# Patient Record
Sex: FEMALE | Race: WHITE | NOT HISPANIC OR LATINO | Employment: FULL TIME
[De-identification: names, ages, dates, MRNs, and addresses within clinical notes are randomized per-mention and may not be internally consistent; named-entity substitution may affect disease eponyms.]

---

## 2017-07-08 ENCOUNTER — HEALTH MAINTENANCE LETTER (OUTPATIENT)
Age: 52
End: 2017-07-08

## 2017-08-17 ENCOUNTER — TELEPHONE (OUTPATIENT)
Dept: INTERNAL MEDICINE | Facility: CLINIC | Age: 52
End: 2017-08-17

## 2017-08-17 NOTE — TELEPHONE ENCOUNTER
Pt calls with question. She has an appt for a physical with Dr. Morse in Sept, but thinks she may need to be seen by a specialist for some GI symptoms she is having. Pt reports loose, sticky stools, indigestion, belching and burning in throat. No blood in stools. Pt is taking TUMs and Pepto for GI symptoms with some relief. Advised appt in our office first for evaluation as Dr. Morse may be able to treat this, pt should be seen sooner than scheduled on 9/12. Pt scheduled appt for 8/28/17 with Dr. Morse and advised to call if symptoms worsen.     Recommended pt try OTC Pepcid or Zantac as some of her symptoms could be from acid reflux. Also advised modifying diet and following BRAT diet for loose stools and avoid spicy, fatty, acidic or fried foods. Pt verbalizes understanding.

## 2017-08-29 ENCOUNTER — OFFICE VISIT (OUTPATIENT)
Dept: INTERNAL MEDICINE | Facility: CLINIC | Age: 52
End: 2017-08-29
Payer: COMMERCIAL

## 2017-08-29 VITALS
HEART RATE: 84 BPM | WEIGHT: 138 LBS | HEIGHT: 67 IN | SYSTOLIC BLOOD PRESSURE: 110 MMHG | OXYGEN SATURATION: 99 % | DIASTOLIC BLOOD PRESSURE: 70 MMHG | TEMPERATURE: 98.6 F | BODY MASS INDEX: 21.66 KG/M2

## 2017-08-29 DIAGNOSIS — Z80.0 FAMILY HISTORY OF ESOPHAGEAL CANCER: ICD-10-CM

## 2017-08-29 DIAGNOSIS — K58.9 IRRITABLE BOWEL SYNDROME, UNSPECIFIED TYPE: ICD-10-CM

## 2017-08-29 DIAGNOSIS — Z00.00 ENCOUNTER FOR ROUTINE ADULT HEALTH EXAMINATION WITHOUT ABNORMAL FINDINGS: Primary | ICD-10-CM

## 2017-08-29 DIAGNOSIS — K21.9 GASTROESOPHAGEAL REFLUX DISEASE WITHOUT ESOPHAGITIS: ICD-10-CM

## 2017-08-29 LAB
BASOPHILS # BLD AUTO: 0.1 10E9/L (ref 0–0.2)
BASOPHILS NFR BLD AUTO: 1.2 %
DIFFERENTIAL METHOD BLD: ABNORMAL
EOSINOPHIL # BLD AUTO: 0.3 10E9/L (ref 0–0.7)
EOSINOPHIL NFR BLD AUTO: 4.8 %
ERYTHROCYTE [DISTWIDTH] IN BLOOD BY AUTOMATED COUNT: 20.1 % (ref 10–15)
HCT VFR BLD AUTO: 29.2 % (ref 35–47)
HGB BLD-MCNC: 9.9 G/DL (ref 11.7–15.7)
LYMPHOCYTES # BLD AUTO: 1.8 10E9/L (ref 0.8–5.3)
LYMPHOCYTES NFR BLD AUTO: 27.6 %
MCH RBC QN AUTO: 30.8 PG (ref 26.5–33)
MCHC RBC AUTO-ENTMCNC: 33.9 G/DL (ref 31.5–36.5)
MCV RBC AUTO: 91 FL (ref 78–100)
MONOCYTES # BLD AUTO: 0.4 10E9/L (ref 0–1.3)
MONOCYTES NFR BLD AUTO: 6.4 %
NEUTROPHILS # BLD AUTO: 4 10E9/L (ref 1.6–8.3)
NEUTROPHILS NFR BLD AUTO: 60 %
PLATELET # BLD AUTO: 224 10E9/L (ref 150–450)
RBC # BLD AUTO: 3.21 10E12/L (ref 3.8–5.2)
WBC # BLD AUTO: 6.6 10E9/L (ref 4–11)

## 2017-08-29 PROCEDURE — 36415 COLL VENOUS BLD VENIPUNCTURE: CPT | Performed by: INTERNAL MEDICINE

## 2017-08-29 PROCEDURE — 99396 PREV VISIT EST AGE 40-64: CPT | Performed by: INTERNAL MEDICINE

## 2017-08-29 PROCEDURE — 82306 VITAMIN D 25 HYDROXY: CPT | Performed by: INTERNAL MEDICINE

## 2017-08-29 PROCEDURE — 80061 LIPID PANEL: CPT | Performed by: INTERNAL MEDICINE

## 2017-08-29 PROCEDURE — 80050 GENERAL HEALTH PANEL: CPT | Performed by: INTERNAL MEDICINE

## 2017-08-29 PROCEDURE — 84439 ASSAY OF FREE THYROXINE: CPT | Performed by: INTERNAL MEDICINE

## 2017-08-29 ASSESSMENT — PATIENT HEALTH QUESTIONNAIRE - PHQ9
5. POOR APPETITE OR OVEREATING: MORE THAN HALF THE DAYS
SUM OF ALL RESPONSES TO PHQ QUESTIONS 1-9: 14

## 2017-08-29 ASSESSMENT — ANXIETY QUESTIONNAIRES
3. WORRYING TOO MUCH ABOUT DIFFERENT THINGS: NEARLY EVERY DAY
1. FEELING NERVOUS, ANXIOUS, OR ON EDGE: MORE THAN HALF THE DAYS
2. NOT BEING ABLE TO STOP OR CONTROL WORRYING: NEARLY EVERY DAY
GAD7 TOTAL SCORE: 13
7. FEELING AFRAID AS IF SOMETHING AWFUL MIGHT HAPPEN: NOT AT ALL
6. BECOMING EASILY ANNOYED OR IRRITABLE: SEVERAL DAYS
5. BEING SO RESTLESS THAT IT IS HARD TO SIT STILL: MORE THAN HALF THE DAYS
IF YOU CHECKED OFF ANY PROBLEMS ON THIS QUESTIONNAIRE, HOW DIFFICULT HAVE THESE PROBLEMS MADE IT FOR YOU TO DO YOUR WORK, TAKE CARE OF THINGS AT HOME, OR GET ALONG WITH OTHER PEOPLE: VERY DIFFICULT

## 2017-08-29 NOTE — NURSING NOTE
"Chief Complaint   Patient presents with     Physical     digestive issues getting worse       Initial /70 (BP Location: Left arm, Cuff Size: Adult Regular)  Pulse 84  Temp 98.6  F (37  C) (Oral)  Ht 5' 7\" (1.702 m)  Wt 138 lb (62.6 kg)  LMP 04/28/2009  SpO2 99%  Breastfeeding? No  BMI 21.61 kg/m2 Estimated body mass index is 21.61 kg/(m^2) as calculated from the following:    Height as of this encounter: 5' 7\" (1.702 m).    Weight as of this encounter: 138 lb (62.6 kg).  Medication Reconciliation: complete    Yolande Posadas CMA    "

## 2017-08-29 NOTE — PROGRESS NOTES
SUBJECTIVE:   CC: Kari Christian is an 51 year old woman who presents for preventive health visit.     Physical   Annual:     Getting at least 3 servings of Calcium per day::  NO    Bi-annual eye exam::  Yes    Dental care twice a year::  Yes    Sleep apnea or symptoms of sleep apnea::  None    Diet::  Regular (no restrictions) (lactose intolerant)    Frequency of exercise:: walks.    Duration of exercise::  15-30 minutes    Taking medications regularly::  Yes    Medication side effects::  None    Additional concerns today:: GI.    She has had numbness/tingling intermittently.  Her IBS is flared up recently. She has had diarrhea.         Today's PHQ-2 Score: PHQ-2 ( 1999 Pfizer) 1/19/2015   Q1: Little interest or pleasure in doing things 0   Q2: Feeling down, depressed or hopeless 0   PHQ-2 Score 0       Abuse: Current or Past(Physical, Sexual or Emotional)- No  Do you feel safe in your environment - Yes    Social History   Substance Use Topics     Smoking status: Current Every Day Smoker     Packs/day: 0.30     Years: 20.00     Types: Cigarettes     Smokeless tobacco: Never Used     Alcohol use 1.2 - 1.8 oz/week     2 - 3 Standard drinks or equivalent per week      Comment: socially     The patient does not drink >3 drinks per day nor >7 drinks per week.    Reviewed orders with patient.  Reviewed health maintenance and updated orders accordingly - Yes  Labs reviewed in [facility]    Patient over age 50, mutual decision to screen reflected in health maintenance.      Pertinent mammograms are reviewed under the imaging tab.  History of abnormal Pap smear: NO - age 30- 65 PAP every 3 years recommended    [facility]         Reviewed and updated as needed this visit by Provider              ROS:  C: NEGATIVE for fever, chills, change in weight  I: NEGATIVE for worrisome rashes, moles or lesions  E: NEGATIVE for vision changes or irritation  ENT: NEGATIVE for  "ear, mouth and throat problems  R: NEGATIVE for significant cough or SOB  B: NEGATIVE for masses, tenderness or discharge  CV: NEGATIVE for chest pain, palpitations or peripheral edema  GI: NEGATIVE for nausea, abdominal pain, heartburn, or change in bowel habits  : NEGATIVE for unusual urinary or vaginal symptoms. No vaginal bleeding.  M: NEGATIVE for significant arthralgias or myalgia  N: NEGATIVE for weakness, dizziness or paresthesias  P: NEGATIVE for changes in mood or affect      OBJECTIVE:   LMP 04/28/2009   /70 (BP Location: Left arm, Cuff Size: Adult Regular)  Pulse 84  Temp 98.6  F (37  C) (Oral)  Ht 5' 7\" (1.702 m)  Wt 138 lb (62.6 kg)  LMP 04/28/2009  SpO2 99%  Breastfeeding? No  BMI 21.61 kg/m2    EXAM:  GENERAL: healthy, alert and no distress  EYES: Eyes grossly normal to inspection, PERRL and conjunctivae and sclerae normal  HENT: ear canals and TM's normal, nose and mouth without ulcers or lesions  NECK: no adenopathy, no asymmetry, masses, or scars and thyroid normal to palpation  RESP: lungs clear to auscultation - no rales, rhonchi or wheezes  BREAST: normal without masses, tenderness or nipple discharge and no palpable axillary masses or adenopathy  CV: regular rate and rhythm, normal S1 S2, no S3 or S4, no murmur, click or rub, no peripheral edema and peripheral pulses strong  ABDOMEN: soft, nontender, no hepatosplenomegaly, no masses and bowel sounds normal  MS: no gross musculoskeletal defects noted, no edema  SKIN: no suspicious lesions or rashes  NEURO: Normal strength and tone, mentation intact and speech normal  PSYCH: mentation appears normal, affect normal/bright    ASSESSMENT/PLAN:     (Z00.00) Encounter for routine adult health examination without abnormal findings  (primary encounter diagnosis)  Comment: fasting  Plan: Vitamin D Deficiency, CBC with platelets         differential, Comprehensive metabolic panel,         TSH with free T4 reflex, Lipid panel reflex to     " "    direct LDL              COUNSELING:  Reviewed preventive health counseling, as reflected in patient instructions         reports that she has been smoking Cigarettes.  She has a 6.00 pack-year smoking history. She has never used smokeless tobacco.    Estimated body mass index is 21.61 kg/(m^2) as calculated from the following:    Height as of 12/23/16: 5' 7\" (1.702 m).    Weight as of 12/23/16: 138 lb (62.6 kg).         Counseling Resources:  ATP IV Guidelines  Pooled Cohorts Equation Calculator  Breast Cancer Risk Calculator  FRAX Risk Assessment  ICSI Preventive Guidelines  Dietary Guidelines for Americans, 2010  USDA's MyPlate  ASA Prophylaxis  Lung CA Screening    Brenna Morse MD  [facility]  "

## 2017-08-30 LAB
ALBUMIN SERPL-MCNC: 4.1 G/DL (ref 3.4–5)
ALP SERPL-CCNC: 60 U/L (ref 40–150)
ALT SERPL W P-5'-P-CCNC: 17 U/L (ref 0–50)
ANION GAP SERPL CALCULATED.3IONS-SCNC: 7 MMOL/L (ref 3–14)
AST SERPL W P-5'-P-CCNC: 17 U/L (ref 0–45)
BILIRUB SERPL-MCNC: 3.4 MG/DL (ref 0.2–1.3)
BUN SERPL-MCNC: 11 MG/DL (ref 7–30)
CALCIUM SERPL-MCNC: 8.3 MG/DL (ref 8.5–10.1)
CHLORIDE SERPL-SCNC: 106 MMOL/L (ref 94–109)
CHOLEST SERPL-MCNC: 130 MG/DL
CO2 SERPL-SCNC: 28 MMOL/L (ref 20–32)
CREAT SERPL-MCNC: 0.66 MG/DL (ref 0.52–1.04)
DEPRECATED CALCIDIOL+CALCIFEROL SERPL-MC: 31 UG/L (ref 20–75)
GFR SERPL CREATININE-BSD FRML MDRD: >90 ML/MIN/1.7M2
GLUCOSE SERPL-MCNC: 70 MG/DL (ref 70–99)
HDLC SERPL-MCNC: 68 MG/DL
LDLC SERPL CALC-MCNC: 48 MG/DL
NONHDLC SERPL-MCNC: 62 MG/DL
POTASSIUM SERPL-SCNC: 3.5 MMOL/L (ref 3.4–5.3)
PROT SERPL-MCNC: 7.3 G/DL (ref 6.8–8.8)
SODIUM SERPL-SCNC: 141 MMOL/L (ref 133–144)
T4 FREE SERPL-MCNC: 1.25 NG/DL (ref 0.76–1.46)
TRIGL SERPL-MCNC: 72 MG/DL
TSH SERPL DL<=0.005 MIU/L-ACNC: 0.33 MU/L (ref 0.4–4)

## 2017-08-30 ASSESSMENT — ASTHMA QUESTIONNAIRES: ACT_TOTALSCORE: 19

## 2017-08-30 ASSESSMENT — ANXIETY QUESTIONNAIRES: GAD7 TOTAL SCORE: 13

## 2017-09-19 ENCOUNTER — TELEPHONE (OUTPATIENT)
Dept: INTERNAL MEDICINE | Facility: CLINIC | Age: 52
End: 2017-09-19

## 2017-09-19 NOTE — TELEPHONE ENCOUNTER
Pt calls for lab results, and she was given results.  She says she wants to have a general cancer blood test, but she would like to have it billed as routine.  She has several symptoms that have prompted her to ask for this, as well as family hx on both sides.  She was advised that the Dr can't bill it as routine if it isn't.     Please advise.

## 2017-09-26 NOTE — TELEPHONE ENCOUNTER
We do not have a general cancer test.    A CBC with blood counts were drawn with the physical.    Is there a specific test she is requesting?

## 2017-12-02 ENCOUNTER — HEALTH MAINTENANCE LETTER (OUTPATIENT)
Age: 52
End: 2017-12-02

## 2018-01-03 RX ORDER — INFLUENZA A VIRUS A/GUANGDONG-MAONAN/SWL1536/2019 CNIC-1909 (H1N1) ANTIGEN (FORMALDEHYDE INACTIVATED), INFLUENZA A VIRUS A/HONG KONG/2671/2019 (H3N2) ANTIGEN (FORMALDEHYDE INACTIVATED), INFLUENZA B VIRUS B/PHUKET/3073/2013 ANTIGEN (FORMALDEHYDE INACTIVATED), AND INFLUENZA B VIRUS B/WASHINGTON/02/2019 ANTIGEN (FORMALDEHYDE INACTIVATED) 15; 15; 15; 15 UG/.5ML; UG/.5ML; UG/.5ML; UG/.5ML
INJECTION, SUSPENSION INTRAMUSCULAR
Refills: 0 | COMMUNITY
Start: 2017-10-27 | End: 2018-06-29

## 2018-01-15 ENCOUNTER — HOSPITAL ENCOUNTER (OUTPATIENT)
Facility: CLINIC | Age: 53
Discharge: HOME | End: 2018-01-15
Attending: INTERNAL MEDICINE | Admitting: INTERNAL MEDICINE
Payer: COMMERCIAL

## 2018-01-15 VITALS
RESPIRATION RATE: 14 BRPM | BODY MASS INDEX: 21.97 KG/M2 | OXYGEN SATURATION: 100 % | HEIGHT: 67 IN | SYSTOLIC BLOOD PRESSURE: 92 MMHG | WEIGHT: 140 LBS | DIASTOLIC BLOOD PRESSURE: 60 MMHG | HEART RATE: 77 BPM

## 2018-01-15 LAB
COLONOSCOPY: NORMAL
UPPER GI ENDOSCOPY: NORMAL

## 2018-01-15 PROCEDURE — 88305 TISSUE EXAM BY PATHOLOGIST: CPT | Mod: 26 | Performed by: INTERNAL MEDICINE

## 2018-01-15 PROCEDURE — 25000125 ZZHC RX 250: Performed by: INTERNAL MEDICINE

## 2018-01-15 PROCEDURE — 25000128 H RX IP 250 OP 636: Performed by: INTERNAL MEDICINE

## 2018-01-15 PROCEDURE — 43239 EGD BIOPSY SINGLE/MULTIPLE: CPT | Performed by: INTERNAL MEDICINE

## 2018-01-15 PROCEDURE — G0500 MOD SEDAT ENDO SERVICE >5YRS: HCPCS | Performed by: INTERNAL MEDICINE

## 2018-01-15 PROCEDURE — 99153 MOD SED SAME PHYS/QHP EA: CPT | Performed by: INTERNAL MEDICINE

## 2018-01-15 PROCEDURE — 88305 TISSUE EXAM BY PATHOLOGIST: CPT | Performed by: INTERNAL MEDICINE

## 2018-01-15 PROCEDURE — 45385 COLONOSCOPY W/LESION REMOVAL: CPT | Performed by: INTERNAL MEDICINE

## 2018-01-15 RX ORDER — ONDANSETRON 4 MG/1
4 TABLET, ORALLY DISINTEGRATING ORAL EVERY 6 HOURS PRN
Status: DISCONTINUED | OUTPATIENT
Start: 2018-01-15 | End: 2018-01-15 | Stop reason: HOSPADM

## 2018-01-15 RX ORDER — ONDANSETRON 2 MG/ML
INJECTION INTRAMUSCULAR; INTRAVENOUS PRN
Status: DISCONTINUED | OUTPATIENT
Start: 2018-01-15 | End: 2018-01-15 | Stop reason: HOSPADM

## 2018-01-15 RX ORDER — FLUMAZENIL 0.1 MG/ML
0.2 INJECTION, SOLUTION INTRAVENOUS
Status: DISCONTINUED | OUTPATIENT
Start: 2018-01-15 | End: 2018-01-15 | Stop reason: HOSPADM

## 2018-01-15 RX ORDER — ONDANSETRON 2 MG/ML
4 INJECTION INTRAMUSCULAR; INTRAVENOUS
Status: DISCONTINUED | OUTPATIENT
Start: 2018-01-15 | End: 2018-01-15 | Stop reason: HOSPADM

## 2018-01-15 RX ORDER — NALOXONE HYDROCHLORIDE 0.4 MG/ML
.1-.4 INJECTION, SOLUTION INTRAMUSCULAR; INTRAVENOUS; SUBCUTANEOUS
Status: DISCONTINUED | OUTPATIENT
Start: 2018-01-15 | End: 2018-01-15 | Stop reason: HOSPADM

## 2018-01-15 RX ORDER — ONDANSETRON 2 MG/ML
4 INJECTION INTRAMUSCULAR; INTRAVENOUS EVERY 6 HOURS PRN
Status: DISCONTINUED | OUTPATIENT
Start: 2018-01-15 | End: 2018-01-15 | Stop reason: HOSPADM

## 2018-01-15 RX ORDER — FENTANYL CITRATE 50 UG/ML
INJECTION, SOLUTION INTRAMUSCULAR; INTRAVENOUS PRN
Status: DISCONTINUED | OUTPATIENT
Start: 2018-01-15 | End: 2018-01-15 | Stop reason: HOSPADM

## 2018-01-15 RX ORDER — LIDOCAINE 40 MG/G
CREAM TOPICAL
Status: DISCONTINUED | OUTPATIENT
Start: 2018-01-15 | End: 2018-01-15 | Stop reason: HOSPADM

## 2018-01-15 NOTE — DISCHARGE INSTRUCTIONS
Understanding Colon and Rectal Polyps     The colon has a smooth lining composed of millions of cells.     The colon (also called the large intestine) is a muscular tube that forms the last part of the digestive tract. It absorbs water and stores food waste. The colon is about 4 to 6 feet long. The rectum is the last 6 inches of the colon. The colon and rectum have a smooth lining composed of millions of cells. Changes in these cells can lead to growths in the colon that can become cancerous and should be removed.     When the Colon Lining Changes  Changes that occur in the cells that line the colon or rectum can lead to growths called polyps. Over a period of years, polyps can turn cancerous. Removing polyps early may prevent cancer from ever forming.      Polyps  Polyps are fleshy clumps of tissue that form on the lining of the colon or rectum. Small polyps are usually benign (not cancerous). However, over time, cells in a polyp can change and become cancerous. The larger a polyp grows, the more likely this is to happen. Also, certain types of polyps known as adenomatous polyps are considered premalignant. This means that they will almost always become cancerous if they re not removed.          Cancer  Almost all colorectal cancers start when polyp cells begin growing abnormally. As a cancerous tumor grows, it may involve more and more of the colon or rectum. In time, cancer can also grow beyond the colon or rectum and spread to nearby organs or to glands called lymph nodes. The cells can also travel to other parts of the body. This is known as metastasis. The earlier a cancerous tumor is removed, the better the chance of preventing its spread.        [id number] [facility], [address] [place] [address], [place] [address]. All rights reserved. This information is not intended as a substitute for professional medical care. Always follow your healthcare professional's instructions.

## 2018-01-15 NOTE — LETTER
January 3, 2018  Kari Christian  [address] [place] [address]        Thank you for choosing [facility]. You are scheduled for the following services.     Date:  Monday, January 15       Procedure: UPPER ENDOSCOPY & COLONOSCOPY  Doctor:  Dr. Chad Wade          Arrival Time:   12:30pm *check in at Emergency/Endoscopy desk*  Procedure Time:  1pm  Location:   [facility]        Endoscopy Department, First Floor (Enter through ER Doors) *         [address] [place], [place] [address]      [phone] or [phone] ([facility]) to reschedule      MIRALAX -GATORADE  PREP    Upper Endoscopy or Esophagogastroduodenoscopy (EGD) is a test performed to evaluate symptoms of persistent abdominal pain, nausea, vomiting or difficulty swallowing. It may also be used to treat various conditions of the upper gastrointestinal (GI) tract, such as bleeding, narrowing or abnormal growths. During the procedure, a doctor examines the lining of your esophagus, stomach and the first part of your small intestine through a thin, flexible tube called an endoscope. If growths or other abnormalities are found during the procedure, the doctor may remove the abnormal tissue (biopsy) for further examination.     Colonoscopy is the most accurate test to detect colon polyps and colon cancer; and the only test where polyps can be removed. During this procedure, a doctor examines the lining of your large intestine and rectum through a flexible tube.     Transportation  Arrange for a ride for the day of your procedures with a responsible adult.  A taxi ride is not an option unless you are accompanied by a responsible adult. If you fail to arrange transportation with a responsible adult, your procedure will be cancelled and rescheduled.    Purchase the  following supplies at your local pharmacy:  - 2 (two) bisacodyl tablets: each tablet contains 5 mg.  (Dulcolax  laxative NOT  Dulcolax  stool softener)   - 1 (one) 8.3 oz bottle of Polyethylene Glycol (PEG) 3350 Powder   (MiraLAX , Smooth LAX , ClearLAX  or equivalent)  - 64 oz Gatorade    Regular Gatorade, Gatorade G2 , Powerade , Powerade Zero  or Pedialyte  is acceptable. Red colored flavors are not allowed; all other colors (yellow, green, orange, purple and blue) are okay. It is also okay to buy two 2.12 oz packets of powdered Gatorade that can be mixed with water to a total volume of 64 oz of liquid.  - 1 (one) 10 oz bottle of Magnesium Citrate (Red colored flavors are not allowed)  It is also okay for you to use a 0.5 oz package of powdered magnesium citrate (17 g) mixed with 10 oz of water.      PREPARATION FOR COLONOSCOPY  7 days before:    Discontinue fiber supplements and medications containing iron. This includes Metamucil  and Fibercon ; and multivitamins with iron.  3 days before:    Begin a low-fiber diet. A low-fiber diet helps making the cleanout more effective.     Examples of a low-fiber diet include (but are not limited to): white bread, white rice, pasta, crackers, fish, chicken, eggs, ground beef, creamy peanut butter, cooked/steamed/boiled vegetables, canned fruit, bananas, melons, milk, plain yogurt cheese, salad dressing and other condiments.     The following are not allowed on a low-fiber diet: seeds, nuts, popcorn, bran, whole wheat, corn, quinoa, raw fruits and vegetables, berries and dried fruit, beans and lentils.    For additional details on low-fiber diet, please refer to the table on the last page.  2 days before:    Continue the low-fiber diet.     Drink at least 8 glasses of water throughout the day.     Stop eating solid foods at 11:45 pm.  1 day before:    In the morning: begin a clear liquid diet (liquids you can see through).     Examples of a clear liquid diet include: water, clear broth or bouillon, Gatorade, Pedialyte or Powerade, carbonated and non-carbonated soft drinks (Sprite , 7-Up , steve  vinicio), strained fruit juices without pulp (apple, white grape, white cranberry), Jell-O  and popsicles.     The following are not allowed on a clear liquid diet: red liquids, alcoholic beverages, coffee, dairy products (milk, creamer, and yogurt), protein shakes, creamy broths, juice with pulp and chewing tobacco.    At noon: take 2 (two) bisacodyl tablets     At 4 (and no later than 6pm): start drinking the Miralax-Gatorade preparation (8.3 oz of Miralax mixed with 64 oz of Gatorade in a large pitcher). Drink 1(one) 8 oz glass every 15 minutes thereafter, until the mixture is gone.      COLON CLEANSING TIPS: drink adequate amounts of fluids before and after your colon cleansing to prevent dehydration. Stay near a toilet because you will have diarrhea. Even if you are sitting on the toilet, continue to drink the cleansing solution every 15 minutes. If you feel nauseous or vomit, rinse your mouth with water, take a 15 to 30-minute-break and then continue drinking the solution. You will be uncomfortable until the stool has flushed from your colon (in about 2 to 4 hours). You may feel chilled.    Day of your procedure  You may take all of your morning medications including blood pressure medications, blood thinners (if you have not been instructed to stop these by our office), methadone, anti-seizure medications with sips of water 3 hours prior to your procedure or earlier. Do not take insulin or vitamins prior to your procedure. Continue the clear liquid diet.   4 hours prior: drink 10 oz of magnesium citrate. It may be easier to drink it with a straw.    STOP consuming all liquids after that.     Do not take anything by mouth during this time.     Allow extra time to travel to your procedure as you may need to stop and use a restroom along the way.  You are ready for the procedure, if you followed all instructions and your stool is no longer formed, but clear or yellow liquid. If you are unsure whether your colon is  clean, please call our office at [phone] before you leave for your appointment.  Bring the following to your procedure:  - Insurance Card/Photo ID.   - List of current medications including over-the-counter medications and supplements.   - Your rescue inhaler if you currently use one to control asthma.    Canceling or rescheduling your appointment:   If you must cancel or reschedule your appointment, please call [phone] as soon as possible.    COLONOSCOPY PRE-PROCEDURE CHECKLIST  If you have diabetes, ask your regular doctor for diet and medication restrictions.  If you take an anticoagulant or anti-platelet medication (such as Coumadin , Lovenox , Pradaxa , Xarelto , Eliquis , etc.), please call your primary doctor for advice on holding this medication.  If you take aspirin you may continue to do so.  If you are or may be pregnant, please discuss the risks and benefits of this procedure with your doctor.    What happens during a colonoscopy?  Plan to spend up to two hours, starting at registration time, at the endoscopy center the day of your procedure. The colonoscopy takes an average of 15 to 30 minutes. Recovery time is about 30 minutes.     Before the exam:    You will change into a gown.    Your medical history and medication list will be reviewed with you, unless that has been done over the phone prior to the procedure.     A nurse will insert an intravenous (IV) line into your hand or arm.    The doctor will meet with you and will give you a consent form to sign.    During the exam:     Medicine will be given through the IV line to help you relax.     Your heart rate and oxygen levels will be monitored. If your blood pressure is low, you may be given fluids through the IV line.     The doctor will insert a flexible hollow tube, called a colonoscope, into your rectum. The scope will be advanced slowly through the large intestine (colon).    You may have a feeling of fullness or pressure.     If an  abnormal tissue or a polyp is found, the doctor may remove it through the endoscope for closer examination, or biopsy. Tissue removal is painless    After the exam:           Any tissue samples removed during the exam will be sent to a lab for evaluation. It may take 5-7 working days for you to be notified of the results.     A nurse will provide you with complete discharge instructions before you leave the endoscopy center. Be sure to ask the nurse for specific instructions if you take blood thinners such as Aspirin, Coumadin or Plavix.     The doctor will prepare a full report for you and for the physician who referred you for the procedure.     Your doctor will talk with you about the initial results of your exam.      Medication given during the exam will prohibit you from driving for the rest of the day.     Following the exam, you may resume your normal diet. Your first meal should be light, no greasy foods. Avoid alcohol until the next day.     You may resume your regular activities the day after the procedure.       LOW-FIBER DIET  Foods RECOMMENDED Foods to AVOID   Breads, Cereal, Rice and Pasta:   White bread, rolls, biscuits, croissant and leanne toast.   Waffles, Burkinan toast and pancakes.   White rice, noodles, pasta, macaroni and peeled cooked potatoes.   Plain crackers and saltines.   Cooked cereals: farina, cream of rice.   Cold cereals: Puffed Rice , Rice Krispies , Corn Flakes  and Special K    Breads, Cereal, Rice and Pasta:   Breads or rolls with nuts, seeds or fruit.   Whole wheat, pumpernickel, rye breads and cornbread.   Potatoes with skin, brown or wild rice, and kasha (buckwheat).     Vegetables:   Tender cooked and canned vegetables without seeds: carrots, asparagus tips, green or wax beans, pumpkin, spinach, lima beans. Vegetables:   Raw or steamed vegetables (w/ or without seeds)   Johny.   Winter squash, peas, broccoli, Brussel sprouts, cabbage, onions, cauliflower, baked beans, peas  and corn.   Fruits:   Strained fruit juice.   Canned fruit, except pineapple.   Ripe bananas and melon. Fruits:   Prunes and prune juice.   Raw fruits.   Dried fruits: figs, dates and raisins.   Milk/Dairy:   Milk: plain or flavored; yogurt; ice cream.   Custard; cheese and cottage cheese Milk/Dairy:     Meat and other proteins:   Ground, well-cooked tender beef, lamb, ham, veal, pork, fish, poultry, organ meats and eggs.   Peanut butter without nuts. Meat and other proteins:   Tough, fibrous meats with gristle.   Dry beans and peas; lentils and Tofu   Peanut butter with nuts.   Fats, Snack, Sweets, Condiments and Beverages:   Margarine, butter, oils, mayonnaise, sour cream and salad dressing, plain gravy.   Sugar, hard candy, clear jelly, honey and syrup.   Spices, cooked herbs, bouillon, broth and soups made with allowed vegetable, ketchup and mustard.   Coffee, tea and carbonated drinks.   Plain cakes, cookies and pretzels.   Gelatin, plain puddings, custard, ice cream, sherbet and popsicles. Fats, Snack, Sweets, Condiments and Beverages:   Nuts, seeds and coconut.   Jam, marmalade and preserves.   Pickles, olives, relish and horseradish.   All desserts containing nuts, seeds, dried fruit and coconut; or made from whole grains or bran.   Candy made with nuts or seeds.   Popcorn.             DIRECTIONS TO THE ENDOSCOPY DEPARTMENT  From the north ([facility])  Take [address] south, exit on [address] [address]. Get into the left hand luca, turn left (east), go one-half mile to [address] and turn left. Go north to the first stoplight, take a right on [address] and follow it to the Emergency entrance.    From the south ([facility])  Take 35N to the [address]E split and exit on [address] [address]. On [address] [address], turn left (west) to [address]. Turn right (north) on [address]. Go north to the first stoplight, take a right on [address] and follow it to the Emergency  entrance.    From the east via 35E ([facility])  Take [address] south to [address]  exit. Turn right on [address]. Go west to [address]. Turn right (north) on [address]. Go to the first stoplight, take a right and follow on [address] to the Emergency entrance.    From the east via [address] ([facility])  Take [address] to [address]. Turn left (south) on [address] to [address]. Turn left (east) on [address] and follow it to the Emergency entrance.    From the west via [address] ([place])  Take [address] east to [address]. Turn right (south) on [address] to [address]. Turn left (east) on [address] and follow it to the Emergency entrance.

## 2018-01-15 NOTE — IP AVS SNAPSHOT
MRN:[id number]                      After Visit Summary   1/15/2018    Kari Christian    MRN: [id number]           Thank you!     Thank you for choosing [facility] for your care. Our goal is always to provide you with excellent care. Hearing back from our patients is one way we can continue to improve our services. Please take a few minutes to complete the written survey that you may receive in the mail after you visit. If you would like to speak to someone directly about your visit please contact Patient Relations at [phone]. Thank you!          Patient Information     Date Of Birth          1965        About your hospital stay     You were admitted on:  January 15, 2018 You last received care in the:  [facility]    You were discharged on:  January 15, 2018       Who to Call     For medical emergencies, please call 911.  For non-urgent questions about your medical care, please call your primary care provider or clinic, [phone]  For questions related to your surgery, please call your surgery clinic        Attending Provider     Provider Specialty    Chad Wade MD Gastroenterology       Primary Care Provider Office Phone # Fax #    Brenna Gulshan Morse [phone]      Further instructions from your care team         Understanding Colon and Rectal Polyps     The colon has a smooth lining composed of millions of cells.     The colon (also called the large intestine) is a muscular tube that forms the last part of the digestive tract. It absorbs water and stores food waste. The colon is about 4 to 6 feet long. The rectum is the last 6 inches of the colon. The colon and rectum have a smooth lining composed of millions of cells. Changes in these cells can lead to growths in the colon that can become cancerous and should be removed.     When the Colon Lining Changes  Changes that occur in the cells that line the colon or rectum can lead to  "growths called polyps. Over a period of years, polyps can turn cancerous. Removing polyps early may prevent cancer from ever forming.      Polyps  Polyps are fleshy clumps of tissue that form on the lining of the colon or rectum. Small polyps are usually benign (not cancerous). However, over time, cells in a polyp can change and become cancerous. The larger a polyp grows, the more likely this is to happen. Also, certain types of polyps known as adenomatous polyps are considered premalignant. This means that they will almost always become cancerous if they re not removed.          Cancer  Almost all colorectal cancers start when polyp cells begin growing abnormally. As a cancerous tumor grows, it may involve more and more of the colon or rectum. In time, cancer can also grow beyond the colon or rectum and spread to nearby organs or to glands called lymph nodes. The cells can also travel to other parts of the body. This is known as metastasis. The earlier a cancerous tumor is removed, the better the chance of preventing its spread.        4957-3536 [address], [place] [address]. All rights reserved. This information is not intended as a substitute for professional medical care. Always follow your healthcare professional's instructions.    Pending Results     No orders found from 1/13/2018 to 1/16/2018.            Admission Information     Date & Time Provider Department Dept. Phone    1/15/2018 Chad Wade MD [facility] [phone]      Your Vitals Were     Blood Pressure Pulse Respirations Height Weight Last Period    88/60 77 12 1.702 m (5' 7\") 63.5 kg (140 lb) 04/28/2009    Pulse Oximetry BMI (Body Mass Index)                97% 21.93 kg/m2          [employer] Information     [employer] gives you secure access to your electronic health record. If you see a primary care provider, you can also send messages to your care team and make appointments. If you have questions, " please call your primary care clinic.  If you do not have a primary care provider, please call [phone] and they will assist you.        Care EveryWhere ID     This is your Care EveryWhere ID. This could be used by other organizations to access your [facility] medical records  [id number]        Equal Access to Services     JACOBYANTONINO OSEI : Hadii aad ku hadnuryso Sokarloali, waaxda luqadaha, qaybta kaalmada gennaro, jennifer maindaxjulius ramos. So [facility] [phone].    ATENCIÓN: Si habla español, tiene a barajas disposición servicios gratuitos de asistencia lingüística. Llame al [phone].    We comply with applicable federal civil rights laws and [place] laws. We do not discriminate on the basis of race, color, national origin, age, disability, sex, sexual orientation, or gender identity.               Review of your medicines      CONTINUE these medicines which have NOT CHANGED        Dose / Directions    ADVIL PO        Refills:  0       albuterol 108 (90 BASE) MCG/ACT Inhaler   Commonly known as:  PROAIR HFA/PROVENTIL HFA/VENTOLIN HFA   Used for:  SOB (shortness of breath)        Dose:  2 puff   Inhale 2 puffs into the lungs every 6 hours as needed for shortness of breath / dyspnea   Quantity:  3 Inhaler   Refills:  1       FLUZONE QUADRIVALENT 0.5 ML injection   Generic drug:  influenza quadrivalent (PF) vacc age 3 yrs and older        INFLUENZA VACCINE 1420-3005, ADMINISTERED IM ARM______LOT______ROUTE IM   Refills:  0       FOLIC ACID PO        Dose:  1 mg   Take 1 mg by mouth daily   Refills:  0       triamcinolone 0.1 % cream   Commonly known as:  KENALOG   Used for:  Rash        Apply sparingly to affected area 2-3 times daily for up to 14 days at a time.   Quantity:  30 g   Refills:  0                Protect others around you: Learn how to safely use, store and throw away your medicines at www.disposemymeds.org.             Medication List: This is a list of all your medications and when to  take them. Check marks below indicate your daily home schedule. Keep this list as a reference.      Medications           Morning Afternoon Evening Bedtime As Needed    ADVIL PO                                albuterol 108 (90 BASE) MCG/ACT Inhaler   Commonly known as:  PROAIR HFA/PROVENTIL HFA/VENTOLIN HFA   Inhale 2 puffs into the lungs every 6 hours as needed for shortness of breath / dyspnea                                FLUZONE QUADRIVALENT 0.5 ML injection   INFLUENZA VACCINE [id number], ADMINISTERED IM ARM______LOT______ROUTE IM   Generic drug:  influenza quadrivalent (PF) vacc age 3 yrs and older                                FOLIC ACID PO   Take 1 mg by mouth daily                                triamcinolone 0.1 % cream   Commonly known as:  KENALOG   Apply sparingly to affected area 2-3 times daily for up to 14 days at a time.

## 2018-01-15 NOTE — H&P
Pre-Endoscopy History and Physical     Kari Christian MRN# [id number]   YOB: 1965 Age: 52 year old     Date of Procedure: 1/15/2018  Primary care provider: Brenna Morse  Type of Endoscopy: Colonoscopy with possible biopsy, possible polypectomy and Gastroscopy with possible biopsy, possible dilation  Reason for Procedure: screen  Type of Anesthesia Anticipated: Conscious Sedation    HPI:    Kari is a 52 year old female who will be undergoing the above procedure.      A history and physical has been performed. The patient's medications and allergies have been reviewed. The risks and benefits of the procedure and the sedation options and risks were discussed with the patient.  All questions were answered and informed consent was obtained.      She denies a personal or family history of anesthesia complications or bleeding disorders.     Patient Active Problem List   Diagnosis     Hereditary spherocytosis (H)     Major depressive disorder, single episode, mild (H)     CARDIOVASCULAR SCREENING; LDL GOAL LESS THAN 160     Anxiety     Vitamin D deficiency     Hyperthyroidism     Tobacco abuse     Intermittent asthma        Past Medical History:   Diagnosis Date     Anemia, unspecified     normal is 7-10     Hereditary spherocytosis (H)     no past transfusion;      Major depressive disorder, single episode, mild (H)      Thyroid disease         Past Surgical History:   Procedure Laterality Date     CHOLECYSTECTOMY  2007       Social History   Substance Use Topics     Smoking status: Current Every Day Smoker     Packs/day: 0.30     Years: 20.00     Types: Cigarettes     Smokeless tobacco: Never Used     Alcohol use 1.2 - 1.8 oz/week     2 - 3 Standard drinks or equivalent per week      Comment: socially       Family History   Problem Relation Age of Onset     Hypertension Mother      Arthritis Mother      Respiratory Mother      Emphysema     GASTROINTESTINAL DISEASE Mother      Circulatory Mother       "C.A.D. Father 59     Hypertension Father      CANCER Father      Esophageal     GASTROINTESTINAL DISEASE Father      Gallstones, Gallbladder removal, Colostomy, diverticulitis     CANCER Maternal Grandfather      Kidney     CANCER Paternal Grandfather      Lung, smoker     Breast Cancer Sister      Thyroid Disease Sister      Cancer - colorectal No family hx of        Prior to Admission medications    Medication Sig Start Date End Date Taking? Authorizing Provider   FLUZONE QUADRIVALENT 0.5 ML injection INFLUENZA VACCINE [id number], ADMINISTERED IM ARM______LOT______ROUTE IM 10/27/17   Reported, Patient   triamcinolone (KENALOG) 0.1 % cream Apply sparingly to affected area 2-3 times daily for up to 14 days at a time.  Patient not taking: Reported on 8/29/2017 12/14/16   Brenna Morse MD   albuterol (PROAIR HFA, PROVENTIL HFA, VENTOLIN HFA) 108 (90 BASE) MCG/ACT inhaler Inhale 2 puffs into the lungs every 6 hours as needed for shortness of breath / dyspnea 7/5/16   Brenna Morse MD   Ibuprofen (ADVIL PO)     Reported, Patient   FOLIC ACID PO Take 1 mg by mouth daily    Reported, Patient       Allergies   Allergen Reactions     Cephalexin Itching     Sulfa Drugs         REVIEW OF SYSTEMS:   5 point ROS negative except as noted above in HPI, including Gen., Resp., CV, GI &  system review.    PHYSICAL EXAM:   LMP 04/28/2009 Estimated body mass index is 21.61 kg/(m^2) as calculated from the following:    Height as of 8/29/17: 1.702 m (5' 7\").    Weight as of 8/29/17: 62.6 kg (138 lb).   GENERAL APPEARANCE: alert, and oriented  MENTAL STATUS: alert  AIRWAY EXAM: Mallampatti Class I (visualization of the soft palate, fauces, uvula, anterior and posterior pillars)  RESP: lungs clear to auscultation - no rales, rhonchi or wheezes  CV: regular rates and rhythm  DIAGNOSTICS:    Not indicated    IMPRESSION   ASA Class 1 - Healthy patient, no medical problems    PLAN:   Plan for Colonoscopy with possible biopsy, " possible polypectomy and Gastroscopy with possible biopsy, possible dilation. We discussed the risks, benefits and alternatives and the patient wished to proceed.    The above has been forwarded to the consulting provider.      Signed Electronically by: Chad Wade  January 15, 2018

## 2018-01-16 LAB — COPATH REPORT: NORMAL

## 2018-05-18 ENCOUNTER — TELEPHONE (OUTPATIENT)
Dept: INTERNAL MEDICINE | Facility: CLINIC | Age: 53
End: 2018-05-18

## 2018-05-18 NOTE — TELEPHONE ENCOUNTER
Pt called. Stated she is filling out a health care assessment form for her employer, and just requested BP from 8/2017 px. Gave pt in

## 2018-06-28 NOTE — PROGRESS NOTES
"  SUBJECTIVE:   Kari Christian is a 52 year old female who presents to clinic today for the following health issues:    Chest Pressure      Duration: 5-6 days    Description (location/character/radiation): Feeling fatigued constantly    Intensity:  moderate    Accompanying signs and symptoms: Chest pressure and SOB on day 1 and 2 (feeling better now)    History (similar episodes/previous evaluation): Spherocytosis (gets anemic)    Precipitating or alleviating factors: None    Therapies tried and outcome: None     Wants to check iron levels with lab work. Patient had an EKG in 2016 and a stress test in 2013.    Chest pressure and SOB.  Not related to exertion, not relieved by rest.  Was constant and went away on it's own with time.  Symptoms start Monday night and went into Tuesday morning - all of Tuesday felt \"crappy.\"  Associated \"fogginess\" (which is chronic, but worse with symptoms).  No palpitations, no dizziness or syncope.    Other issues:  -- Does report pre-syncope symptoms and low bp    BP Readings from Last 3 Encounters:   06/29/18 94/60   01/15/18 92/60   08/29/17 110/70         Problem list and histories reviewed & adjusted, as indicated.  Additional history: as documented    Patient Active Problem List   Diagnosis     Hereditary spherocytosis (H)     Major depressive disorder, single episode, mild (H)     CARDIOVASCULAR SCREENING; LDL GOAL LESS THAN 160     Anxiety     Vitamin D deficiency     Hyperthyroidism     Tobacco abuse     Intermittent asthma     Past Surgical History:   Procedure Laterality Date     CHOLECYSTECTOMY  2007     COLONOSCOPY  09/2014     COLONOSCOPY  01/15/2018    Dr. Wade [facility]     ESOPHAGOSCOPY, GASTROSCOPY, DUODENOSCOPY (EGD), COMBINED N/A 1/15/2018    Procedure: COMBINED ESOPHAGOSCOPY, GASTROSCOPY, DUODENOSCOPY (EGD), BIOPSY SINGLE OR MULTIPLE;  ESOPHAGOSCOPY, GASTROSCOPY, DUODENOSCOPY (EGD) with cold biopsies of duodenum and  COLONOSCOPY with polypectomy;  Surgeon: Sheri, " "Chad PARTIDA MD;  Location:  GI     HEAD & NECK SURGERY      wisdom teeth removed       Social History   Substance Use Topics     Smoking status: Current Every Day Smoker     Packs/day: 0.30     Years: 20.00     Types: Cigarettes     Smokeless tobacco: Never Used     Alcohol use 1.2 - 1.8 oz/week     2 - 3 Standard drinks or equivalent per week      Comment: socially     Family History   Problem Relation Age of Onset     Hypertension Mother      Arthritis Mother      Respiratory Mother      Emphysema     GASTROINTESTINAL DISEASE Mother      Circulatory Mother      C.A.D. Father 59     Hypertension Father      Cancer Father      Esophageal     GASTROINTESTINAL DISEASE Father      Gallstones, Gallbladder removal, Colostomy, diverticulitis     Cancer Maternal Grandfather      Kidney     Cancer Paternal Grandfather      Lung, smoker     Breast Cancer Sister      Thyroid Disease Sister      Cancer - colorectal No family hx of      Colon Cancer No family hx of          Current Outpatient Prescriptions   Medication Sig Dispense Refill     albuterol (PROAIR HFA, PROVENTIL HFA, VENTOLIN HFA) 108 (90 BASE) MCG/ACT inhaler Inhale 2 puffs into the lungs every 6 hours as needed for shortness of breath / dyspnea 3 Inhaler 1     FOLIC ACID PO Take 1 mg by mouth daily       Ibuprofen (ADVIL PO)        triamcinolone (KENALOG) 0.1 % cream Apply sparingly to affected area 2-3 times daily for up to 14 days at a time. (Patient not taking: Reported on 8/29/2017) 30 g 0     Allergies   Allergen Reactions     Cephalexin Itching     Sulfa Drugs        Reviewed and updated as needed this visit by clinical staff       Reviewed and updated as needed this visit by Provider         ROS:  All other systems on a 10-point review are negative, unless otherwise noted in HPI      OBJECTIVE:     BP 94/60 (BP Location: Right arm, Patient Position: Chair, Cuff Size: Adult Regular)  Pulse 87  Temp 98.8  F (37.1  C) (Oral)  Ht 5' 7\" (1.702 m)  Wt 143 lb " 14.4 oz (65.3 kg)  LMP 04/28/2009  SpO2 99%  BMI 22.54 kg/m2  Body mass index is 22.54 kg/(m^2).  GENERAL: healthy, alert and no distress  EYES: Eyes grossly normal to inspection, PERRL and conjunctivae and sclerae normal  HENT: ear canals and TM's normal, nose and mouth without ulcers or lesions  NECK: no adenopathy, no asymmetry, masses, or scars and thyroid normal to palpation  RESP: lungs clear to auscultation - no rales, rhonchi or wheezes  CV: regular rate and rhythm, normal S1 S2, no S3 or S4, no murmur, click or rub, no peripheral edema and peripheral pulses strong  ABDOMEN: soft, nontender, no hepatosplenomegaly, no masses and bowel sounds normal  MS: no gross musculoskeletal defects noted, no edema  SKIN: no suspicious lesions or rashes  NEURO: Normal strength and tone, mentation intact and speech normal  PSYCH: mentation appears normal, affect normal/bright    Diagnostic Test Results:  Normal EKG in 2016, neg stress ECHO 2013    ASSESSMENT/PLAN:       1. Atypical chest pain  Reassuring features - likely non-cardiac.  Non-exertional, not relieved by rest, resolved on own.  Risk factors: tobacco use only - otherwise, no HTN, HLD, DM or other CV risk factors.  Had neg stress test in 2013.  Provided reassurance that this is likely non anginal.  Other etiologies include decompensated anemia, GI reflux, anxiety.  - CBC with platelets    2. Other specified hypotension  Chart review reveals pts BP runs low, however she does report feeling light headed at times, however no syncope.  Recommend she drink fluids and f/u with PCP if gets worse.    3. Tobacco abuse  Recommended smoking cessation to optimize physical health and decrease long term risk of heart disease, as pt expressed deep concern about CV disease.    4. Anxiety  Pt with high scores for anxiety and depression.  Recommended to f/u with PCP to discuss this further.  Discussed with pt that her symptoms could be related to mood disorders too.    PHQ-9  SCORE 1/19/2015 12/23/2016 8/29/2017   Total Score 7 - -   Total Score - 8 14     AUDREY-7 SCORE 2/20/2012 12/23/2016 8/29/2017   Total Score 5 - -   Total Score - 12 13       5. Hereditary spherocytosis (H)  In setting of underlying hematological disease, will r/o anemia as cause of presenting symptoms.  - CBC with platelets  - will call pt with results    Patient Instructions   Chest pain:  -- Your signs, symptoms, and physical exam are very reassuring.  I do not think this is related to heart disease.  -- Will recheck hemoglobin level today to make sure your anemia is not worse  -- I recommend that you follow-up with your PCP within the next month.    Other potential causes:  -- Reflux  -- Anemia  -- Anxiety  -- Lung (but less likely in your case)    I recommend discussing anxiety and depression symptoms with your PCP.    F/u in 1 month with PCP    Deepthi Giordano MD  [facility]

## 2018-06-29 ENCOUNTER — OFFICE VISIT (OUTPATIENT)
Dept: PEDIATRICS | Facility: CLINIC | Age: 53
End: 2018-06-29
Payer: COMMERCIAL

## 2018-06-29 VITALS
WEIGHT: 143.9 LBS | HEIGHT: 67 IN | DIASTOLIC BLOOD PRESSURE: 60 MMHG | TEMPERATURE: 98.8 F | SYSTOLIC BLOOD PRESSURE: 94 MMHG | BODY MASS INDEX: 22.58 KG/M2 | HEART RATE: 87 BPM | OXYGEN SATURATION: 99 %

## 2018-06-29 DIAGNOSIS — F41.9 ANXIETY: ICD-10-CM

## 2018-06-29 DIAGNOSIS — R07.89 ATYPICAL CHEST PAIN: Primary | ICD-10-CM

## 2018-06-29 DIAGNOSIS — I95.89 OTHER SPECIFIED HYPOTENSION: ICD-10-CM

## 2018-06-29 DIAGNOSIS — Z72.0 TOBACCO ABUSE: ICD-10-CM

## 2018-06-29 DIAGNOSIS — D58.0 HEREDITARY SPHEROCYTOSIS (H): ICD-10-CM

## 2018-06-29 LAB
ERYTHROCYTE [DISTWIDTH] IN BLOOD BY AUTOMATED COUNT: 20.4 % (ref 10–15)
HCT VFR BLD AUTO: 28.5 % (ref 35–47)
HGB BLD-MCNC: 9.7 G/DL (ref 11.7–15.7)
MCH RBC QN AUTO: 31.2 PG (ref 26.5–33)
MCHC RBC AUTO-ENTMCNC: 34 G/DL (ref 31.5–36.5)
MCV RBC AUTO: 92 FL (ref 78–100)
PLATELET # BLD AUTO: 238 10E9/L (ref 150–450)
RBC # BLD AUTO: 3.11 10E12/L (ref 3.8–5.2)
WBC # BLD AUTO: 6.7 10E9/L (ref 4–11)

## 2018-06-29 PROCEDURE — 99214 OFFICE O/P EST MOD 30 MIN: CPT | Performed by: INTERNAL MEDICINE

## 2018-06-29 PROCEDURE — 85027 COMPLETE CBC AUTOMATED: CPT | Performed by: INTERNAL MEDICINE

## 2018-06-29 PROCEDURE — 36415 COLL VENOUS BLD VENIPUNCTURE: CPT | Performed by: INTERNAL MEDICINE

## 2018-06-29 ASSESSMENT — ANXIETY QUESTIONNAIRES
2. NOT BEING ABLE TO STOP OR CONTROL WORRYING: SEVERAL DAYS
GAD7 TOTAL SCORE: 6
6. BECOMING EASILY ANNOYED OR IRRITABLE: SEVERAL DAYS
3. WORRYING TOO MUCH ABOUT DIFFERENT THINGS: SEVERAL DAYS
IF YOU CHECKED OFF ANY PROBLEMS ON THIS QUESTIONNAIRE, HOW DIFFICULT HAVE THESE PROBLEMS MADE IT FOR YOU TO DO YOUR WORK, TAKE CARE OF THINGS AT HOME, OR GET ALONG WITH OTHER PEOPLE: SOMEWHAT DIFFICULT
1. FEELING NERVOUS, ANXIOUS, OR ON EDGE: MORE THAN HALF THE DAYS
7. FEELING AFRAID AS IF SOMETHING AWFUL MIGHT HAPPEN: NOT AT ALL
5. BEING SO RESTLESS THAT IT IS HARD TO SIT STILL: NOT AT ALL

## 2018-06-29 ASSESSMENT — PATIENT HEALTH QUESTIONNAIRE - PHQ9: 5. POOR APPETITE OR OVEREATING: SEVERAL DAYS

## 2018-06-29 NOTE — PATIENT INSTRUCTIONS
Chest pain:  -- Your signs, symptoms, and physical exam are very reassuring.  I do not think this is related to heart disease.  -- Will recheck hemoglobin level today to make sure your anemia is not worse  -- I recommend that you follow-up with your PCP within the next month.    Other potential causes:  -- Reflux  -- Anemia  -- Anxiety  -- Lung (but less likely in your case)    I recommend discussing anxiety and depression symptoms with your PCP.

## 2018-06-29 NOTE — LETTER
My Depression Action Plan  Name: Kari Christian   Date of Birth 1965  Date: 6/29/2018    My doctor: Brenna Morse   My clinic: [address]  [address]  [facility] [address] [phone]          GREEN    ZONE   Good Control    What it looks like:     Things are going generally well. You have normal up s and down s. You may even feel depressed from time to time, but bad moods usually last less than a day.   What you need to do:  1. Continue to care for yourself (see self care plan)  2. Check your depression survival kit and update it as needed  3. Follow your physician s recommendations including any medication.  4. Do not stop taking medication unless you consult with your physician first.           YELLOW         ZONE Getting Worse    What it looks like:     Depression is starting to interfere with your life.     It may be hard to get out of bed; you may be starting to isolate yourself from others.    Symptoms of depression are starting to last most all day and this has happened for several days.     You may have suicidal thoughts but they are not constant.   What you need to do:     1. Call your care team, your response to treatment will improve if you keep your care team informed of your progress. Yellow periods are signs an adjustment may need to be made.     2. Continue your self-care, even if you have to fake it!    3. Talk to someone in your support network    4. Open up your depression survival kit           RED    ZONE Medical Alert - Get Help    What it looks like:     Depression is seriously interfering with your life.     You may experience these or other symptoms: You can t get out of bed most days, can t work or engage in other necessary activities, you have trouble taking care of basic hygiene, or basic responsibilities, thoughts of suicide or death that will not go away, self-injurious behavior.     What you need to do:  1. Call your  care team and request a same-day appointment. If they are not available (weekends or after hours) call your local crisis line, emergency room or 911.            Depression Self Care Plan / Survival Kit    Self-Care for Depression  Here s the deal. Your body and mind are really not as separate as most people think.  What you do and think affects how you feel and how you feel influences what you do and think. This means if you do things that people who feel good do, it will help you feel better.  Sometimes this is all it takes.  There is also a place for medication and therapy depending on how severe your depression is, so be sure to consult with your medical provider and/ or Behavioral Health Consultant if your symptoms are worsening or not improving.     In order to better manage my stress, I will:    Exercise  Get some form of exercise, every day. This will help reduce pain and release endorphins, the  feel good  chemicals in your brain. This is almost as good as taking antidepressants!  This is not the same as joining a gym and then never going! (they count on that by the way ) It can be as simple as just going for a walk or doing some gardening, anything that will get you moving.      Hygiene   Maintain good hygiene (Get out of bed in the morning, Make your bed, Brush your teeth, Take a shower, and Get dressed like you were going to work, even if you are unemployed).  If your clothes don't fit try to get ones that do.    Diet  I will strive to eat foods that are good for me, drink plenty of water, and avoid excessive sugar, caffeine, alcohol, and other mood-altering substances.  Some foods that are helpful in depression are: complex carbohydrates, B vitamins, flaxseed, fish or fish oil, fresh fruits and vegetables.    Psychotherapy  I agree to participate in Individual Therapy (if recommended).    Medication  If prescribed medications, I agree to take them.  Missing doses can result in serious side effects.  I  understand that drinking alcohol, or other illicit drug use, may cause potential side effects.  I will not stop my medication abruptly without first discussing it with my provider.    Staying Connected With Others  I will stay in touch with my friends, family members, and my primary care provider/team.    Use your imagination  Be creative.  We all have a creative side; it doesn t matter if it s oil painting, sand castles, or mud pies! This will also kick up the endorphins.    Witness Beauty  (AKA stop and smell the roses) Take a look outside, even in mid-winter. Notice colors, textures. Watch the squirrels and birds.     Service to others  Be of service to others.  There is always someone else in need.  By helping others we can  get out of ourselves  and remember the really important things.  This also provides opportunities for practicing all the other parts of the program.    Humor  Laugh and be silly!  Adjust your TV habits for less news and crime-drama and more comedy.    Control your stress  Try breathing deep, massage therapy, biofeedback, and meditation. Find time to relax each day.     My support system    Clinic Contact:  Phone number:    Contact 1:  Phone number:    Contact 2:  Phone number:    Faith/:  Phone number:    Therapist:  Phone number:    Local crisis center:    Phone number:    Other community support:  Phone number:

## 2018-06-29 NOTE — LETTER
My Asthma Action Plan  Name: Kari Christian   YOB: 1965  Date: 6/29/2018   My doctor: Deepthi Giordano MD   My clinic: [facility]        My Control Medicine: None  My Rescue Medicine: Albuterol (Proair/Ventolin/Proventil) inhaler as needed   My Asthma Severity: intermittent  Avoid your asthma triggers: Patient aware of triggers               GREEN ZONE   Good Control    I feel good    No cough or wheeze    Can work, sleep and play without asthma symptoms       Take your asthma control medicine every day.     1. If exercise triggers your asthma, take your rescue medication    15 minutes before exercise or sports, and    During exercise if you have asthma symptoms  2. Spacer to use with inhaler: If you have a spacer, make sure to use it with your inhaler             YELLOW ZONE Getting Worse  I have ANY of these:    I do not feel good    Cough or wheeze    Chest feels tight    Wake up at night   1. Keep taking your Green Zone medications  2. Start taking your rescue medicine:    every 20 minutes for up to 1 hour. Then every 4 hours for 24-48 hours.  3. If you stay in the Yellow Zone for more than 12-24 hours, contact your doctor.  4. If you do not return to the Green Zone in 12-24 hours or you get worse, start taking your oral steroid medicine if prescribed by your provider.           RED ZONE Medical Alert - Get Help  I have ANY of these:    I feel awful    Medicine is not helping    Breathing getting harder    Trouble walking or talking    Nose opens wide to breathe       1. Take your rescue medicine NOW  2. If your provider has prescribed an oral steroid medicine, start taking it NOW  3. Call your doctor NOW  4. If you are still in the Red Zone after 20 minutes and you have not reached your doctor:    Take your rescue medicine again and    Call 911 or go to the emergency room right away    See your regular doctor within 2 weeks of an Emergency Room or Urgent Care visit for follow-up  treatment.          Annual Reminders:  Meet with Asthma Educator,  Flu Shot in the Fall, consider Pneumonia Vaccination for patients with asthma (aged 19 and older).    Pharmacy: [id number] - [place], [place] - [address] AT CORNER OF [facility]                      Asthma Triggers  How To Control Things That Make Your Asthma Worse    Triggers are things that make your asthma worse.  Look at the list below to help you find your triggers and what you can do about them.  You can help prevent asthma flare-ups by staying away from your triggers.      Trigger                                                          What you can do   Cigarette Smoke  Tobacco smoke can make asthma worse. Do not allow smoking in your home, car or around you.  Be sure no one smokes at a child s day care or school.  If you smoke, ask your health care provider for ways to help you quit.  Ask family members to quit too.  Ask your health care provider for a referral to Quit Plan to help you quit smoking, or call 9-090-758-PLAN.     Colds, Flu, Bronchitis  These are common triggers of asthma. Wash your hands often.  Don t touch your eyes, nose or mouth.  Get a flu shot every year.     Dust Mites  These are tiny bugs that live in cloth or carpet. They are too small to see. Wash sheets and blankets in hot water every week.   Encase pillows and mattress in dust mite proof covers.  Avoid having carpet if you can. If you have carpet, vacuum weekly.   Use a dust mask and HEPA vacuum.   Pollen and Outdoor Mold  Some people are allergic to trees, grass, or weed pollen, or molds. Try to keep your windows closed.  Limit time out doors when pollen count is high.   Ask you health care provider about taking medicine during allergy season.     Animal Dander  Some people are allergic to skin flakes, urine or saliva from pets with fur or feathers. Keep pets with fur or feathers out of your home.    If you can t keep the pet outdoors, then keep  the pet out of your bedroom.  Keep the bedroom door closed.  Keep pets off cloth furniture and away from stuffed toys.     Mice, Rats, and Cockroaches  Some people are allergic to the waste from these pests.   Cover food and garbage.  Clean up spills and food crumbs.  Store grease in the refrigerator.   Keep food out of the bedroom.   Indoor Mold  This can be a trigger if your home has high moisture. Fix leaking faucets, pipes, or other sources of water.   Clean moldy surfaces.  Dehumidify basement if it is damp and smelly.   Smoke, Strong Odors, and Sprays  These can reduce air quality. Stay away from strong odors and sprays, such as perfume, powder, hair spray, paints, smoke incense, paint, cleaning products, candles and new carpet.   Exercise or Sports  Some people with asthma have this trigger. Be active!  Ask your doctor about taking medicine before sports or exercise to prevent symptoms.    Warm up for 5-10 minutes before and after sports or exercise.     Other Triggers of Asthma  Cold air:  Cover your nose and mouth with a scarf.  Sometimes laughing or crying can be a trigger.  Some medicines and food can trigger asthma.

## 2018-06-30 ASSESSMENT — PATIENT HEALTH QUESTIONNAIRE - PHQ9: SUM OF ALL RESPONSES TO PHQ QUESTIONS 1-9: 10

## 2018-06-30 ASSESSMENT — ANXIETY QUESTIONNAIRES: GAD7 TOTAL SCORE: 6

## 2018-06-30 ASSESSMENT — ASTHMA QUESTIONNAIRES: ACT_TOTALSCORE: 21

## 2018-07-11 ENCOUNTER — TELEPHONE (OUTPATIENT)
Dept: INTERNAL MEDICINE | Facility: CLINIC | Age: 53
End: 2018-07-11

## 2018-07-11 NOTE — TELEPHONE ENCOUNTER
Recommend appt with this, as it has almost been one year.  And would need to review medical reasons, as current problem list would not necessarily correlate.

## 2018-07-15 ENCOUNTER — HEALTH MAINTENANCE LETTER (OUTPATIENT)
Age: 53
End: 2018-07-15

## 2018-07-16 NOTE — TELEPHONE ENCOUNTER
Patient calls back regarding message to schedule an appointment. She states her appointment in [place] last month was to address these concerns. Informed patient that 6/29/18 office visit notes focused on chest pain, anxiety and depression. Patient states she thinks they are all related and that being able to stand at work will help. Patient advised she needs to schedule appointment with Dr. Morse to have forms filled out. Offered next available appointment, patient would like to check with [place] [facility] to see if Dr. Giordano could fill this out based on exam. If not, patient will call to schedule an appointment in our office.

## 2018-07-23 ENCOUNTER — OFFICE VISIT (OUTPATIENT)
Dept: PEDIATRICS | Facility: CLINIC | Age: 53
End: 2018-07-23
Payer: COMMERCIAL

## 2018-07-23 VITALS
HEART RATE: 86 BPM | TEMPERATURE: 98 F | SYSTOLIC BLOOD PRESSURE: 100 MMHG | WEIGHT: 143 LBS | DIASTOLIC BLOOD PRESSURE: 60 MMHG | OXYGEN SATURATION: 97 % | BODY MASS INDEX: 22.4 KG/M2

## 2018-07-23 DIAGNOSIS — M54.50 BILATERAL LOW BACK PAIN WITHOUT SCIATICA, UNSPECIFIED CHRONICITY: Primary | ICD-10-CM

## 2018-07-23 PROCEDURE — 99213 OFFICE O/P EST LOW 20 MIN: CPT | Performed by: NURSE PRACTITIONER

## 2018-07-23 NOTE — MR AVS SNAPSHOT
After Visit Summary   7/23/2018    Kari Christian    MRN: [id number]           Patient Information     Date Of Birth          1965        Visit Information        Provider Department      7/23/2018 1:40 PM Elvira Silvestre APRN CNP [facility]        Today's Diagnoses     Bilateral low back pain without sciatica, unspecified chronicity    -  1       Follow-ups after your visit        Follow-up notes from your care team     Return in about 1 week (around 7/30/2018), or if symptoms worsen or fail to improve.      Who to contact     If you have questions or need follow up information about today's clinic visit or your schedule please contact [facility] directly at [phone].  Normal or non-critical lab and imaging results will be communicated to you by MyChart, letter or phone within 4 business days after the clinic has received the results. If you do not hear from us within 7 days, please contact the clinic through weendyhart or phone. If you have a critical or abnormal lab result, we will notify you by phone as soon as possible.  Submit refill requests through Netatmo or call your pharmacy and they will forward the refill request to us. Please allow 3 business days for your refill to be completed.          Additional Information About Your Visit        MyChart Information     Net[employer]mo gives you secure access to your electronic health record. If you see a primary care provider, you can also send messages to your care team and make appointments. If you have questions, please call your primary care clinic.  If you do not have a primary care provider, please call [phone] and they will assist you.        Care EveryWhere ID     This is your Care EveryWhere ID. This could be used by other organizations to access your [place] medical records  [id number]        Your Vitals Were     Pulse Temperature Last Period Pulse Oximetry BMI (Body Mass Index)       86 98  F (36.7  C)  (Oral) 04/28/2009 97% 22.4 kg/m2        Blood Pressure from Last 3 Encounters:   07/23/18 100/60   06/29/18 94/60   01/15/18 92/60    Weight from Last 3 Encounters:   07/23/18 143 lb (64.9 kg)   06/29/18 143 lb 14.4 oz (65.3 kg)   01/15/18 140 lb (63.5 kg)              Today, you had the following     No orders found for display       Primary Care Provider Office Phone # Fax #    Brenna Morse [phone]       [address] [facility] [address]        Equal Access to Services     [facility] : Hadii nino holley hadnuryso Soronan, waaxda luqadaha, qaybta kaalmada adereyyada, jennifer florez . So [facility] [phone].    ATENCIÓN: Si habla español, tiene a barajas disposición servicios gratuitos de asistencia lingüística. Llame al [phone].    We comply with applicable federal civil rights laws and [place] laws. We do not discriminate on the basis of race, color, national origin, age, disability, sex, sexual orientation, or gender identity.            Thank you!     Thank you for choosing [facility] [place]  for your care. Our goal is always to provide you with excellent care. Hearing back from our patients is one way we can continue to improve our services. Please take a few minutes to complete the written survey that you may receive in the mail after your visit with us. Thank you!             Your Updated Medication List - Protect others around you: Learn how to safely use, store and throw away your medicines at www.disposemymeds.org.          This list is accurate as of 7/23/18  2:04 PM.  Always use your most recent med list.                   Brand Name Dispense Instructions for use Diagnosis    ADVIL PO           albuterol 108 (90 Base) MCG/ACT Inhaler    PROAIR HFA/PROVENTIL HFA/VENTOLIN HFA    3 Inhaler    Inhale 2 puffs into the lungs every 6 hours as needed for shortness of breath / dyspnea    SOB (shortness of breath)       FOLIC ACID PO      Take 1 mg by  mouth daily        triamcinolone 0.1 % cream    KENALOG    30 g    Apply sparingly to affected area 2-3 times daily for up to 14 days at a time.    Rash

## 2018-07-23 NOTE — PROGRESS NOTES
Patient with a history of low back pain without radiculopathy, worsened with sitting. No leg weakness or pain.    ROS: const/msk/neuro otherwise negative     OBJECTIVE:  /60 (Cuff Size: Adult Regular)  Pulse 86  Temp 98  F (36.7  C) (Oral)  Wt 143 lb (64.9 kg)  LMP 04/28/2009  SpO2 97%  BMI 22.4 kg/m2  CONSTITUTIONAL: Alert, well-nourished, well-groomed, NAD  RESP: Lungs CTA. No wheeze, rhonchi, rales.  CV: HRRR S1 S2 No MRG. No peripheral edema  MSK: No deformity of spine. No TTP midline lumbar spine. No TTP paraspinal muscles. No TTP SI joint. 5/5 strength LEs.  NEURO: +2 DTRs. Negative SLR.       ASSESSMENT/PLAN:  (M54.5) Bilateral low back pain without sciatica, unspecified chronicity  (primary encounter diagnosis)  Comment: LBP worsened by sitting. This issues is mild, intermittent, and chronic. Here today only to get a standing desk.  Plan:   -Paperwork signed to get a standing/sitting desk.   -Discussed reasons to seek care urgently.         Elvira Silvestre, ZAK-DNP.

## 2018-09-01 ENCOUNTER — TRANSFERRED RECORDS (OUTPATIENT)
Dept: HEALTH INFORMATION MANAGEMENT | Facility: CLINIC | Age: 53
End: 2018-09-01

## 2018-09-01 LAB — PAP SMEAR - HIM PATIENT REPORTED: NEGATIVE

## 2018-10-18 ENCOUNTER — TELEPHONE (OUTPATIENT)
Dept: INTERNAL MEDICINE | Facility: CLINIC | Age: 53
End: 2018-10-18

## 2018-10-18 NOTE — TELEPHONE ENCOUNTER
Panel Management Review      Patient has the following on her problem list: None      Composite cancer screening  Chart review shows that this patient is due/due soon for the following Mammogram  Summary:    Patient is due/failing the following:   MAMMOGRAM    Action needed:   Mammogram    Type of outreach:    None, patient has mammogram scheduled 10/19/2018.    Questions for provider review:    None                                                                                                                                    Karime MONK       Chart routed to closed .

## 2018-10-19 ENCOUNTER — HOSPITAL ENCOUNTER (OUTPATIENT)
Dept: ULTRASOUND IMAGING | Facility: CLINIC | Age: 53
End: 2018-10-19
Attending: OBSTETRICS & GYNECOLOGY
Payer: COMMERCIAL

## 2018-10-19 ENCOUNTER — HOSPITAL ENCOUNTER (OUTPATIENT)
Dept: MAMMOGRAPHY | Facility: CLINIC | Age: 53
Discharge: HOME | End: 2018-10-19
Attending: OBSTETRICS & GYNECOLOGY | Admitting: OBSTETRICS & GYNECOLOGY
Payer: COMMERCIAL

## 2018-10-19 DIAGNOSIS — N64.4 MASTODYNIA: ICD-10-CM

## 2018-10-19 DIAGNOSIS — N63.0 SWELLING OF BREAST: ICD-10-CM

## 2018-10-19 PROCEDURE — 76642 ULTRASOUND BREAST LIMITED: CPT | Mod: LT

## 2018-10-19 PROCEDURE — G0279 TOMOSYNTHESIS, MAMMO: HCPCS

## 2019-02-07 ENCOUNTER — OFFICE VISIT (OUTPATIENT)
Dept: PEDIATRICS | Facility: CLINIC | Age: 54
End: 2019-02-07
Payer: COMMERCIAL

## 2019-02-07 VITALS
TEMPERATURE: 98.4 F | HEART RATE: 108 BPM | DIASTOLIC BLOOD PRESSURE: 54 MMHG | RESPIRATION RATE: 16 BRPM | HEIGHT: 67 IN | WEIGHT: 138.5 LBS | SYSTOLIC BLOOD PRESSURE: 96 MMHG | OXYGEN SATURATION: 97 % | BODY MASS INDEX: 21.74 KG/M2

## 2019-02-07 DIAGNOSIS — J01.90 ACUTE SINUSITIS WITH SYMPTOMS > 10 DAYS: ICD-10-CM

## 2019-02-07 DIAGNOSIS — R07.0 THROAT PAIN: Primary | ICD-10-CM

## 2019-02-07 LAB
DEPRECATED S PYO AG THROAT QL EIA: NORMAL
SPECIMEN SOURCE: NORMAL

## 2019-02-07 PROCEDURE — 99213 OFFICE O/P EST LOW 20 MIN: CPT | Performed by: PHYSICIAN ASSISTANT

## 2019-02-07 PROCEDURE — 87880 STREP A ASSAY W/OPTIC: CPT | Performed by: PHYSICIAN ASSISTANT

## 2019-02-07 PROCEDURE — 87081 CULTURE SCREEN ONLY: CPT | Performed by: PHYSICIAN ASSISTANT

## 2019-02-07 RX ORDER — DOXYCYCLINE HYCLATE 100 MG
100 TABLET ORAL 2 TIMES DAILY
Qty: 14 TABLET | Refills: 0 | Status: SHIPPED | OUTPATIENT
Start: 2019-02-07 | End: 2019-03-22

## 2019-02-07 ASSESSMENT — ENCOUNTER SYMPTOMS
HEADACHES: 1
SORE THROAT: 1
DIARRHEA: 1
CHILLS: 0
NAUSEA: 0
FOCAL WEAKNESS: 0
SHORTNESS OF BREATH: 0
ABDOMINAL PAIN: 0
FEVER: 0
VOMITING: 0
SINUS PAIN: 1
COUGH: 1

## 2019-02-07 ASSESSMENT — MIFFLIN-ST. JEOR: SCORE: 1248.47

## 2019-02-07 NOTE — PROGRESS NOTES
SUBJECTIVE:                                                    Kari Christian is a 53 year old female who presents to clinic today for the following health issues:  {Provider please address medication reconciliation discrepancies--rooming staff please delete if no med/rec issues}    {Superlists:986465}      Kari Christian is a 52 yo female with history of MDD and hereditary spheocytosis presenting with ongoing sore throat. ***    Problem list and histories reviewed & adjusted, as indicated.  Additional history: as documented    Patient Active Problem List   Diagnosis     Hereditary spherocytosis (H)     Major depressive disorder, single episode, mild (H)     CARDIOVASCULAR SCREENING; LDL GOAL LESS THAN 160     Anxiety     Vitamin D deficiency     Hyperthyroidism     Tobacco abuse     Intermittent asthma     Past Surgical History:   Procedure Laterality Date     CHOLECYSTECTOMY  2007     COLONOSCOPY  09/2014     COLONOSCOPY  01/15/2018    Dr. Wade [facility]     ESOPHAGOSCOPY, GASTROSCOPY, DUODENOSCOPY (EGD), COMBINED N/A 1/15/2018    Procedure: COMBINED ESOPHAGOSCOPY, GASTROSCOPY, DUODENOSCOPY (EGD), BIOPSY SINGLE OR MULTIPLE;  ESOPHAGOSCOPY, GASTROSCOPY, DUODENOSCOPY (EGD) with cold biopsies of duodenum and  COLONOSCOPY with polypectomy;  Surgeon: Chad Wade MD;  Location:  GI     HEAD & NECK SURGERY      wisdom teeth removed       Social History     Tobacco Use     Smoking status: Current Every Day Smoker     Packs/day: 0.30     Years: 20.00     Pack years: 6.00     Types: Cigarettes     Smokeless tobacco: Never Used   Substance Use Topics     Alcohol use: Yes     Alcohol/week: 1.2 - 1.8 oz     Types: 2 - 3 Standard drinks or equivalent per week     Comment: socially     Family History   Problem Relation Age of Onset     Hypertension Mother      Arthritis Mother      Respiratory Mother         Emphysema     Gastrointestinal Disease Mother      Circulatory Mother      C.A.D. Father 59     Hypertension Father       Cancer Father         Esophageal     Gastrointestinal Disease Father         Gallstones, Gallbladder removal, Colostomy, diverticulitis     Cancer Maternal Grandfather         Kidney     Cancer Paternal Grandfather         Lung, smoker     Breast Cancer Sister      Thyroid Disease Sister      Cancer - colorectal No family hx of      Colon Cancer No family hx of          Current Outpatient Medications   Medication Sig Dispense Refill     albuterol (PROAIR HFA, PROVENTIL HFA, VENTOLIN HFA) 108 (90 BASE) MCG/ACT inhaler Inhale 2 puffs into the lungs every 6 hours as needed for shortness of breath / dyspnea 3 Inhaler 1     FOLIC ACID PO Take 1 mg by mouth daily       Ibuprofen (ADVIL PO)        triamcinolone (KENALOG) 0.1 % cream Apply sparingly to affected area 2-3 times daily for up to 14 days at a time. 30 g 0     Allergies   Allergen Reactions     Cephalexin Itching     Sulfa Drugs        ROS:  ***    OBJECTIVE:     LMP 04/28/2009   There is no height or weight on file to calculate BMI.    Exam:  General: the patient is pleasant, resting comfortably, no acute distress. ***  HEENT: Normocephalic, atraumatic.  Lungs: Clear to auscultation, no wheezes or crackles.   CV: RRR, nl s1 and s2, no m/r/g.   Abdomen: Soft, nondistended, nontender. No rebound or guarding. Bowel sounds active.  Extremities: No lower extremity edema. Peripheral pulses strong and symmetric.   Skin: no appreciable skin lesions/rashes on exposed skin.   Neuro: Alert and oriented x4, grossly normal neurologic exam.     Diagnostic Test Results:  ***    ASSESSMENT/PLAN:     {Diag Picklist:855972}    Follow up: ***      Josefa Gerber MD  Internal Medicine Pediatrics  [facility]

## 2019-02-07 NOTE — PROGRESS NOTES
HPI  SUBJECTIVE:   Kari Christian is a 53 year old female who presents to clinic today for the following health issues:    RESPIRATORY SYMPTOMS      Duration: over 3 weeks     Description  nasal congestion, rhinorrhea, sore throat, facial pain/pressure, cough, headache, fatigue/malaise, myalgias,run down    Severity: moderate    Accompanying signs and symptoms:  lost voice for 3 days, diarrhea. Bumps on back of tongue that are sore    History (predisposing factors):  Pt is a smoker; was exposed to pneumonia    Precipitating or alleviating factors: laying down is worse     Therapies tried and outcome:  Cough medicine, antacids, Vicks, humidifier, Advil Cold and Flu    Symptoms seem better one day, then worse the next.        Chart Review:  PHQ-9 SCORE 12/23/2016 8/29/2017 6/29/2018   PHQ-9 Total Score - - -   PHQ-9 Total Score 8 14 10     AUDREY-7 SCORE 12/23/2016 8/29/2017 6/29/2018   Total Score - - -   Total Score 12 13 6       Patient Active Problem List   Diagnosis     Hereditary spherocytosis (H)     Major depressive disorder, single episode, mild (H)     CARDIOVASCULAR SCREENING; LDL GOAL LESS THAN 160     Anxiety     Vitamin D deficiency     Hyperthyroidism     Tobacco abuse     Intermittent asthma     Past Surgical History:   Procedure Laterality Date     CHOLECYSTECTOMY  2007     COLONOSCOPY  09/2014     COLONOSCOPY  01/15/2018    Dr. Wade [facility]     ESOPHAGOSCOPY, GASTROSCOPY, DUODENOSCOPY (EGD), COMBINED N/A 1/15/2018    Procedure: COMBINED ESOPHAGOSCOPY, GASTROSCOPY, DUODENOSCOPY (EGD), BIOPSY SINGLE OR MULTIPLE;  ESOPHAGOSCOPY, GASTROSCOPY, DUODENOSCOPY (EGD) with cold biopsies of duodenum and  COLONOSCOPY with polypectomy;  Surgeon: Chad Wade MD;  Location:  GI     HEAD & NECK SURGERY      wisdom teeth removed     Family History   Problem Relation Age of Onset     Hypertension Mother      Arthritis Mother      Respiratory Mother         Emphysema     Gastrointestinal Disease Mother       Circulatory Mother      C.A.D. Father 59     Hypertension Father      Cancer Father         Esophageal     Gastrointestinal Disease Father         Gallstones, Gallbladder removal, Colostomy, diverticulitis     Cancer Maternal Grandfather         Kidney     Cancer Paternal Grandfather         Lung, smoker     Breast Cancer Sister      Thyroid Disease Sister      Cancer - colorectal No family hx of      Colon Cancer No family hx of       Social History     Tobacco Use     Smoking status: Current Every Day Smoker     Packs/day: 0.30     Years: 20.00     Pack years: 6.00     Types: Cigarettes     Smokeless tobacco: Never Used   Substance Use Topics     Alcohol use: Yes     Alcohol/week: 1.2 - 1.8 oz     Types: 2 - 3 Standard drinks or equivalent per week     Comment: socially        Problem list, Medication list, Allergies, Medical/Social/Surg hx reviewed in [facility], updated as appropriate.      Review of Systems   Constitutional: Positive for malaise/fatigue. Negative for chills and fever.   HENT: Positive for congestion, sinus pain and sore throat.         Hoarse voice   Respiratory: Positive for cough. Negative for shortness of breath.    Cardiovascular: Negative for chest pain.   Gastrointestinal: Positive for diarrhea. Negative for abdominal pain, nausea and vomiting.   Skin: Negative for rash.   Neurological: Positive for headaches. Negative for focal weakness.   All other systems reviewed and are negative.        Physical Exam   Constitutional: She is oriented to person, place, and time.   HENT:   Head: Normocephalic and atraumatic.   Right Ear: Tympanic membrane and external ear normal.   Left Ear: Tympanic membrane and external ear normal.   Nose: Mucosal edema present. Right sinus exhibits maxillary sinus tenderness. Left sinus exhibits maxillary sinus tenderness.   Mouth/Throat: Uvula is midline and mucous membranes are normal. Posterior oropharyngeal erythema present. No oropharyngeal exudate, posterior  "oropharyngeal edema or tonsillar abscesses.   Few bumps on back of tongue, mildly tender   Cardiovascular: Normal rate, regular rhythm and normal heart sounds.   Pulmonary/Chest: Effort normal and breath sounds normal.   Musculoskeletal: Normal range of motion.   Lymphadenopathy:     She has cervical adenopathy.        Right cervical: Superficial cervical adenopathy present.        Left cervical: Superficial cervical adenopathy present.   Neurological: She is alert and oriented to person, place, and time.   Skin: Skin is warm and dry.   Nursing note and vitals reviewed.    Vital Signs  BP 96/54 (BP Location: Right arm, Patient Position: Chair, Cuff Size: Adult Regular)   Pulse 108   Temp 98.4  F (36.9  C) (Oral)   Resp 16   Ht 1.69 m (5' 6.54\")   Wt 62.8 kg (138 lb 8 oz)   LMP 04/28/2009   SpO2 97%   BMI 22.00 kg/m     Body mass index is 22 kg/m .    Diagnostic Test Results:  Results for orders placed or performed in visit on 02/07/19 (from the past 24 hour(s))   Strep, Rapid Screen   Result Value Ref Range    Specimen Description Throat     Rapid Strep A Screen       NEGATIVE: No Group A streptococcal antigen detected by immunoassay, await culture report.       ASSESSMENT/PLAN:                                                        ICD-10-CM    1. Throat pain R07.0 Strep, Rapid Screen     Beta strep group A culture   2. Acute sinusitis with symptoms > 10 days J01.90 doxycycline hyclate (VIBRA-TABS) 100 MG tablet   Strep negative. Rx doxycycline for acute sinusitis- suspect bumps on back of tongue due to illness/postnasal drip. If bumps persist after other symptoms resolve, f/u with PCP. Mucinex, Flonase, fluids also recommended.    I have discussed any lab or imaging results, the patient's diagnosis, and my plan of treatment with the patient and/or family. Patient is aware to come back in if with worsening symptoms or if no relief despite treatment plan.  Patient voiced understanding and had no further " questions.       Follow Up: Return in about 2 weeks (around 2/21/2019) for Follow up w/ PCP if not better.    NITA Paredes, PA-C  [facility]

## 2019-02-07 NOTE — PROGRESS NOTES
"  SUBJECTIVE:   Kari Christian is a 53 year old female who presents to clinic today for the following health issues:      RESPIRATORY SYMPTOMS      Duration: over 3 weeks     Description  nasal congestion, rhinorrhea, sore throat, facial pain/pressure, cough, headache, fatigue/malaise, hoarse voice, myalgias, diarrhea and bloody noses, run down    Severity: moderate    Accompanying signs and symptoms: stomach, bumps on tongue, hot flashes, lost voice for 3 daysd     History (predisposing factors):  exposure to smoke and exposed to pneumonia, hx of upper respiratory problems     Precipitating or alleviating factors: laying down is worse     Therapies tried and outcome:  Cough medicine, antacids, Vicks, humidifier, Advil Cold and Flu      {additional problems for provider to add:180839}    Problem list and histories reviewed & adjusted, as indicated.  Additional history: {NONE - AS DOCUMENTED:054179::\"as documented\"}    {HIST REVIEW/ LINKS 2:999372}    Reviewed and updated as needed this visit by clinical staff       Reviewed and updated as needed this visit by Provider         {PROVIDER CHARTING PREFERENCE:830995}    "

## 2019-02-08 LAB
BACTERIA SPEC CULT: NORMAL
SPECIMEN SOURCE: NORMAL

## 2019-03-20 ENCOUNTER — TELEPHONE (OUTPATIENT)
Dept: INTERNAL MEDICINE | Facility: CLINIC | Age: 54
End: 2019-03-20

## 2019-03-20 DIAGNOSIS — Z00.00 ROUTINE GENERAL MEDICAL EXAMINATION AT A HEALTH CARE FACILITY: Primary | ICD-10-CM

## 2019-03-20 NOTE — TELEPHONE ENCOUNTER
Reason for Call:  Other call back    Detailed comments: The pt was calling and she would like her care team to place lab orders so that she can have them done before her upcoming physical. Ok to leave a detailed message when the orders have been placed.     Phone Number Patient can be reached at: Cell number on file:    Telephone Information:   Mobile [phone]       Best Time: Anytime    Can we leave a detailed message on this number? YES    Call taken on 3/20/2019 at 11:00 AM by Tabitha Rai

## 2019-03-22 ENCOUNTER — OFFICE VISIT (OUTPATIENT)
Dept: PEDIATRICS | Facility: CLINIC | Age: 54
End: 2019-03-22
Payer: COMMERCIAL

## 2019-03-22 ENCOUNTER — TELEPHONE (OUTPATIENT)
Dept: INTERNAL MEDICINE | Facility: CLINIC | Age: 54
End: 2019-03-22

## 2019-03-22 VITALS
HEART RATE: 80 BPM | WEIGHT: 135 LBS | BODY MASS INDEX: 21.69 KG/M2 | DIASTOLIC BLOOD PRESSURE: 64 MMHG | HEIGHT: 66 IN | SYSTOLIC BLOOD PRESSURE: 110 MMHG | TEMPERATURE: 98.3 F | OXYGEN SATURATION: 96 %

## 2019-03-22 DIAGNOSIS — K58.0 IRRITABLE BOWEL SYNDROME WITH DIARRHEA: ICD-10-CM

## 2019-03-22 DIAGNOSIS — K21.9 GASTROESOPHAGEAL REFLUX DISEASE WITHOUT ESOPHAGITIS: ICD-10-CM

## 2019-03-22 DIAGNOSIS — R07.89 CHEST TIGHTNESS: ICD-10-CM

## 2019-03-22 DIAGNOSIS — F41.1 GAD (GENERALIZED ANXIETY DISORDER): Primary | ICD-10-CM

## 2019-03-22 DIAGNOSIS — R10.13 EPIGASTRIC PAIN: ICD-10-CM

## 2019-03-22 DIAGNOSIS — R53.83 FATIGUE, UNSPECIFIED TYPE: ICD-10-CM

## 2019-03-22 DIAGNOSIS — E05.90 HYPERTHYROIDISM: Primary | ICD-10-CM

## 2019-03-22 DIAGNOSIS — Z00.00 ROUTINE GENERAL MEDICAL EXAMINATION AT A HEALTH CARE FACILITY: ICD-10-CM

## 2019-03-22 LAB
ALBUMIN SERPL-MCNC: 3.9 G/DL (ref 3.4–5)
ALP SERPL-CCNC: 76 U/L (ref 40–150)
ALT SERPL W P-5'-P-CCNC: 27 U/L (ref 0–50)
ANION GAP SERPL CALCULATED.3IONS-SCNC: 7 MMOL/L (ref 3–14)
AST SERPL W P-5'-P-CCNC: 24 U/L (ref 0–45)
BASOPHILS # BLD AUTO: 0.1 10E9/L (ref 0–0.2)
BASOPHILS NFR BLD AUTO: 0.9 %
BILIRUB SERPL-MCNC: 3.1 MG/DL (ref 0.2–1.3)
BUN SERPL-MCNC: 13 MG/DL (ref 7–30)
CALCIUM SERPL-MCNC: 8.9 MG/DL (ref 8.5–10.1)
CHLORIDE SERPL-SCNC: 109 MMOL/L (ref 94–109)
CHOLEST SERPL-MCNC: 115 MG/DL
CO2 SERPL-SCNC: 25 MMOL/L (ref 20–32)
CREAT SERPL-MCNC: 0.58 MG/DL (ref 0.52–1.04)
DIFFERENTIAL METHOD BLD: ABNORMAL
EOSINOPHIL # BLD AUTO: 0.3 10E9/L (ref 0–0.7)
EOSINOPHIL NFR BLD AUTO: 5.9 %
ERYTHROCYTE [DISTWIDTH] IN BLOOD BY AUTOMATED COUNT: 22.2 % (ref 10–15)
GFR SERPL CREATININE-BSD FRML MDRD: >90 ML/MIN/{1.73_M2}
GLUCOSE SERPL-MCNC: 91 MG/DL (ref 70–99)
HCT VFR BLD AUTO: 28.9 % (ref 35–47)
HDLC SERPL-MCNC: 54 MG/DL
HGB BLD-MCNC: 9.8 G/DL (ref 11.7–15.7)
LDLC SERPL CALC-MCNC: 36 MG/DL
LYMPHOCYTES # BLD AUTO: 1.7 10E9/L (ref 0.8–5.3)
LYMPHOCYTES NFR BLD AUTO: 30.2 %
MCH RBC QN AUTO: 28.5 PG (ref 26.5–33)
MCHC RBC AUTO-ENTMCNC: 33.9 G/DL (ref 31.5–36.5)
MCV RBC AUTO: 84 FL (ref 78–100)
MONOCYTES # BLD AUTO: 0.5 10E9/L (ref 0–1.3)
MONOCYTES NFR BLD AUTO: 8.6 %
NEUTROPHILS # BLD AUTO: 3 10E9/L (ref 1.6–8.3)
NEUTROPHILS NFR BLD AUTO: 54.4 %
NONHDLC SERPL-MCNC: 61 MG/DL
PLATELET # BLD AUTO: 247 10E9/L (ref 150–450)
POTASSIUM SERPL-SCNC: 4 MMOL/L (ref 3.4–5.3)
PROT SERPL-MCNC: 6.9 G/DL (ref 6.8–8.8)
RBC # BLD AUTO: 3.44 10E12/L (ref 3.8–5.2)
SODIUM SERPL-SCNC: 141 MMOL/L (ref 133–144)
T4 FREE SERPL-MCNC: 2.27 NG/DL (ref 0.76–1.46)
TRIGL SERPL-MCNC: 123 MG/DL
TSH SERPL DL<=0.005 MIU/L-ACNC: <0.01 MU/L (ref 0.4–4)
WBC # BLD AUTO: 5.5 10E9/L (ref 4–11)

## 2019-03-22 PROCEDURE — 84443 ASSAY THYROID STIM HORMONE: CPT | Performed by: INTERNAL MEDICINE

## 2019-03-22 PROCEDURE — 36415 COLL VENOUS BLD VENIPUNCTURE: CPT | Performed by: PHYSICIAN ASSISTANT

## 2019-03-22 PROCEDURE — 80061 LIPID PANEL: CPT | Performed by: INTERNAL MEDICINE

## 2019-03-22 PROCEDURE — 80053 COMPREHEN METABOLIC PANEL: CPT | Performed by: INTERNAL MEDICINE

## 2019-03-22 PROCEDURE — 87338 HPYLORI STOOL AG IA: CPT | Performed by: PHYSICIAN ASSISTANT

## 2019-03-22 PROCEDURE — 82306 VITAMIN D 25 HYDROXY: CPT | Performed by: PHYSICIAN ASSISTANT

## 2019-03-22 PROCEDURE — 85025 COMPLETE CBC W/AUTO DIFF WBC: CPT | Performed by: INTERNAL MEDICINE

## 2019-03-22 PROCEDURE — 93000 ELECTROCARDIOGRAM COMPLETE: CPT | Performed by: PHYSICIAN ASSISTANT

## 2019-03-22 PROCEDURE — 84439 ASSAY OF FREE THYROXINE: CPT | Performed by: INTERNAL MEDICINE

## 2019-03-22 PROCEDURE — 99215 OFFICE O/P EST HI 40 MIN: CPT | Performed by: PHYSICIAN ASSISTANT

## 2019-03-22 RX ORDER — ESCITALOPRAM OXALATE 10 MG/1
TABLET ORAL
Qty: 30 TABLET | Refills: 1 | Status: SHIPPED | OUTPATIENT
Start: 2019-03-22 | End: 2019-05-15

## 2019-03-22 ASSESSMENT — ANXIETY QUESTIONNAIRES
IF YOU CHECKED OFF ANY PROBLEMS ON THIS QUESTIONNAIRE, HOW DIFFICULT HAVE THESE PROBLEMS MADE IT FOR YOU TO DO YOUR WORK, TAKE CARE OF THINGS AT HOME, OR GET ALONG WITH OTHER PEOPLE: VERY DIFFICULT
1. FEELING NERVOUS, ANXIOUS, OR ON EDGE: MORE THAN HALF THE DAYS
5. BEING SO RESTLESS THAT IT IS HARD TO SIT STILL: MORE THAN HALF THE DAYS
3. WORRYING TOO MUCH ABOUT DIFFERENT THINGS: MORE THAN HALF THE DAYS
2. NOT BEING ABLE TO STOP OR CONTROL WORRYING: MORE THAN HALF THE DAYS
7. FEELING AFRAID AS IF SOMETHING AWFUL MIGHT HAPPEN: SEVERAL DAYS
GAD7 TOTAL SCORE: 12
6. BECOMING EASILY ANNOYED OR IRRITABLE: SEVERAL DAYS

## 2019-03-22 ASSESSMENT — PATIENT HEALTH QUESTIONNAIRE - PHQ9
SUM OF ALL RESPONSES TO PHQ QUESTIONS 1-9: 14
5. POOR APPETITE OR OVEREATING: MORE THAN HALF THE DAYS

## 2019-03-22 ASSESSMENT — MIFFLIN-ST. JEOR: SCORE: 1224.11

## 2019-03-22 NOTE — PATIENT INSTRUCTIONS
1. Anxiety--begin lexapro as directed. Continue to reduce stressors.  2. GERD--begin omeprazole daily. Practice anti reflux measures.  3. Return stool sample  4. Follow up in three weeks

## 2019-03-22 NOTE — PROGRESS NOTES
"  SUBJECTIVE:   Kari Christian is a 53 year old female who presents to clinic today for the following health issues:      { Abdominal and/or Flank :339770} PAIN     Onset: ***    Description:   Character: {.:099479}  Location: {.:778425}  Radiation: {.:054218::\"None\"}    Intensity: {.:043793}    Progression of Symptoms:  {.:145735}    Accompanying Signs & Symptoms:  Fever/Chills?: { :187628}  Gas/Bloating: { :219283}  Nausea: { :489969}  Vomitting: { :852932}  Diarrhea?: { :941932}  Constipation:{ :292884}  Dysuria or Hematuria: { :383438}   History:   Trauma: { :733662}  Previous similar pain: { :048305}   Previous tests done: { .:807079}    Precipitating factors:   Does the pain change with:     Food: { :875656}     BM: { :034495}    Urination: { :640651}    Alleviating factors:  ***    Therapies Tried and outcome: ***    LMP:  {NOT applicable:748561::\"not applicable\"}       {additional problems for provider to add:525896}    Problem list and histories reviewed & adjusted, as indicated.  Additional history: {NONE - AS DOCUMENTED:071976::\"as documented\"}    {HIST REVIEW/ LINKS 2:025386}    Reviewed and updated as needed this visit by clinical staff       Reviewed and updated as needed this visit by Provider         {PROVIDER CHARTING PREFERENCE:785836}  "

## 2019-03-22 NOTE — PROGRESS NOTES
"  SUBJECTIVE:   Kari Christian is a 53 year old female who presents to clinic today for the following health issues:    GERD/Heartburn  Onset: 1 week    Description:     Burning in chest: YES    Intensity: mild, moderate    Progression of Symptoms: improving    Accompanying Signs & Symptoms:  Does it feel like food gets stuck: no   Nausea: no   Vomiting (bloody?): no   Abdominal Pain: YES- upper/epigastric abdominal pain  Constant pain   Triggers: feels raw and \"acidy\"  Black-Tarry stools: no:  Bloody stools: no  Diarrhea daily with IBS  Clearing throat    Fatigue present    Endoscopy one year ago    History:   Previous ulcers: no    Precipitating factors:   Caffeine use: YES- 16 oz cup per day  Alcohol use: no  NSAID/Aspirin use: no  Tobacco use: YES  Worse with unsure.    Alleviating factors:  jessy  Therapies Tried and outcome:liquid antacid and tumms    No recent cold. History of asthma and albuterol use without relief in past. No use recently.     Increased stress as of late. Feels panicky and like she will hyperventilate. Chest tightness. No medication. Depression in family.     Fatigued. History of spherocytosis.      No history of HTN, HLD, diabetes, CVA, CAD, MI.     SH: history of tobacco use.    FH: father with esophageal cancer and CAD     ROS:  CONSTITUTIONAL: POSITIVE for fatigue  ENT/MOUTH: POSITIVE for lump in throat and PND  RESP: NEGATIVE for significant cough or SOB  CV: POSITIVE for chest tightness  GI: POSITIVE for expigastric pain, nausea and diarrhea  MUSCULOSKELETAL: NEGATIVE for significant arthralgias or myalgia  NEURO: POSITIVE for paresthesias of the UE bilaterally  Otherwise a 10 point ROS is NEGATIVE except as stated above.    OBJECTIVE:                                                    /64 (BP Location: Right arm, Patient Position: Sitting, Cuff Size: Adult Regular)   Pulse 80   Temp 98.3  F (36.8  C) (Tympanic)   Ht 1.676 m (5' 6\")   Wt 61.2 kg (135 lb)   LMP 04/28/2009   " SpO2 96%   BMI 21.79 kg/m    Body mass index is 21.79 kg/m .   GENERAL: alert, anxious, no distress  Eyes:  Eyes grossly normal to inspection, PERRL and conjunctivae and sclerae normal  HENT: ear canals- normal; TMs- normal; Nose- normal; Mouth- no ulcers, no lesions  NECK: tonsillar LAD  RESP: lungs clear to auscultation - no rales, no rhonchi, no wheezes  CV: regular rates and rhythm, normal S1 S2, no S3 or S4 and no murmur, no click or rub  ABDOMEN: soft, epigastric tenderness, no  hepatosplenomegaly, no masses, normal bowel sounds  Psych:  anxious and fatigued    Diagnostic test results:   EKG completed and unchanged from previous EKG  Results for orders placed or performed in visit on 03/22/19 (from the past 24 hour(s))   CBC with platelets differential   Result Value Ref Range    WBC 5.5 4.0 - 11.0 10e9/L    RBC Count 3.44 (L) 3.8 - 5.2 10e12/L    Hemoglobin 9.8 (L) 11.7 - 15.7 g/dL    Hematocrit 28.9 (L) 35.0 - 47.0 %    MCV 84 78 - 100 fl    MCH 28.5 26.5 - 33.0 pg    MCHC 33.9 31.5 - 36.5 g/dL    RDW 22.2 (H) 10.0 - 15.0 %    Platelet Count 247 150 - 450 10e9/L    % Neutrophils 54.4 %    % Lymphocytes 30.2 %    % Monocytes 8.6 %    % Eosinophils 5.9 %    % Basophils 0.9 %    Absolute Neutrophil 3.0 1.6 - 8.3 10e9/L    Absolute Lymphocytes 1.7 0.8 - 5.3 10e9/L    Absolute Monocytes 0.5 0.0 - 1.3 10e9/L    Absolute Eosinophils 0.3 0.0 - 0.7 10e9/L    Absolute Basophils 0.1 0.0 - 0.2 10e9/L    Diff Method Automated Method         ASSESSMENT/PLAN:                                                    (F41.1) AUDREY (generalized anxiety disorder)  (primary encounter diagnosis)  Comment: accompanied by panic, chest tightness, fullness of throat, diarrhea. Begin lexapro daily as directed. Close follow up in one month.  Plan: escitalopram (LEXAPRO) 10 MG tablet          (K21.9) Gastroesophageal reflux disease without esophagitis  Comment: begin PPI. Discussed anti-reflux measures.   Plan: omeprazole (PRILOSEC) 20 MG   capsule         (R10.13) Epigastric pain  Comment: obtain labs and stool culture. Continue to monitor.  Plan: H Pylori antigen, stool, omeprazole (PRILOSEC)         20 MG DR capsule          (K58.0) Irritable bowel syndrome with diarrhea  Comment: ideally, anti anxiety meds will help with this.  Plan:     (R53.83) Fatigue, unspecified type  Comment: labs pending. CBC, CMP, TSH also obtained.  Plan: Vitamin D Deficiency          (R07.89) Chest tightness  Comment: likely due to anxiety.  Plan: EKG 12-lead complete w/read - Clinics          Greater than 50 minutes was spent with patient today with the majority spent on counseling and coordination of care for the condition of the above conditions.     Alea Stinson PA-C  [facility]

## 2019-03-23 LAB — DEPRECATED CALCIDIOL+CALCIFEROL SERPL-MC: 22 UG/L (ref 20–75)

## 2019-03-23 ASSESSMENT — ANXIETY QUESTIONNAIRES: GAD7 TOTAL SCORE: 12

## 2019-03-25 LAB
H PYLORI AG STL QL IA: NORMAL
SPECIMEN SOURCE: NORMAL

## 2019-03-25 NOTE — TELEPHONE ENCOUNTER
Endo staff- can you please take a look into this?  Pt needs to be seen sooner  Has opening 3/26/2019 at [facility] at 900 AM  Please see if pt can come.    Thank you.

## 2019-03-25 NOTE — TELEPHONE ENCOUNTER
Patient calls back unable to make it to appointments tomorrow, did make one for next available, will leave her name on desk for Janeth MCINTYRE to check for any openings as soon as they are able. She needs a Tuesday afternoon appointment.

## 2019-03-26 ENCOUNTER — TELEPHONE (OUTPATIENT)
Dept: ENDOCRINOLOGY | Facility: CLINIC | Age: 54
End: 2019-03-26

## 2019-03-26 NOTE — TELEPHONE ENCOUNTER
Patient needs to be seen sooner  Please call patient and see few time slots that are suitable for her schedule.  I will try to accommodate her.

## 2019-03-26 NOTE — TELEPHONE ENCOUNTER
Returned pt called- left another message- short noticed  Same day cancellation was available; the time slot has now passed-  Pt will remain on wait List ( by Janeth Christina MA )     //Litzy Herbert MA// March 26, 2019 2:40 PM

## 2019-03-26 NOTE — RESULT ENCOUNTER NOTE
The pt is aware and scheduled for her upcoming appointment.   Tabitha Rai on 3/26/2019 at 3:34 PM

## 2019-03-26 NOTE — TELEPHONE ENCOUNTER
Cancellation today at 2:30 pm left message asking if this time would work today.    //Litzy Herbert MA// March 26, 2019 1:27 PM

## 2019-03-26 NOTE — TELEPHONE ENCOUNTER
Reason for call:  Other   Patient called regarding (reason for call): call back  Additional comments: Patient wants to talk to Dr. Gutiérrez's nurse regarding some questions she has. Please call her back.     Phone number to reach patient:  Cell number on file:    Telephone Information:   Mobile [phone]       Best Time:  ASAP    Can we leave a detailed message on this number?  YES

## 2019-03-27 ENCOUNTER — TELEPHONE (OUTPATIENT)
Dept: ENDOCRINOLOGY | Facility: CLINIC | Age: 54
End: 2019-03-27

## 2019-03-27 NOTE — TELEPHONE ENCOUNTER
Reason for call:  Other   Patient called regarding (reason for call): call back  Additional comments: Patient has appt in May with Dr. Gutiérrez. She was seen 3/22/19 by Ana Rosa BARAJAS. She was given medication for her acid reflux and anxiety. She feels better now and is wondering if she still needs to be seen for hyperthyroidism by Dr. Gutiérrez. Please call her back and you have her permission to leave a detailed  message on her cell phone.    Phone number to reach patient:  Cell number on file:    Telephone Information:   Mobile [phone]       Best Time:  ASAP    Can we leave a detailed message on this number?  YES

## 2019-03-28 NOTE — TELEPHONE ENCOUNTER
ENDO THYROID LABS-[facility] Latest Ref Rng & Units 3/22/2019   TSH 0.40 - 4.00 mU/L <0.01 (L)   T4 FREE 0.76 - 1.46 ng/dL 2.27 (H)   I am glad that she is feeling better but labs are consistent with hyperthyroidism.  I would recommend endocrinology consult.

## 2019-04-30 ENCOUNTER — HOSPITAL ENCOUNTER (EMERGENCY)
Facility: CLINIC | Age: 54
Discharge: HOME | End: 2019-04-30
Attending: EMERGENCY MEDICINE | Admitting: EMERGENCY MEDICINE
Payer: COMMERCIAL

## 2019-04-30 ENCOUNTER — OFFICE VISIT (OUTPATIENT)
Dept: PEDIATRICS | Facility: CLINIC | Age: 54
End: 2019-04-30
Payer: COMMERCIAL

## 2019-04-30 ENCOUNTER — APPOINTMENT (OUTPATIENT)
Dept: CT IMAGING | Facility: CLINIC | Age: 54
End: 2019-04-30
Attending: EMERGENCY MEDICINE
Payer: COMMERCIAL

## 2019-04-30 ENCOUNTER — OFFICE VISIT (OUTPATIENT)
Dept: ENDOCRINOLOGY | Facility: CLINIC | Age: 54
End: 2019-04-30
Payer: COMMERCIAL

## 2019-04-30 VITALS
BODY MASS INDEX: 22.45 KG/M2 | SYSTOLIC BLOOD PRESSURE: 108 MMHG | TEMPERATURE: 97.9 F | HEART RATE: 98 BPM | WEIGHT: 139.7 LBS | OXYGEN SATURATION: 99 % | HEIGHT: 66 IN | DIASTOLIC BLOOD PRESSURE: 60 MMHG

## 2019-04-30 VITALS
WEIGHT: 140 LBS | OXYGEN SATURATION: 99 % | SYSTOLIC BLOOD PRESSURE: 108 MMHG | BODY MASS INDEX: 22.5 KG/M2 | DIASTOLIC BLOOD PRESSURE: 60 MMHG | TEMPERATURE: 97.9 F | HEART RATE: 82 BPM | HEIGHT: 66 IN

## 2019-04-30 VITALS
RESPIRATION RATE: 20 BRPM | DIASTOLIC BLOOD PRESSURE: 70 MMHG | OXYGEN SATURATION: 98 % | HEART RATE: 97 BPM | SYSTOLIC BLOOD PRESSURE: 114 MMHG | TEMPERATURE: 97.8 F

## 2019-04-30 DIAGNOSIS — H65.93 MIDDLE EAR EFFUSION, BILATERAL: ICD-10-CM

## 2019-04-30 DIAGNOSIS — R42 VERTIGO: Primary | ICD-10-CM

## 2019-04-30 DIAGNOSIS — F41.1 GAD (GENERALIZED ANXIETY DISORDER): ICD-10-CM

## 2019-04-30 DIAGNOSIS — K21.00 GASTROESOPHAGEAL REFLUX DISEASE WITH ESOPHAGITIS: ICD-10-CM

## 2019-04-30 DIAGNOSIS — R42 VERTIGO: ICD-10-CM

## 2019-04-30 DIAGNOSIS — E05.90 HYPERTHYROIDISM: Primary | ICD-10-CM

## 2019-04-30 DIAGNOSIS — H83.02 ACUTE LABYRINTHITIS, LEFT: ICD-10-CM

## 2019-04-30 DIAGNOSIS — E05.90 HYPERTHYROIDISM: ICD-10-CM

## 2019-04-30 DIAGNOSIS — R11.0 NAUSEA: ICD-10-CM

## 2019-04-30 LAB
ALBUMIN SERPL-MCNC: 3.7 G/DL (ref 3.4–5)
ALP SERPL-CCNC: 83 U/L (ref 40–150)
ALT SERPL W P-5'-P-CCNC: 21 U/L (ref 0–50)
ANION GAP SERPL CALCULATED.3IONS-SCNC: 9 MMOL/L (ref 3–14)
AST SERPL W P-5'-P-CCNC: 16 U/L (ref 0–45)
BASOPHILS # BLD AUTO: 0 10E9/L (ref 0–0.2)
BASOPHILS NFR BLD AUTO: 0.7 %
BILIRUB SERPL-MCNC: 4.1 MG/DL (ref 0.2–1.3)
BUN SERPL-MCNC: 14 MG/DL (ref 7–30)
CALCIUM SERPL-MCNC: 8.5 MG/DL (ref 8.5–10.1)
CHLORIDE SERPL-SCNC: 106 MMOL/L (ref 94–109)
CO2 SERPL-SCNC: 24 MMOL/L (ref 20–32)
CREAT SERPL-MCNC: 0.51 MG/DL (ref 0.52–1.04)
DIFFERENTIAL METHOD BLD: ABNORMAL
EOSINOPHIL # BLD AUTO: 0 10E9/L (ref 0–0.7)
EOSINOPHIL NFR BLD AUTO: 0.5 %
ERYTHROCYTE [DISTWIDTH] IN BLOOD BY AUTOMATED COUNT: 21.2 % (ref 10–15)
GFR SERPL CREATININE-BSD FRML MDRD: >90 ML/MIN/{1.73_M2}
GLUCOSE SERPL-MCNC: 142 MG/DL (ref 70–99)
HCT VFR BLD AUTO: 26.2 % (ref 35–47)
HGB BLD-MCNC: 9 G/DL (ref 11.7–15.7)
IMM GRANULOCYTES # BLD: 0.1 10E9/L (ref 0–0.4)
IMM GRANULOCYTES NFR BLD: 0.8 %
LYMPHOCYTES # BLD AUTO: 0.7 10E9/L (ref 0.8–5.3)
LYMPHOCYTES NFR BLD AUTO: 11.8 %
MAGNESIUM SERPL-MCNC: 1.8 MG/DL (ref 1.6–2.3)
MCH RBC QN AUTO: 28.8 PG (ref 26.5–33)
MCHC RBC AUTO-ENTMCNC: 34.4 G/DL (ref 31.5–36.5)
MCV RBC AUTO: 84 FL (ref 78–100)
MONOCYTES # BLD AUTO: 0.3 10E9/L (ref 0–1.3)
MONOCYTES NFR BLD AUTO: 4.4 %
NEUTROPHILS # BLD AUTO: 4.8 10E9/L (ref 1.6–8.3)
NEUTROPHILS NFR BLD AUTO: 81.8 %
NRBC # BLD AUTO: 0 10*3/UL
NRBC BLD AUTO-RTO: 0 /100
PLATELET # BLD AUTO: 217 10E9/L (ref 150–450)
POTASSIUM SERPL-SCNC: 3.8 MMOL/L (ref 3.4–5.3)
PROT SERPL-MCNC: 7.1 G/DL (ref 6.8–8.8)
RBC # BLD AUTO: 3.13 10E12/L (ref 3.8–5.2)
SODIUM SERPL-SCNC: 139 MMOL/L (ref 133–144)
T4 FREE SERPL-MCNC: 2.78 NG/DL (ref 0.76–1.46)
TSH SERPL DL<=0.005 MIU/L-ACNC: <0.01 MU/L (ref 0.4–4)
WBC # BLD AUTO: 5.9 10E9/L (ref 4–11)

## 2019-04-30 PROCEDURE — 25000128 H RX IP 250 OP 636: Performed by: EMERGENCY MEDICINE

## 2019-04-30 PROCEDURE — 99244 OFF/OP CNSLTJ NEW/EST MOD 40: CPT | Mod: 25 | Performed by: INTERNAL MEDICINE

## 2019-04-30 PROCEDURE — 99215 OFFICE O/P EST HI 40 MIN: CPT | Performed by: INTERNAL MEDICINE

## 2019-04-30 PROCEDURE — 96374 THER/PROPH/DIAG INJ IV PUSH: CPT | Mod: 59

## 2019-04-30 PROCEDURE — 25000131 ZZH RX MED GY IP 250 OP 636 PS 637: Performed by: EMERGENCY MEDICINE

## 2019-04-30 PROCEDURE — 25000132 ZZH RX MED GY IP 250 OP 250 PS 637: Performed by: EMERGENCY MEDICINE

## 2019-04-30 PROCEDURE — 70498 CT ANGIOGRAPHY NECK: CPT

## 2019-04-30 PROCEDURE — 99285 EMERGENCY DEPT VISIT HI MDM: CPT | Mod: 25

## 2019-04-30 PROCEDURE — 70450 CT HEAD/BRAIN W/O DYE: CPT

## 2019-04-30 PROCEDURE — 83735 ASSAY OF MAGNESIUM: CPT | Performed by: EMERGENCY MEDICINE

## 2019-04-30 PROCEDURE — 85025 COMPLETE CBC W/AUTO DIFF WBC: CPT | Performed by: EMERGENCY MEDICINE

## 2019-04-30 PROCEDURE — 84439 ASSAY OF FREE THYROXINE: CPT | Performed by: EMERGENCY MEDICINE

## 2019-04-30 PROCEDURE — 84443 ASSAY THYROID STIM HORMONE: CPT | Performed by: EMERGENCY MEDICINE

## 2019-04-30 PROCEDURE — 93005 ELECTROCARDIOGRAM TRACING: CPT

## 2019-04-30 PROCEDURE — 80053 COMPREHEN METABOLIC PANEL: CPT | Performed by: EMERGENCY MEDICINE

## 2019-04-30 PROCEDURE — 96361 HYDRATE IV INFUSION ADD-ON: CPT

## 2019-04-30 RX ORDER — ONDANSETRON 4 MG/1
4 TABLET, ORALLY DISINTEGRATING ORAL EVERY 8 HOURS PRN
Qty: 20 TABLET | Refills: 0 | Status: SHIPPED | OUTPATIENT
Start: 2019-04-30 | End: 2019-07-23

## 2019-04-30 RX ORDER — IOPAMIDOL 755 MG/ML
500 INJECTION, SOLUTION INTRAVASCULAR ONCE
Status: COMPLETED | OUTPATIENT
Start: 2019-04-30 | End: 2019-04-30

## 2019-04-30 RX ORDER — PREDNISONE 10 MG/1
TABLET ORAL
Qty: 35 TABLET | Refills: 0 | Status: SHIPPED | OUTPATIENT
Start: 2019-04-30 | End: 2019-07-23

## 2019-04-30 RX ORDER — PREDNISONE 20 MG/1
60 TABLET ORAL ONCE
Status: COMPLETED | OUTPATIENT
Start: 2019-04-30 | End: 2019-04-30

## 2019-04-30 RX ORDER — ONDANSETRON 4 MG/1
4 TABLET, ORALLY DISINTEGRATING ORAL ONCE
Status: COMPLETED | OUTPATIENT
Start: 2019-04-30 | End: 2019-04-30

## 2019-04-30 RX ORDER — MECLIZINE HYDROCHLORIDE 25 MG/1
25 TABLET ORAL 3 TIMES DAILY PRN
Qty: 21 TABLET | Refills: 0 | Status: SHIPPED | OUTPATIENT
Start: 2019-04-30 | End: 2019-04-30

## 2019-04-30 RX ORDER — MECLIZINE HYDROCHLORIDE 25 MG/1
25-50 TABLET ORAL EVERY 6 HOURS PRN
Qty: 30 TABLET | Refills: 0 | Status: SHIPPED | OUTPATIENT
Start: 2019-04-30 | End: 2019-07-23

## 2019-04-30 RX ORDER — MECLIZINE HYDROCHLORIDE 25 MG/1
25 TABLET ORAL ONCE
Status: COMPLETED | OUTPATIENT
Start: 2019-04-30 | End: 2019-04-30

## 2019-04-30 RX ORDER — LORAZEPAM 2 MG/ML
0.5 INJECTION INTRAMUSCULAR ONCE
Status: COMPLETED | OUTPATIENT
Start: 2019-04-30 | End: 2019-04-30

## 2019-04-30 RX ORDER — SODIUM CHLORIDE 9 MG/ML
1000 INJECTION, SOLUTION INTRAVENOUS CONTINUOUS
Status: DISCONTINUED | OUTPATIENT
Start: 2019-04-30 | End: 2019-04-30 | Stop reason: HOSPADM

## 2019-04-30 RX ADMIN — LORAZEPAM 0.5 MG: 2 INJECTION INTRAMUSCULAR; INTRAVENOUS at 15:52

## 2019-04-30 RX ADMIN — PREDNISONE 60 MG: 20 TABLET ORAL at 18:16

## 2019-04-30 RX ADMIN — SODIUM CHLORIDE 1000 ML: 9 INJECTION, SOLUTION INTRAVENOUS at 15:53

## 2019-04-30 RX ADMIN — MECLIZINE HYDROCHLORIDE 25 MG: 25 TABLET ORAL at 18:47

## 2019-04-30 RX ADMIN — IOPAMIDOL 70 ML: 755 INJECTION, SOLUTION INTRAVENOUS at 16:52

## 2019-04-30 RX ADMIN — ONDANSETRON 4 MG: 4 TABLET, ORALLY DISINTEGRATING ORAL at 12:19

## 2019-04-30 RX ADMIN — MECLIZINE HYDROCHLORIDE 25 MG: 25 TABLET ORAL at 16:35

## 2019-04-30 RX ADMIN — SODIUM CHLORIDE 80 ML: 9 INJECTION, SOLUTION INTRAVENOUS at 16:52

## 2019-04-30 ASSESSMENT — ENCOUNTER SYMPTOMS
DIZZINESS: 1
HEADACHES: 1
NAUSEA: 1

## 2019-04-30 ASSESSMENT — MIFFLIN-ST. JEOR
SCORE: 1245.43
SCORE: 1246.79

## 2019-04-30 NOTE — Clinical Note
I am not sure what to bill. Consultation for hyperthyroidism but was extremely sick in clinic and recommended to go to ER. Lot of time spent in co-ordinating care. No PE done.I putting c4 but change it based on guidelines.Let me know if you have any questions.Iraida Gutiérrez

## 2019-04-30 NOTE — PATIENT INSTRUCTIONS
[facility]   Dr Gutiérrez, Endocrinology Department      [facility]   [address]  [place] [address]  Appointment [phone]  Fax: [phone]  [place]: Monday and Tuesday         [place] [address]  [place] [address]  Appointment [phone]  Fax: [phone]  [place]: Wednesday and Thursday

## 2019-04-30 NOTE — LETTER
My Depression Action Plan  Name: Kari Christian   Date of Birth 1965  Date: 4/30/2019    My doctor: Brenna Morse   My clinic: [address]  [address]  [facility] [address] [phone]          GREEN    ZONE   Good Control    What it looks like:     Things are going generally well. You have normal up s and down s. You may even feel depressed from time to time, but bad moods usually last less than a day.   What you need to do:  1. Continue to care for yourself (see self care plan)  2. Check your depression survival kit and update it as needed  3. Follow your physician s recommendations including any medication.  4. Do not stop taking medication unless you consult with your physician first.           YELLOW         ZONE Getting Worse    What it looks like:     Depression is starting to interfere with your life.     It may be hard to get out of bed; you may be starting to isolate yourself from others.    Symptoms of depression are starting to last most all day and this has happened for several days.     You may have suicidal thoughts but they are not constant.   What you need to do:     1. Call your care team, your response to treatment will improve if you keep your care team informed of your progress. Yellow periods are signs an adjustment may need to be made.     2. Continue your self-care, even if you have to fake it!    3. Talk to someone in your support network    4. Open up your depression survival kit           RED    ZONE Medical Alert - Get Help    What it looks like:     Depression is seriously interfering with your life.     You may experience these or other symptoms: You can t get out of bed most days, can t work or engage in other necessary activities, you have trouble taking care of basic hygiene, or basic responsibilities, thoughts of suicide or death that will not go away, self-injurious behavior.     What you need to do:  1. Call your  care team and request a same-day appointment. If they are not available (weekends or after hours) call your local crisis line, emergency room or 911.            Depression Self Care Plan / Survival Kit    Self-Care for Depression  Here s the deal. Your body and mind are really not as separate as most people think.  What you do and think affects how you feel and how you feel influences what you do and think. This means if you do things that people who feel good do, it will help you feel better.  Sometimes this is all it takes.  There is also a place for medication and therapy depending on how severe your depression is, so be sure to consult with your medical provider and/ or Behavioral Health Consultant if your symptoms are worsening or not improving.     In order to better manage my stress, I will:    Exercise  Get some form of exercise, every day. This will help reduce pain and release endorphins, the  feel good  chemicals in your brain. This is almost as good as taking antidepressants!  This is not the same as joining a gym and then never going! (they count on that by the way ) It can be as simple as just going for a walk or doing some gardening, anything that will get you moving.      Hygiene   Maintain good hygiene (Get out of bed in the morning, Make your bed, Brush your teeth, Take a shower, and Get dressed like you were going to work, even if you are unemployed).  If your clothes don't fit try to get ones that do.    Diet  I will strive to eat foods that are good for me, drink plenty of water, and avoid excessive sugar, caffeine, alcohol, and other mood-altering substances.  Some foods that are helpful in depression are: complex carbohydrates, B vitamins, flaxseed, fish or fish oil, fresh fruits and vegetables.    Psychotherapy  I agree to participate in Individual Therapy (if recommended).    Medication  If prescribed medications, I agree to take them.  Missing doses can result in serious side effects.  I  understand that drinking alcohol, or other illicit drug use, may cause potential side effects.  I will not stop my medication abruptly without first discussing it with my provider.    Staying Connected With Others  I will stay in touch with my friends, family members, and my primary care provider/team.    Use your imagination  Be creative.  We all have a creative side; it doesn t matter if it s oil painting, sand castles, or mud pies! This will also kick up the endorphins.    Witness Beauty  (AKA stop and smell the roses) Take a look outside, even in mid-winter. Notice colors, textures. Watch the squirrels and birds.     Service to others  Be of service to others.  There is always someone else in need.  By helping others we can  get out of ourselves  and remember the really important things.  This also provides opportunities for practicing all the other parts of the program.    Humor  Laugh and be silly!  Adjust your TV habits for less news and crime-drama and more comedy.    Control your stress  Try breathing deep, massage therapy, biofeedback, and meditation. Find time to relax each day.     My support system    Clinic Contact:  Phone number:    Contact 1:  Phone number:    Contact 2:  Phone number:    [place]:  Phone number:    Therapist:  Phone number:    Local crisis center:    Phone number:    Other community support:  Phone number:

## 2019-04-30 NOTE — PROGRESS NOTES
Name: Kari Christian  Seen in consultation with Brenna Morse for Hyperthyroidism.    HPI:  Kari Christian is a 53 year old female who presents for the evaluation of Hyperthyroidism.    Had dizziness on and off  Ears are plugged X 2 days  Feeling of vertigo and nauseated.  Was seen by  and was started on meclizine.    Feeling fatigued.  Has on and off HA.    History of radiation exposure: NO  History of thyroid dysfunction: h/o subclinical hyperthyroidism-- was not on medication  Palpitations:  On and off  Changes to hair or skin: No  Diarrhea/Constipation:h/o IBS  Not able to get further history from patient as she was feeling sick and nauseated.    PMH/PSH:  Past Medical History:   Diagnosis Date     Anemia, unspecified     normal is 7-10     Hereditary spherocytosis (H)     no past transfusion;      Major depressive disorder, single episode, mild (H)      Thyroid disease      Past Surgical History:   Procedure Laterality Date     CHOLECYSTECTOMY  2007     COLONOSCOPY  09/2014     COLONOSCOPY  01/15/2018    Dr. Wade [facility]     ESOPHAGOSCOPY, GASTROSCOPY, DUODENOSCOPY (EGD), COMBINED N/A 1/15/2018    Procedure: COMBINED ESOPHAGOSCOPY, GASTROSCOPY, DUODENOSCOPY (EGD), BIOPSY SINGLE OR MULTIPLE;  ESOPHAGOSCOPY, GASTROSCOPY, DUODENOSCOPY (EGD) with cold biopsies of duodenum and  COLONOSCOPY with polypectomy;  Surgeon: Chad Wade MD;  Location:  GI     HEAD & NECK SURGERY      wisdom teeth removed     Family Hx:  Family History   Problem Relation Age of Onset     Hypertension Mother      Arthritis Mother      Respiratory Mother         Emphysema     Gastrointestinal Disease Mother      Circulatory Mother      C.A.D. Father 59     Hypertension Father      Cancer Father         Esophageal     Gastrointestinal Disease Father         Gallstones, Gallbladder removal, Colostomy, diverticulitis     Cancer Maternal Grandfather         Kidney     Cancer Paternal Grandfather         Lung, smoker      Breast Cancer Sister      Thyroid Disease Sister      Cancer - colorectal No family hx of      Colon Cancer No family hx of              Social Hx:  Social History     Socioeconomic History     Marital status:      Spouse name: Not on file     Number of children: Not on file     Years of education: Not on file     Highest education level: Not on file   Occupational History     Not on file   Social Needs     Financial resource strain: Not on file     Food insecurity:     Worry: Not on file     Inability: Not on file     Transportation needs:     Medical: Not on file     Non-medical: Not on file   Tobacco Use     Smoking status: Current Every Day Smoker     Packs/day: 0.30     Years: 20.00     Pack years: 6.00     Types: Cigarettes     Smokeless tobacco: Never Used   Substance and Sexual Activity     Alcohol use: Yes     Alcohol/week: 1.2 - 1.8 oz     Types: 2 - 3 Standard drinks or equivalent per week     Comment: socially     Drug use: No     Sexual activity: Yes     Partners: Male     Comment: none   Lifestyle     Physical activity:     Days per week: Not on file     Minutes per session: Not on file     Stress: Not on file   Relationships     Social connections:     Talks on phone: Not on file     Gets together: Not on file     Attends [employer] service: Not on file     Active member of club or organization: Not on file     Attends meetings of clubs or organizations: Not on file     Relationship status: Not on file     Intimate partner violence:     Fear of current or ex partner: Not on file     Emotionally abused: Not on file     Physically abused: Not on file     Forced sexual activity: Not on file   Other Topics Concern     Parent/sibling w/ CABG, MI or angioplasty before 65F 55M? Not Asked   Social History Narrative     Not on file          MEDICATIONS:  has a current medication list which includes the following prescription(s): albuterol, escitalopram, folic acid, ibuprofen, meclizine, and  "omeprazole.    ROS   ROS: 10 point ROS neg other than the symptoms noted above in the HPI.    Physical Exam   VS: /60   Pulse 82   Temp 97.9  F (36.6  C) (Oral)   Ht 1.676 m (5' 6\")   Wt 63.5 kg (140 lb)   LMP 04/28/2009   SpO2 99%   BMI 22.60 kg/m      LABS:  TFTs:  ENDO THYROID LABS-[facility] Latest Ref Rng & Units 3/22/2019 8/29/2017   TSH 0.40 - 4.00 mU/L <0.01 (L) 0.33 (L)   T4 FREE 0.76 - 1.46 ng/dL 2.27 (H) 1.25       CBC:  Lab Results   Component Value Date    WBC 5.5 03/22/2019     Lab Results   Component Value Date    RBC 3.44 03/22/2019     Lab Results   Component Value Date    HGB 9.8 03/22/2019     Lab Results   Component Value Date    HCT 28.9 03/22/2019     No components found for: MCT  Lab Results   Component Value Date    MCV 84 03/22/2019     Lab Results   Component Value Date    MCH 28.5 03/22/2019     Lab Results   Component Value Date    MCHC 33.9 03/22/2019     Lab Results   Component Value Date    RDW 22.2 03/22/2019     Lab Results   Component Value Date     03/22/2019         LFTs:  Liver Function Studies -   Recent Labs   Lab Test 03/22/19  0942   PROTTOTAL 6.9   ALBUMIN 3.9   BILITOTAL 3.1*   ALKPHOS 76   AST 24   ALT 27         All pertinent notes, labs, and images personally reviewed by me.     A/P  Ms.Sarah ROWDY Christian is a 53 year old here for the evaluation of hyperthyroidism.    Hyperthyroidism:  Differential for hyperthyroidism includes:  Graves' disease, thyroiditis, or autonomous hyperfunctioning nodule.  An uptake and scan of her thyroid gland will help differentiate the diagnosis.  In Graves' disease her uptake will be homogeneous and increased, in thyroiditis her uptake will be low, and in an autonomous hyperfunctioing nodule the uptake will be increased in a focal area.    History of long-standing subclinical hyperthyroidism.  Was not on medication in past  Labs from March 2019 consistent with hyperthyroidism with suppressed TSH and high free T4  Plan was to get " repeat labs along with screening for Graves' disease but as noted below patient is not feeling well  No fever, tachycardia, diarrhea.   Presentation does not appear like thyroid storm.    But recommend to get thyroid labs rechecked at emergency department.      Intractable nausea:  Patient was earlier seen by .  She was prescribed meclizine but she has not picked up the prescription yet  Patient is feeling extremely nauseous and dizzy.  Intractable nausea and vertigo  She is unsteady  I recommend that she go to the emergency department for further assessment and management of intractable nausea and vertigo.  I recommend to get thyroid labs checked at emergency department    Addendum  Patient was feeling sick and was agreeable to go to emergency department  EMS was called and patient was transferred to emergency department in stable condition.      Follow-up:  In 2-4 weeks      Iraida Gutiérrez MD  Endocrinology  [facility]  CC: Brenna Morse     More than 50% of face to face time spent with Ms. Christian on counseling / coordinating her care.    I spoke with  and EMS staff personally and discussed care.    All questions were answered.  The patient indicates understanding of the above issues and agrees with the plan set forth.

## 2019-04-30 NOTE — PATIENT INSTRUCTIONS
Vertigo with nausea:    -- I do want you to be evaluated by endocrinology today, as this may be related to hyperthyroidism.  -- Other causes of dizziness include benign positional vertigo, vertebral migraine, or vertigo related to inner ear problems (middle ear effusion).  The good news is, these are usually self-limited.  I prescribed meclizine as needed for vertigo, however I'd like you to follow-up with your PCP in about a week if no improvement.  -- Another cause may be a side effect of starting and abruptly stopping your depression medication.  This too should pass on it's own.  -- Nausea may also be due to poorly controlled reflux - make sure to take your antacid daily.

## 2019-04-30 NOTE — LETTER
4/30/2019         RE: Kari Christian  [address] [place] [address]        Dear Colleague,    [facility] you [facility] your patient[facility] Please see a copy of my visit note below.    Name: Kari Christian  Seen in consultation with Brenna Mosre for Hyperthyroidism.    HPI:  Kari Christian is a 53 year old female who presents for the evaluation of Hyperthyroidism.    Had dizziness on and off  Ears are plugged X 2 days  Feeling of vertigo and nauseated.  Was seen by  and was started on meclizine.    Feeling fatigued.  Has on and off HA.    History of radiation exposure: NO  History of thyroid dysfunction: h/o subclinical hyperthyroidism-- was not on medication  Palpitations:  On and off  Changes to hair or skin: No  Diarrhea/Constipation:h/o IBS  Not able to get further history from patient as she was feeling sick and nauseated.    PMH/PSH:  Past Medical History:   Diagnosis Date     Anemia, unspecified     normal is 7-10     Hereditary spherocytosis (H)     no past transfusion;      Major depressive disorder, single episode, mild (H)      Thyroid disease      Past Surgical History:   Procedure Laterality Date     CHOLECYSTECTOMY  2007     COLONOSCOPY  09/2014     COLONOSCOPY  01/15/2018    Dr. Wade [facility]     ESOPHAGOSCOPY, GASTROSCOPY, DUODENOSCOPY (EGD), COMBINED N/A 1/15/2018    Procedure: COMBINED ESOPHAGOSCOPY, GASTROSCOPY, DUODENOSCOPY (EGD), BIOPSY SINGLE OR MULTIPLE;  ESOPHAGOSCOPY, GASTROSCOPY, DUODENOSCOPY (EGD) with cold biopsies of duodenum and  COLONOSCOPY with polypectomy;  Surgeon: Chad Wade MD;  Location:  GI     HEAD & NECK SURGERY      wisdom teeth removed     Family Hx:  Family History   Problem Relation Age of Onset     Hypertension Mother      Arthritis Mother      Respiratory Mother         Emphysema     Gastrointestinal Disease Mother      Circulatory Mother      C.A.D. Father 59     Hypertension Father      Cancer  Father         Esophageal     Gastrointestinal Disease Father         Gallstones, Gallbladder removal, Colostomy, diverticulitis     Cancer Maternal Grandfather         Kidney     Cancer Paternal Grandfather         Lung, smoker     Breast Cancer Sister      Thyroid Disease Sister      Cancer - colorectal No family hx of      Colon Cancer No family hx of              Social Hx:  Social History     Socioeconomic History     Marital status:      Spouse name: Not on file     Number of children: Not on file     Years of education: Not on file     Highest education level: Not on file   Occupational History     Not on file   Social Needs     Financial resource strain: Not on file     Food insecurity:     Worry: Not on file     Inability: Not on file     Transportation needs:     Medical: Not on file     Non-medical: Not on file   Tobacco Use     Smoking status: Current Every Day Smoker     Packs/day: 0.30     Years: 20.00     Pack years: 6.00     Types: Cigarettes     Smokeless tobacco: Never Used   Substance and Sexual Activity     Alcohol use: Yes     Alcohol/week: 1.2 - 1.8 oz     Types: 2 - 3 Standard drinks or equivalent per week     Comment: socially     Drug use: No     Sexual activity: Yes     Partners: Male     Comment: none   Lifestyle     Physical activity:     Days per week: Not on file     Minutes per session: Not on file     Stress: Not on file   Relationships     Social connections:     Talks on phone: Not on file     Gets together: Not on file     Attends Cheondo[employer] service: Not on file     Active member of club or organization: Not on file     Attends meetings of clubs or organizations: Not on file     Relationship status: Not on file     Intimate partner violence:     Fear of current or ex partner: Not on file     Emotionally abused: Not on file     Physically abused: Not on file     Forced sexual activity: Not on file   Other Topics Concern     Parent/sibling w/ CABG, MI or angioplasty before 65F  "55M? Not Asked   Social History Narrative     Not on file          MEDICATIONS:  has a current medication list which includes the following prescription(s): albuterol, escitalopram, folic acid, ibuprofen, meclizine, and omeprazole.    ROS   ROS: 10 point ROS neg other than the symptoms noted above in the HPI.    Physical Exam   VS: /60   Pulse 82   Temp 97.9  F (36.6  C) (Oral)   Ht 1.676 m (5' 6\")   Wt 63.5 kg (140 lb)   LMP 04/28/2009   SpO2 99%   BMI 22.60 kg/m       LABS:  TFTs:  ENDO THYROID LABS-[facility] Latest Ref Rng & Units 3/22/2019 8/29/2017   TSH 0.40 - 4.00 mU/L <0.01 (L) 0.33 (L)   T4 FREE 0.76 - 1.46 ng/dL 2.27 (H) 1.25       CBC:  Lab Results   Component Value Date    WBC 5.5 03/22/2019     Lab Results   Component Value Date    RBC 3.44 03/22/2019     Lab Results   Component Value Date    HGB 9.8 03/22/2019     Lab Results   Component Value Date    HCT 28.9 03/22/2019     No components found for: MCT  Lab Results   Component Value Date    MCV 84 03/22/2019     Lab Results   Component Value Date    MCH 28.5 03/22/2019     Lab Results   Component Value Date    MCHC 33.9 03/22/2019     Lab Results   Component Value Date    RDW 22.2 03/22/2019     Lab Results   Component Value Date     03/22/2019         LFTs:  Liver Function Studies -   Recent Labs   Lab Test 03/22/19  0942   PROTTOTAL 6.9   ALBUMIN 3.9   BILITOTAL 3.1*   ALKPHOS 76   AST 24   ALT 27         All pertinent notes, labs, and images personally reviewed by me.     A/P  Ms.Sarah ROWDY Christian is a 53 year old here for the evaluation of hyperthyroidism.    Hyperthyroidism:  Differential for hyperthyroidism includes:  Graves' disease, thyroiditis, or autonomous hyperfunctioning nodule.  An uptake and scan of her thyroid gland will help differentiate the diagnosis.  In Graves' disease her uptake will be homogeneous and increased, in thyroiditis her uptake will be low, and in an autonomous hyperfunctioing nodule the uptake will be " increased in a focal area.    History of long-standing subclinical hyperthyroidism.  Was not on medication in past  Labs from March 2019 consistent with hyperthyroidism with suppressed TSH and high free T4  Plan was to get repeat labs along with screening for Graves' disease but as noted below patient is not feeling well  No fever, tachycardia, diarrhea.   Presentation does not appear like thyroid storm.    But recommend to get thyroid labs rechecked at emergency department.      Intractable nausea:  Patient was earlier seen by .  She was prescribed meclizine but she has not picked up the prescription yet  Patient is feeling extremely nauseous and dizzy.  Intractable nausea and vertigo  She is unsteady  I recommend that she go to the emergency department for further assessment and management of intractable nausea and vertigo.  I recommend to get thyroid labs checked at emergency department    Addendum  Patient was feeling sick and was agreeable to go to emergency department  EMS was called and patient was transferred to emergency department in stable condition.      Follow-up:  In 2-4 weeks      Iraida Gutiérrez MD  Endocrinology  [facility]  CC: Brenna Morse     More than 50% of face to face time spent with Ms. Chrsitian on counseling / coordinating her care.    I spoke with  and EMS staff personally and discussed care.    All questions were answered.  The patient indicates understanding of the above issues and agrees with the plan set forth.         Again, thank you for allowing me to participate in the care of your patient.        Sincerely,        Iraida Gutiérrez MD

## 2019-04-30 NOTE — PROGRESS NOTES
SUBJECTIVE:   Kari Christian is a 53 year old female who presents to clinic today for the following   health issues:    VERTIGO/NAUSEA      Duration: 2 days    Description  nausea and left ear fullness/plugged.    Severity: severe    Accompanying signs and symptoms: Dizziness, body tingling    History (predisposing factors):  none    Precipitating or alleviating factors: None    Therapies tried and outcome:  none    Gave patient subligual Zofran 4mg while in clinic.   NDC: 10712-350-40  B/N - [id number]  Exp: 09/2021    Pt is here with 1 day of acute onset dizziness (light headedness, worse with changing positions of head) that initially started with ear fullness sensation, worse on the left.  Has progressed to more nausea and dizziness.      Hx of depression - recently started on lexapro 5mg for 2 ~ weeks and discontinued about 2-3 weeks ago.  Has a hx of GERD that causes cough.  On daily omeprazole.  Missed some doses (about a week when feeling better).  Recently diagnosed and treated for sinusitis in Feb.  Has recent diagnosis of low TSH, elevated T4 3/22/2019, pending endo eval.  No further diagnostics.  Reports occasional palpitations, but no worsening in the last 24 hours.  No syncope or presyncope.    Additional history: as documented    Reviewed  and updated as needed this visit by clinical staff         Reviewed and updated as needed this visit by Provider         Patient Active Problem List   Diagnosis     Hereditary spherocytosis (H)     Major depressive disorder, single episode, mild (H)     CARDIOVASCULAR SCREENING; LDL GOAL LESS THAN 160     Anxiety     Vitamin D deficiency     Irritable bowel syndrome with diarrhea     Hyperthyroidism     Tobacco abuse     Intermittent asthma     Past Surgical History:   Procedure Laterality Date     CHOLECYSTECTOMY  2007     COLONOSCOPY  09/2014     COLONOSCOPY  01/15/2018    Dr. Wade [facility]     ESOPHAGOSCOPY, GASTROSCOPY, DUODENOSCOPY (EGD), COMBINED N/A  1/15/2018    Procedure: COMBINED ESOPHAGOSCOPY, GASTROSCOPY, DUODENOSCOPY (EGD), BIOPSY SINGLE OR MULTIPLE;  ESOPHAGOSCOPY, GASTROSCOPY, DUODENOSCOPY (EGD) with cold biopsies of duodenum and  COLONOSCOPY with polypectomy;  Surgeon: Chad Wade MD;  Location:  GI     HEAD & NECK SURGERY      wisdom teeth removed       Social History     Tobacco Use     Smoking status: Current Every Day Smoker     Packs/day: 0.30     Years: 20.00     Pack years: 6.00     Types: Cigarettes     Smokeless tobacco: Never Used   Substance Use Topics     Alcohol use: Yes     Alcohol/week: 1.2 - 1.8 oz     Types: 2 - 3 Standard drinks or equivalent per week     Comment: socially     Family History   Problem Relation Age of Onset     Hypertension Mother      Arthritis Mother      Respiratory Mother         Emphysema     Gastrointestinal Disease Mother      Circulatory Mother      C.A.D. Father 59     Hypertension Father      Cancer Father         Esophageal     Gastrointestinal Disease Father         Gallstones, Gallbladder removal, Colostomy, diverticulitis     Cancer Maternal Grandfather         Kidney     Cancer Paternal Grandfather         Lung, smoker     Breast Cancer Sister      Thyroid Disease Sister      Cancer - colorectal No family hx of      Colon Cancer No family hx of          Current Outpatient Medications   Medication Sig Dispense Refill     FOLIC ACID PO Take 1 mg by mouth daily       Ibuprofen (ADVIL PO)        omeprazole (PRILOSEC) 20 MG DR capsule Take 1 capsule (20 mg) by mouth daily 30 capsule 1     albuterol (PROAIR HFA, PROVENTIL HFA, VENTOLIN HFA) 108 (90 BASE) MCG/ACT inhaler Inhale 2 puffs into the lungs every 6 hours as needed for shortness of breath / dyspnea (Patient not taking: Reported on 3/22/2019) 3 Inhaler 1     escitalopram (LEXAPRO) 10 MG tablet Take 1/2 tab x one week, then 1 tab daily (Patient not taking: Reported on 4/30/2019) 30 tablet 1     Allergies   Allergen Reactions     Cephalexin  "Itching     Sulfa Drugs        ROS:  All other systems on a 10-point review are negative, unless otherwise noted in HPI    OBJECTIVE:     /60   Pulse 98   Temp 97.9  F (36.6  C) (Oral)   Ht 1.676 m (5' 6\")   Wt 63.4 kg (139 lb 11.2 oz)   LMP 04/28/2009   SpO2 99%   BMI 22.55 kg/m    Body mass index is 22.55 kg/m .  GENERAL APPEARANCE: alert, no distress and appears ill due to nausea  EYES: Eyes grossly normal to inspection, PERRL and conjunctivae and sclerae normal  ENT: bilateral opaque tms with limited mobility on insufflation.  RESP: lungs clear to auscultation - no rales, rhonchi or wheezes  CV: regular rates and rhythm, normal S1 S2, no S3 or S4 and no murmur, click or rub  SKIN: no suspicious lesions or rashes  NEURO: Normal strength and tone, mentation intact, speech normal, DTR symmetrically normal in lower extremities, gait normal including heel/toe/tandem walking, cranial nerves 2-12 intact (decreased hearing on left side), Romberg negative and rapid alternating movements normal  PSYCH: mentation appears normal and affect normal/bright    Diagnostic Test Results:  none     ASSESSMENT/PLAN:     52 yo pt followed by Dr. Morse with hx of depression/anxiety, GERD, and recently diagnosed with hyperthyroidism in March here for acute same day visit for acute vertigo with nausea (severe).      ICD-10-CM    1. Vertigo R42 ondansetron (ZOFRAN-ODT) ODT tab 4 mg     meclizine (ANTIVERT) 25 MG tablet   2. Nausea R11.0 meclizine (ANTIVERT) 25 MG tablet   3. Middle ear effusion, bilateral H65.93    4. Hyperthyroidism E05.90    5. Gastroesophageal reflux disease with esophagitis K21.0    6. AUDREY (generalized anxiety disorder) F41.1      Vertigo with nausea.  Of note, was diagnosed with hyperthyroidism on 3/22 but has not been able to be seen by endo since - does not appear to be in thyroid storm as vitals are normal.  Heart rate is regular, subjective palpitations present but rare and stable.  Unclear if " symptoms are related to abnormal thyroid - able to get pt into same day appointment with endo for further evaluation.    No significant improvement with ODT     Was able to get pt into endo for acute same day visit after my visit.    Other etiologies including BPPV (bilateral ear effusions and decreased hearing on left, normal neuro exam), acute vertebral migraine, recent changes in antidepressant meds, poorly controlled reflux with decreased adherence to PPI (nausea only).  Central vertigo can be considered if progression or does not self-resolve after a week or so.    - prn meclizine and f/u with PCP    Patient Instructions   Vertigo with nausea:    -- I do want you to be evaluated by endocrinology today, as this may be related to hyperthyroidism.  -- Other causes of dizziness include benign positional vertigo, vertebral migraine, or vertigo related to inner ear problems (middle ear effusion).  The good news is, these are usually self-limited.  I prescribed meclizine as needed for vertigo, however I'd like you to follow-up with your PCP in about a week if no improvement.  -- Another cause may be a side effect of starting and abruptly stopping your depression medication.  This too should pass on it's own.  -- Nausea may also be due to poorly controlled reflux - make sure to take your antacid daily.    Greater than 50% of the 40 minute visit was spent in counseling and coordination of care regarding patient conditions above.        Deepthi Giordano MD  [facility]

## 2019-04-30 NOTE — ED NOTES
Bed: ED30  Expected date: 4/30/19  Expected time:   Means of arrival: Ambulance  Comments:  HE 54yo dizziness

## 2019-04-30 NOTE — NURSING NOTE
ENDOCRINOLOGY INTAKE FORM    Patient Name:  Kari Christian  :  1965    Is patient Diabetic?   No  Does patient have non-diabetic or other endocrine issues?  Yes: hyperthyroidism    Vitals: LMP 2009   BMI= There is no height or weight on file to calculate BMI.      Smoking and Alcohol use:  Social History     Tobacco Use     Smoking status: Current Every Day Smoker     Packs/day: 0.30     Years: 20.00     Pack years: 6.00     Types: Cigarettes     Smokeless tobacco: Never Used   Substance Use Topics     Alcohol use: Yes     Alcohol/week: 1.2 - 1.8 oz     Types: 2 - 3 Standard drinks or equivalent per week     Comment: socially     Drug use: No       Janeth Rai, ALESHIA  [facility]  [place]

## 2019-04-30 NOTE — ED AVS SNAPSHOT
[facility] Emergency Department  [address]  [facility] [address]  Phone:  [phone]  Fax:  [phone]                                    Kari Christian   MRN: [id number]    Department:  [facility] Emergency Department   Date of Visit:  4/30/2019           After Visit Summary Signature Page    I have received my discharge instructions, and my questions have been answered. I have discussed any challenges I see with this plan with the nurse or doctor.    ..........................................................................................................................................  Patient/Patient Representative Signature      ..........................................................................................................................................  Patient Representative Print Name and Relationship to Patient    ..................................................               ................................................  Date                                   Time    ..........................................................................................................................................  Reviewed by Signature/Title    ...................................................              ..............................................  Date                                               Time          22EPIC Rev 08/18

## 2019-04-30 NOTE — ED PROVIDER NOTES
"  History     Chief Complaint:  Dizziness    The history is provided by the patient.      Kari Christian is a 53 year old female with a history of hyperthyroidism who presents for evaluation of dizziness. The patient has experienced continuous room-spinning dizziness since last night at 6:00 PM as well as plugged ears and a \"ringing\" in her ears bilaterally, left more that right. Patient states that she has also been nauseated with a strong headache since this morning. States that she usually does not usually get headaches and that this headache is \"all over\" her head. On presentation, the patient states that her right ear seems open while the left is still experiencing decreased hearing, however with decreased pressure in both. Also endorses a \"minor amount\" of ear ringing along with transient hyperventilating which has since resolved. At this point the patient states that the dizziness is constant except for when she closes her eyes. Also endorses photophobia. She sought evaluation earlier today from primary care doctor and endocrinologist prior to coming in for evaluation in the emergency department. Patient denies neck pain, chest pain, shortness of breath, weakness, tingling, recent falls or injuries, recent illness, or other complaint. Denies past history of vertigo. No speech changes. No numbness, tingling, or weakness. The dizziness is exacerbated when she moves.    Allergies:  Cephalexin  Sulfa Drugs      Medications:    Albuterol inhaler  Lexapro  Antivert  Prilosec    Past Medical History:    Hereditary spherocytosis  Major depressive disorder  Anxiety  Vitamin D deficiency  Tobacco use disorder  Hyperthyroidism    Past Surgical History:    Cholecystectomy  Colonoscopy (2x)  EGD combined  [place] tooth removal     Family History:    Hypertension  Arthritis  Emphysema  GI disease  CAD  Esophageal cancer  Lung cancer  Kidney cancer  Breast cancer    Social History:  Presents alone   Tobacco use: Current every " day smoker (0.3 ppd for 20 years)    Alcohol use: Yes (2-3 drinks per week)  PCP: Brenna Morse    Marital Status:        Review of Systems   HENT:        (+) ear fullness and nausea   Gastrointestinal: Positive for nausea.   Neurological: Positive for dizziness and headaches.   All other systems reviewed and are negative.    Physical Exam     Patient Vitals for the past 24 hrs:   BP Temp Temp src Pulse Resp SpO2   04/30/19 1645 -- -- -- -- -- 98 %   04/30/19 1630 114/70 -- -- 97 -- 100 %   04/30/19 1548 125/71 97.8  F (36.6  C) Oral 84 20 100 %        Physical Exam  General: Appears uncomfortable, keeping eyes closed and holding hand over face.  Nontoxic.  Head:  Scalp, face, and head appear normal.  Normocephalic, atraumatic.  No tenderness to palpation.  Eyes:  Pupils are equal, round, and reactive to light.  There is subtle horizontal or torsional nystagmus which is non-fatigable.  Nystagmus is non-direction changing.    Conjunctivae non-injected and sclerae white  ENT:    The external nose is normal    Pinnae are normal. Bilateral TMs clear without erythema, bulging, or effusion. Auditory canals normal.     The oropharynx is normal, mucous membranes moist    Posterior pharynx clear without swelling, exudates or erythema     Uvula is in the midline  Neck:  Normal range of motion.  Soft tissues of the neck are nontender without swelling.    There is no rigidity noted    Trachea is in the midline  CV:  Regular rate and rhythm     Normal S1/S2, no S3/S4    No murmur or rub. Radial pulses 2+ bilaterally   Resp:  Lungs are clear and equal bilaterally    There is no tachypnea    No increased work of breathing    No rales, wheezing, or rhonchi  GI:  Abdomen is soft, no rigidity or guarding    No distension, or mass    No tenderness or rebound tenderness   MS:  Normal muscular tone    Symmetric motor strength    No lower extremity edema  Skin:  No rash or acute skin lesions noted  Neuro: Somnolent, A&Ox3,  GCS 15    CN II - XII intact    Speech clear, fluent, and normal    Strength 5/5 and symmetric in bilateral upper and lower extremities.    No pronator drift. No leg drift. SILT throughout.    No ankle clonus    FTN testing normal.  Heel-to-shin testing normal.  No tremor.     No meningismus   Psych:  Normal affect.  Appropriate interactions.    Emergency Department Course   ECG (16:22:50):  Rate 88 bpm. VT interval 124. QRS duration 84. QT/QTc 388/469. P-R-T axes 26 66 49. Normal sinus rhythm with sinus arrhythmia. Changes noted. Agree with computer interpretation. Interpreted at 1630 by Rick Dooley MD.     Imaging:  Radiographic findings were communicated with the patient who voiced understanding of the findings.    CT Head Neck Angio without & with contrast:  IMPRESSION: Tortuous left internal carotid artery. Otherwise normal CT angiogram of the head and neck.    CT Head without contrast:  IMPRESSION: Normal CT scan of the head.    Imaging independently reviewed and agree with radiologist interpretation.      Laboratory:  CBC: HGB 9.0 (L) o/w WNL (WBC 5.9, )   CMP:  (H), Creatinine 0.51 (L), Bilirubin 4.1 (H) o/w WNL   Magnesium: 1.8  TSH with free T4 reflex: <0.01  TSH with free: 2.78 (H)     Interventions:  1552: Meclizine 0.5 mg IV  1553: NS 1L IV Bolus    1635: Meclizine 25 mg PO    1816: Prednisolone tablet 60 mg PO       Emergency Department Course:  1540: Dr. Carpenter evaluated the patient and got her started, gave Antivert.    IV inserted and blood drawn. Above interventions provided. Blood was sent to the lab for further testing, results above.     Past medical records, nursing notes, and vitals reviewed.  1602: I performed an exam of the patient and obtained history, as documented above. Patient states that her nausea has alleviated.    The patient was sent for a CT while in the emergency department, findings above.     1805: I rechecked the patient. Explained findings to the patient.  Patient states that she is feeling improved. Will plan for road test to [place].    1830: I rechecked the patient. Patient has passed road test. Findings and plan explained to the Patient. Patient discharged home with instructions regarding supportive care, medications, and reasons to return. The importance of close follow-up was reviewed.      Impression & Plan      Medical Decision Making:  Kari Christian is a 53 year old female who presents with acute onset of vertigo and dizziness. On my evaluation, the patient appears acutely uncomfortable. She has no focal neurologic deficits but does have subtle horizontal or torsional nystagmus which is not direction-changing. In addition, this is associated with tinnitus and hearing loss in the left ear. This points toward a likely peripheral cause given the otologic symptoms. The tympanic membranes appear normal. She does note a headache. Central causes were also considered but felt to be less likely. However, given the concern for this CTA and CT scan of head and neck were obtained negative for any acute vascular or other intracranial pathology. I do not feel that the patient requires an emergent MRI at this point as I feel that posterior circulation stroke would be very unlikely given her lack of risk factors and normal vessel imaging. The remainder of her workup in the emergency department was otherwise reassuring. Patient was treated with steroids, meclizine, and antiemetic and felt somewhat improved. Ultimately, I feel the patient likely has labyrinthitis, however, Ménière's disease is also considered on the differential. There is no evidence of cerumen impaction or TM perforation. Her symptoms are not typical for BPPV. I have recommended that the patient follow up with her primary care doctor as well as ENT specialist for ongoing management. She will be provided with a prednisone taper and meclizine for home. Patient was agreeable to plan of care. She was able to  ambulate in the emergency department unassisted and was discharged in stable condition. Return precautions were discussed with patient. The patient's questions were answered and the patient was agreeable with discharge.     Diagnosis:    ICD-10-CM   1. Acute labyrinthitis, left H83.02   2. Vertigo R42       Disposition:  Discharged to home with plan as outlined.    Discharge Medications:=START taking      Dose / Directions   ondansetron 4 MG ODT tab  Commonly known as:  ZOFRAN ODT      Dose:  4 mg  Take 1 tablet (4 mg) by mouth every 8 hours as needed for nausea or vomiting  Quantity:  20 tablet  Refills:  0     predniSONE 10 MG tablet  Commonly known as:  DELTASONE      Start taking on:  4/30/2019  Take 60 mg by mouth daily for 4 days, THEN 40 mg daily for 1 day, THEN 30 mg daily for 1 day, THEN 20 mg daily for 1 day, THEN 10 mg daily for 1 day, THEN 5 mg daily for 1 day.  Quantity:  35 tablet  Refills:  0        CONTINUE these medicines which may have CHANGED, or have new prescriptions. If we are uncertain of the size of tablets/capsules you have at home, strength may be listed as something that might have changed.      Dose / Directions   meclizine 25 MG tablet  Commonly known as:  ANTIVERT  This may have changed:      how much to take    when to take this    reasons to take this      Dose:  25-50 mg  Take 1-2 tablets (25-50 mg) by mouth every 6 hours as needed for dizziness or nausea  Quantity:  30 tablet  Refills:  0     =  Scribe Disclosure:  ONOFRE, aGllo Hi, am serving as a scribe at 6:15 PM on 4/30/2019 to document services personally performed by Rick Dooley MD based on my observations and the provider's statements to me.  4/30/2019   EMERGENCY DEPARTMENT     Rick Dooley MD  05/01/19 1953

## 2019-04-30 NOTE — Clinical Note
I am not sure what should I bill for this patient.  Spent more than 30 minutes coordinating care with primary care provider and EMS.Was not able to discuss hyperthyroidism but it took quite a bit of time in coordinating care.

## 2019-05-01 LAB — INTERPRETATION ECG - MUSE: NORMAL

## 2019-05-01 ASSESSMENT — ASTHMA QUESTIONNAIRES: ACT_TOTALSCORE: 23

## 2019-05-07 ENCOUNTER — OFFICE VISIT (OUTPATIENT)
Dept: INTERNAL MEDICINE | Facility: CLINIC | Age: 54
End: 2019-05-07
Payer: COMMERCIAL

## 2019-05-07 VITALS
DIASTOLIC BLOOD PRESSURE: 56 MMHG | RESPIRATION RATE: 16 BRPM | BODY MASS INDEX: 22.13 KG/M2 | HEIGHT: 66 IN | SYSTOLIC BLOOD PRESSURE: 108 MMHG | TEMPERATURE: 98.5 F | WEIGHT: 137.7 LBS | OXYGEN SATURATION: 96 % | HEART RATE: 103 BPM

## 2019-05-07 DIAGNOSIS — H83.02 LABYRINTHITIS OF LEFT EAR: Primary | ICD-10-CM

## 2019-05-07 PROCEDURE — 99213 OFFICE O/P EST LOW 20 MIN: CPT | Performed by: NURSE PRACTITIONER

## 2019-05-07 ASSESSMENT — MIFFLIN-ST. JEOR: SCORE: 1236.35

## 2019-05-07 NOTE — PROGRESS NOTES
SUBJECTIVE:   Kari Christian is a 53 year old female who presents to clinic today for the following   health issues:        ED/UC Followup:    Facility:  [facility]  Date of visit: 04/30/19  Reason for visit: acute labyrinthitis  Current Status: a little better, ear is still the same, no nausea/vomiting or dizziness. Has ENT OV next week.             Additional history: as documented    Reviewed  and updated as needed this visit by clinical staff  Tobacco  Allergies  Meds  Med Hx  Surg Hx  Fam Hx  Soc Hx        Reviewed and updated as needed this visit by Provider         Patient Active Problem List   Diagnosis     Hereditary spherocytosis (H)     Major depressive disorder, single episode, mild (H)     CARDIOVASCULAR SCREENING; LDL GOAL LESS THAN 160     Anxiety     Vitamin D deficiency     Irritable bowel syndrome with diarrhea     Hyperthyroidism     Tobacco abuse     Intermittent asthma     Past Surgical History:   Procedure Laterality Date     CHOLECYSTECTOMY  2007     COLONOSCOPY  09/2014     COLONOSCOPY  01/15/2018    Dr. Wade [facility]     ESOPHAGOSCOPY, GASTROSCOPY, DUODENOSCOPY (EGD), COMBINED N/A 1/15/2018    Procedure: COMBINED ESOPHAGOSCOPY, GASTROSCOPY, DUODENOSCOPY (EGD), BIOPSY SINGLE OR MULTIPLE;  ESOPHAGOSCOPY, GASTROSCOPY, DUODENOSCOPY (EGD) with cold biopsies of duodenum and  COLONOSCOPY with polypectomy;  Surgeon: Chad Wade MD;  Location:  GI     HEAD & NECK SURGERY      wisdom teeth removed       Social History     Tobacco Use     Smoking status: Current Every Day Smoker     Packs/day: 0.30     Years: 20.00     Pack years: 6.00     Types: Cigarettes     Smokeless tobacco: Never Used   Substance Use Topics     Alcohol use: Yes     Alcohol/week: 1.2 - 1.8 oz     Types: 2 - 3 Standard drinks or equivalent per week     Comment: socially     Family History   Problem Relation Age of Onset     Hypertension Mother      Arthritis Mother      Respiratory Mother         Emphysema      Gastrointestinal Disease Mother      Circulatory Mother      C.A.D. Father 59     Hypertension Father      Cancer Father         Esophageal     Gastrointestinal Disease Father         Gallstones, Gallbladder removal, Colostomy, diverticulitis     Cancer Maternal Grandfather         Kidney     Cancer Paternal Grandfather         Lung, smoker     Breast Cancer Sister      Thyroid Disease Sister      Cancer - colorectal No family hx of      Colon Cancer No family hx of          Current Outpatient Medications   Medication Sig Dispense Refill     FOLIC ACID PO Take 1 mg by mouth daily       Ibuprofen (ADVIL PO)        omeprazole (PRILOSEC) 20 MG DR capsule Take 1 capsule (20 mg) by mouth daily 30 capsule 1     predniSONE (DELTASONE) 10 MG tablet Take 60 mg by mouth daily for 4 days, THEN 40 mg daily for 1 day, THEN 30 mg daily for 1 day, THEN 20 mg daily for 1 day, THEN 10 mg daily for 1 day, THEN 5 mg daily for 1 day. 35 tablet 0     albuterol (PROAIR HFA, PROVENTIL HFA, VENTOLIN HFA) 108 (90 BASE) MCG/ACT inhaler Inhale 2 puffs into the lungs every 6 hours as needed for shortness of breath / dyspnea (Patient not taking: Reported on 5/7/2019) 3 Inhaler 1     escitalopram (LEXAPRO) 10 MG tablet Take 1/2 tab x one week, then 1 tab daily (Patient not taking: Reported on 5/7/2019) 30 tablet 1     meclizine (ANTIVERT) 25 MG tablet Take 1-2 tablets (25-50 mg) by mouth every 6 hours as needed for dizziness or nausea (Patient not taking: Reported on 5/7/2019) 30 tablet 0     ondansetron (ZOFRAN ODT) 4 MG ODT tab Take 1 tablet (4 mg) by mouth every 8 hours as needed for nausea or vomiting (Patient not taking: Reported on 5/7/2019) 20 tablet 0     BP Readings from Last 3 Encounters:   05/07/19 108/56   04/30/19 114/70   04/30/19 108/60    Wt Readings from Last 3 Encounters:   05/07/19 62.5 kg (137 lb 11.2 oz)   04/30/19 63.5 kg (140 lb)   04/30/19 63.4 kg (139 lb 11.2 oz)                    ROS:  Constitutional, HEENT,  "cardiovascular, pulmonary, gi and gu systems are negative, except as otherwise noted.    OBJECTIVE:     /56 (BP Location: Right arm, Patient Position: Sitting, Cuff Size: Adult Regular)   Pulse 103   Temp 98.5  F (36.9  C) (Oral)   Resp 16   Ht 1.676 m (5' 6\")   Wt 62.5 kg (137 lb 11.2 oz)   LMP 04/28/2009   SpO2 96%   BMI 22.23 kg/m    Body mass index is 22.23 kg/m .  GENERAL: healthy, alert and no distress  EYES: Eyes grossly normal to inspection, PERRL and conjunctivae and sclerae normal  HENT: left ear: gray retracted TM, nasal mucosa edematous , oropharynx clear and oral mucous membranes moist        ASSESSMENT/PLAN:               ICD-10-CM    1. Labyrinthitis of left ear H83.02        Patient Instructions   flonase 2 sprays daily  Continue current meds.    Bettie Lyons, NP  [facility]        "

## 2019-05-20 ENCOUNTER — TRANSFERRED RECORDS (OUTPATIENT)
Dept: HEALTH INFORMATION MANAGEMENT | Facility: CLINIC | Age: 54
End: 2019-05-20

## 2019-05-27 ENCOUNTER — TELEPHONE (OUTPATIENT)
Dept: PEDIATRICS | Facility: CLINIC | Age: 54
End: 2019-05-27

## 2019-05-27 DIAGNOSIS — K21.9 GASTROESOPHAGEAL REFLUX DISEASE WITHOUT ESOPHAGITIS: ICD-10-CM

## 2019-05-27 DIAGNOSIS — R10.13 EPIGASTRIC PAIN: ICD-10-CM

## 2019-05-28 NOTE — TELEPHONE ENCOUNTER
"Requested Prescriptions   Pending Prescriptions Disp Refills     omeprazole (PRILOSEC) 20 MG DR capsule [Pharmacy Med Name: OMEPRAZOLE DR 20 MG CAPSULE]    Last Written Prescription Date:  3/22/2019  Last Fill Quantity: 30,  # refills: 1   Last office visit: 4/30/2019 with prescribing provider:  Brenna Morse     Future Office Visit:     30 capsule 1     Sig: TAKE 1 CAPSULE BY MOUTH EVERY DAY       PPI Protocol Passed - 5/27/2019  1:21 AM        Passed - Not on Clopidogrel (unless Pantoprazole ordered)        Passed - No diagnosis of osteoporosis on record        Passed - Recent (12 mo) or future (30 days) visit within the authorizing provider's specialty     Patient had office visit in the last 12 months or has a visit in the next 30 days with authorizing provider or within the authorizing provider's specialty.  See \"Patient Info\" tab in inbasket, or \"Choose Columns\" in Meds & Orders section of the refill encounter.              Passed - Medication is active on med list        Passed - Patient is age 18 or older        Passed - No active pregnacy on record        Passed - No positive pregnancy test in past 12 months          "

## 2019-07-23 ENCOUNTER — OFFICE VISIT (OUTPATIENT)
Dept: INTERNAL MEDICINE | Facility: CLINIC | Age: 54
End: 2019-07-23
Payer: COMMERCIAL

## 2019-07-23 VITALS
OXYGEN SATURATION: 97 % | RESPIRATION RATE: 12 BRPM | TEMPERATURE: 98.7 F | HEIGHT: 66 IN | SYSTOLIC BLOOD PRESSURE: 110 MMHG | BODY MASS INDEX: 21.86 KG/M2 | WEIGHT: 136 LBS | DIASTOLIC BLOOD PRESSURE: 70 MMHG | HEART RATE: 78 BPM

## 2019-07-23 DIAGNOSIS — D58.0 HEREDITARY SPHEROCYTOSIS (H): ICD-10-CM

## 2019-07-23 DIAGNOSIS — E05.90 HYPERTHYROIDISM: ICD-10-CM

## 2019-07-23 DIAGNOSIS — H83.02 LABYRINTHITIS OF LEFT EAR: ICD-10-CM

## 2019-07-23 DIAGNOSIS — D64.9 LOW HEMOGLOBIN: ICD-10-CM

## 2019-07-23 DIAGNOSIS — Z00.00 ROUTINE GENERAL MEDICAL EXAMINATION AT A HEALTH CARE FACILITY: Primary | ICD-10-CM

## 2019-07-23 LAB
BASOPHILS # BLD AUTO: 0.1 10E9/L (ref 0–0.2)
BASOPHILS NFR BLD AUTO: 0.8 %
DIFFERENTIAL METHOD BLD: ABNORMAL
EOSINOPHIL # BLD AUTO: 0.4 10E9/L (ref 0–0.7)
EOSINOPHIL NFR BLD AUTO: 6 %
ERYTHROCYTE [DISTWIDTH] IN BLOOD BY AUTOMATED COUNT: 20.6 % (ref 10–15)
HCT VFR BLD AUTO: 27.8 % (ref 35–47)
HGB BLD-MCNC: 9.5 G/DL (ref 11.7–15.7)
LYMPHOCYTES # BLD AUTO: 2 10E9/L (ref 0.8–5.3)
LYMPHOCYTES NFR BLD AUTO: 32.6 %
MCH RBC QN AUTO: 29.3 PG (ref 26.5–33)
MCHC RBC AUTO-ENTMCNC: 34.2 G/DL (ref 31.5–36.5)
MCV RBC AUTO: 86 FL (ref 78–100)
MONOCYTES # BLD AUTO: 0.4 10E9/L (ref 0–1.3)
MONOCYTES NFR BLD AUTO: 7.4 %
NEUTROPHILS # BLD AUTO: 3.2 10E9/L (ref 1.6–8.3)
NEUTROPHILS NFR BLD AUTO: 53.2 %
PLATELET # BLD AUTO: 222 10E9/L (ref 150–450)
RBC # BLD AUTO: 3.24 10E12/L (ref 3.8–5.2)
WBC # BLD AUTO: 6 10E9/L (ref 4–11)

## 2019-07-23 PROCEDURE — 84439 ASSAY OF FREE THYROXINE: CPT | Performed by: INTERNAL MEDICINE

## 2019-07-23 PROCEDURE — 85025 COMPLETE CBC W/AUTO DIFF WBC: CPT | Performed by: INTERNAL MEDICINE

## 2019-07-23 PROCEDURE — 83550 IRON BINDING TEST: CPT | Performed by: INTERNAL MEDICINE

## 2019-07-23 PROCEDURE — 99396 PREV VISIT EST AGE 40-64: CPT | Performed by: INTERNAL MEDICINE

## 2019-07-23 PROCEDURE — 84443 ASSAY THYROID STIM HORMONE: CPT | Performed by: INTERNAL MEDICINE

## 2019-07-23 PROCEDURE — 80053 COMPREHEN METABOLIC PANEL: CPT | Performed by: INTERNAL MEDICINE

## 2019-07-23 PROCEDURE — 82728 ASSAY OF FERRITIN: CPT | Performed by: INTERNAL MEDICINE

## 2019-07-23 PROCEDURE — 84480 ASSAY TRIIODOTHYRONINE (T3): CPT | Performed by: INTERNAL MEDICINE

## 2019-07-23 PROCEDURE — 83540 ASSAY OF IRON: CPT | Performed by: INTERNAL MEDICINE

## 2019-07-23 PROCEDURE — 36415 COLL VENOUS BLD VENIPUNCTURE: CPT | Performed by: INTERNAL MEDICINE

## 2019-07-23 ASSESSMENT — ANXIETY QUESTIONNAIRES
6. BECOMING EASILY ANNOYED OR IRRITABLE: SEVERAL DAYS
3. WORRYING TOO MUCH ABOUT DIFFERENT THINGS: MORE THAN HALF THE DAYS
GAD7 TOTAL SCORE: 10
1. FEELING NERVOUS, ANXIOUS, OR ON EDGE: MORE THAN HALF THE DAYS
7. FEELING AFRAID AS IF SOMETHING AWFUL MIGHT HAPPEN: NOT AT ALL
2. NOT BEING ABLE TO STOP OR CONTROL WORRYING: MORE THAN HALF THE DAYS
5. BEING SO RESTLESS THAT IT IS HARD TO SIT STILL: SEVERAL DAYS
IF YOU CHECKED OFF ANY PROBLEMS ON THIS QUESTIONNAIRE, HOW DIFFICULT HAVE THESE PROBLEMS MADE IT FOR YOU TO DO YOUR WORK, TAKE CARE OF THINGS AT HOME, OR GET ALONG WITH OTHER PEOPLE: VERY DIFFICULT

## 2019-07-23 ASSESSMENT — ENCOUNTER SYMPTOMS
HEARTBURN: 1
HEMATURIA: 0
NERVOUS/ANXIOUS: 1
NAUSEA: 0
DIZZINESS: 1
COUGH: 1
FEVER: 0
PALPITATIONS: 1
BREAST MASS: 0
FREQUENCY: 0
EYE PAIN: 0
DIARRHEA: 1
ARTHRALGIAS: 0
MYALGIAS: 0
CONSTIPATION: 0
PARESTHESIAS: 0
WEAKNESS: 1
ABDOMINAL PAIN: 0
SORE THROAT: 0
HEMATOCHEZIA: 0
SHORTNESS OF BREATH: 1
DYSURIA: 0
HEADACHES: 1
JOINT SWELLING: 0
CHILLS: 0

## 2019-07-23 ASSESSMENT — MIFFLIN-ST. JEOR: SCORE: 1228.64

## 2019-07-23 ASSESSMENT — PATIENT HEALTH QUESTIONNAIRE - PHQ9
10. IF YOU CHECKED OFF ANY PROBLEMS, HOW DIFFICULT HAVE THESE PROBLEMS MADE IT FOR YOU TO DO YOUR WORK, TAKE CARE OF THINGS AT HOME, OR GET ALONG WITH OTHER PEOPLE: VERY DIFFICULT
5. POOR APPETITE OR OVEREATING: MORE THAN HALF THE DAYS
SUM OF ALL RESPONSES TO PHQ QUESTIONS 1-9: 15
SUM OF ALL RESPONSES TO PHQ QUESTIONS 1-9: 15

## 2019-07-23 NOTE — PROGRESS NOTES
"   SUBJECTIVE:   CC: Kari Christian is an 53 year old woman who presents for preventive health visit.     Healthy Habits:     Getting at least 3 servings of Calcium per day:  NO    Bi-annual eye exam:  Yes    Dental care twice a year:  Yes    Sleep apnea or symptoms of sleep apnea:  Daytime drowsiness    Diet:  Other    Frequency of exercise:  1 day/week    Duration of exercise:  Less than 15 minutes    Taking medications regularly:  Yes    Medication side effects:  Not applicable    PHQ-2 Total Score: 4    Additional concerns today:  Yes    Pap smear done on this date: 9/1/18 (approximately), by this group: [facility], results were wnl.         Hyperthyroidism   States that she is having difficulties concentration during work.     Labyrinthitis of left ear  Patient went to the ER on 4/30/2019 due \"continuous room-spinning dizziness.\"  Reports that her symptoms included dizziness, nausea, and ringing of her ear, which has resulted in her losing hearing in her left ear.  She was seen by ENT and received prednisone. She would like a second opinion.    Tobacco abuse   Patient is still smoking, however, she is planning to quite.     Major depressive disorder, single episode, mild   Patient reports that she is going through some family issues. PHQ-9 score of 9.    Anxiety   AUDREY score of 10. Patient has tried lexapro in the past.     Other problems...  Patient reports that she has some exterior vaginal pumps that feel like a \"pimple.\"    Today's PHQ-2 Score:   PHQ-2 ( 1999 Pfizer) 7/23/2019   Q1: Little interest or pleasure in doing things 2   Q2: Feeling down, depressed or hopeless 2   PHQ-2 Score 4   Q1: Little interest or pleasure in doing things More than half the days   Q2: Feeling down, depressed or hopeless More than half the days   PHQ-2 Score 4     Social History     Tobacco Use     Smoking status: Current Every Day Smoker     Packs/day: 0.30     Years: 20.00     Pack years: 6.00     Types: Cigarettes     " Smokeless tobacco: Never Used   Substance Use Topics     Alcohol use: Yes     Alcohol/week: 1.2 - 1.8 oz     Types: 2 - 3 Standard drinks or equivalent per week     Comment: socially       Alcohol Use 7/23/2019   Prescreen: >3 drinks/day or >7 drinks/week? No   Prescreen: >3 drinks/day or >7 drinks/week? -     Reviewed orders with patient.  Reviewed health maintenance and updated orders accordingly - Yes    Mammogram Screening: Patient over age 50, mutual decision to screen reflected in health maintenance.    Pertinent mammograms are reviewed under the imaging tab.  History of abnormal Pap smear: last pap smear screening was on 9/01/2018 done by OB/GYN.      Reviewed and updated as needed this visit by clinical staff  Tobacco  Allergies  Meds  Problems  Med Hx  Surg Hx  Fam Hx  Soc Hx        Reviewed and updated as needed this visit by Provider  Meds  Problems        Review of Systems   Constitutional: Negative for chills and fever.   HENT: Positive for congestion, ear pain and hearing loss. Negative for sore throat.    Eyes: Positive for visual disturbance. Negative for pain.   Respiratory: Positive for cough and shortness of breath.    Cardiovascular: Positive for palpitations. Negative for chest pain and peripheral edema.   Gastrointestinal: Positive for diarrhea and heartburn. Negative for abdominal pain, constipation, hematochezia and nausea.   Breasts:  Negative for tenderness, breast mass and discharge.   Genitourinary: Positive for genital sores and urgency. Negative for dysuria, frequency, hematuria, pelvic pain and vaginal discharge.   Musculoskeletal: Negative for arthralgias, joint swelling and myalgias.   Skin: Negative for rash.   Neurological: Positive for dizziness, weakness and headaches. Negative for paresthesias.   Psychiatric/Behavioral: Positive for mood changes. The patient is nervous/anxious.    `  This document serves as a record of the services and decisions personally performed and  "made by Brenna Morse MD. It was created on his behalf by Portia Hdez, a trained medical scribe. The creation of this document is based on the provider's statements to the medical scribe.  Portia Hdez July 23, 2019 3:30 PM   OBJECTIVE:   /70   Pulse 78   Temp 98.7  F (37.1  C) (Oral)   Resp 12   Ht 1.676 m (5' 6\")   Wt 61.7 kg (136 lb)   LMP 04/28/2009   SpO2 97%   Breastfeeding? No   BMI 21.95 kg/m    Physical Exam  GENERAL APPEARANCE: healthy, alert and no distress  EYES: Eyes grossly normal to inspection, PERRL and conjunctivae and sclerae normal  HENT: ear canals and TM's normal, nose and mouth without ulcers or lesions, oropharynx clear and oral mucous membranes moist  NECK: no adenopathy, no asymmetry, masses, or scars and thyroid normal to palpation  RESP: lungs clear to auscultation - no rales, rhonchi or wheezes  CV: regular rate and rhythm, normal S1 S2, no S3 or S4, no murmur, click or rub, no peripheral edema and peripheral pulses strong  ABDOMEN: soft, nontender, no hepatosplenomegaly, no masses and bowel sounds normal  MS: no musculoskeletal defects are noted and gait is age appropriate without ataxia  SKIN: no suspicious lesions or rashes  NEURO: Normal strength and tone, sensory exam grossly normal, mentation intact and speech normal  PSYCH: mentation appears normal and affect normal/bright    Diagnostic Test Results:  Labs reviewed in Epic  No results found for this or any previous visit (from the past 24 hour(s)).    ASSESSMENT/PLAN:   (Z00.00) Routine general medical examination at a health care facility  (primary encounter diagnosis)  Comment: Assess.  Plan: Comprehensive metabolic panel, CBC with         platelets differential, TSH, T4 free, T3 total,        Iron and iron binding capacity, Ferritin            (E05.90) Hyperthyroidism  Comment: Assess.  Plan: TSH, T4 free, T3 total            (H83.02) Labyrinthitis of left ear  Comment: patient wishes for second opinion  Plan: " "OTOLARYNGOLOGY REFERRAL            (D58.0) Hereditary spherocytosis (H)  Comment: Assess.  Plan: Iron and iron binding capacity, Ferritin            (D64.9) Low hemoglobin  Comment: Assess.  Plan: Iron and iron binding capacity, Ferritin            COUNSELING:  Reviewed preventive health counseling, as reflected in patient instructions       Regular exercise       Healthy diet/nutrition       Vision screening       Hearing screening    Estimated body mass index is 21.95 kg/m  as calculated from the following:    Height as of this encounter: 1.676 m (5' 6\").    Weight as of this encounter: 61.7 kg (136 lb).         reports that she has been smoking cigarettes.  She has a 6.00 pack-year smoking history. She has never used smokeless tobacco.  Tobacco Cessation Action Plan: Information offered: Patient not interested at this time    Counseling Resources:  ATP IV Guidelines  Pooled Cohorts Equation Calculator  Breast Cancer Risk Calculator  FRAX Risk Assessment  ICSI Preventive Guidelines  Dietary Guidelines for Americans, 2010  [employer]'s My[employer]ate  ASA Prophylaxis  Lung CA Screening    The information in this document, created by the medical scribe for me, accurately reflects the services I personally performed and the decisions made by me. I have reviewed and approved this document for accuracy.   July 23, 2019 3:46 PM     Brenna Morse MD  [facility]  Answers for HPI/ROS submitted by the patient on 7/23/2019   Annual Exam:  If you checked off any problems, how difficult have these problems made it for you to do your work, take care of things at home, or get along with other people?: Very difficult  PHQ9 TOTAL SCORE: 15    "

## 2019-07-24 ENCOUNTER — TELEPHONE (OUTPATIENT)
Dept: INTERNAL MEDICINE | Facility: CLINIC | Age: 54
End: 2019-07-24

## 2019-07-24 LAB
ALBUMIN SERPL-MCNC: 3.8 G/DL (ref 3.4–5)
ALP SERPL-CCNC: 90 U/L (ref 40–150)
ALT SERPL W P-5'-P-CCNC: 22 U/L (ref 0–50)
ANION GAP SERPL CALCULATED.3IONS-SCNC: 9 MMOL/L (ref 3–14)
AST SERPL W P-5'-P-CCNC: 15 U/L (ref 0–45)
BILIRUB SERPL-MCNC: 2.9 MG/DL (ref 0.2–1.3)
BUN SERPL-MCNC: 10 MG/DL (ref 7–30)
CALCIUM SERPL-MCNC: 8.5 MG/DL (ref 8.5–10.1)
CHLORIDE SERPL-SCNC: 107 MMOL/L (ref 94–109)
CO2 SERPL-SCNC: 24 MMOL/L (ref 20–32)
CREAT SERPL-MCNC: 0.57 MG/DL (ref 0.52–1.04)
FERRITIN SERPL-MCNC: 150 NG/ML (ref 8–252)
GFR SERPL CREATININE-BSD FRML MDRD: >90 ML/MIN/{1.73_M2}
GLUCOSE SERPL-MCNC: 108 MG/DL (ref 70–99)
IRON SATN MFR SERPL: 41 % (ref 15–46)
IRON SERPL-MCNC: 117 UG/DL (ref 35–180)
POTASSIUM SERPL-SCNC: 3.6 MMOL/L (ref 3.4–5.3)
PROT SERPL-MCNC: 6.9 G/DL (ref 6.8–8.8)
SODIUM SERPL-SCNC: 140 MMOL/L (ref 133–144)
T3 SERPL-MCNC: 244 NG/DL (ref 60–181)
T4 FREE SERPL-MCNC: 2.21 NG/DL (ref 0.76–1.46)
TIBC SERPL-MCNC: 283 UG/DL (ref 240–430)
TSH SERPL DL<=0.005 MIU/L-ACNC: <0.01 MU/L (ref 0.4–4)

## 2019-07-24 ASSESSMENT — ANXIETY QUESTIONNAIRES: GAD7 TOTAL SCORE: 10

## 2019-07-25 NOTE — TELEPHONE ENCOUNTER
I can see her tomorrow at 11:30 am - can you call and see if she wants to come in?  If she can't make that appointment, will have to route back to Dr. Gutiérrez's staff for add on as I am out of the office for two weeks after Friday.  Zenaida Lemus NP  Endocrinology  .

## 2019-07-25 NOTE — TELEPHONE ENCOUNTER
Patient is symptomatic with hyperthyroidism.    Will see if Dr. Howardkar able to see her sooner than October.    Then let patient know when she can be seen.    Thanks!

## 2019-07-25 NOTE — TELEPHONE ENCOUNTER
Will place on cancel list and route to Zenaida Lemus.    Janeth Rai CMA  [facility]  [facility]

## 2019-07-25 NOTE — TELEPHONE ENCOUNTER
Endo staff- can you please take a look into this?  Please check schedule to see if we have sooner appointment/cancellations in next few weeks.  If not please discuss with me about possible add- on.- needs to be seen sooner.  Can see Zenaida Lemus at [place] ([phone]) if she has earlier openings.      Let me know if you have any questions.    Thank you.    Iraida Gutiérrez

## 2019-07-25 NOTE — TELEPHONE ENCOUNTER
Left messages at both home and cell  for patient to return phone call to the [facility].      Alicja Moreno CMA on 7/25/2019 at 6:56 PM

## 2019-07-26 NOTE — TELEPHONE ENCOUNTER
Called and LMOM at home # to return my call.  Called cell #, previous message left.  Alicja Moreno CMA on 7/26/2019 at 8:41 AM

## 2019-07-26 NOTE — TELEPHONE ENCOUNTER
"Spoke with patient.  Patient states she is not able to come in today.  States \"I work and need more notice\".  Scheduled future appointment with Dr. Gutiérrez for 11/06/19.  Patient will wait to hear back from Dr. Gutiérrez's team next week regarding work in appointment as requested by Dr. Morse below.  Routing back to Dr. Gutiérrez's team per Zenaida Lemus's recommendation below to contact patient for work in appointment.  Patient is hoping to do a phone visit if possible as she states she has a lot of medical bills that are piling up from being so sick.      Dr. Gutiérrez's team - please contact patient next week for possible work in appointment as per below documentation.  Alicja Moreno CMA on 7/26/2019 at 9:00 AM  "

## 2019-07-29 ENCOUNTER — OFFICE VISIT (OUTPATIENT)
Dept: ENDOCRINOLOGY | Facility: CLINIC | Age: 54
End: 2019-07-29
Payer: COMMERCIAL

## 2019-07-29 VITALS
BODY MASS INDEX: 22.23 KG/M2 | OXYGEN SATURATION: 97 % | SYSTOLIC BLOOD PRESSURE: 109 MMHG | TEMPERATURE: 97.6 F | RESPIRATION RATE: 16 BRPM | DIASTOLIC BLOOD PRESSURE: 66 MMHG | HEIGHT: 66 IN | WEIGHT: 138.3 LBS | HEART RATE: 95 BPM

## 2019-07-29 DIAGNOSIS — E05.90 HYPERTHYROIDISM: Primary | ICD-10-CM

## 2019-07-29 PROCEDURE — 84439 ASSAY OF FREE THYROXINE: CPT | Performed by: INTERNAL MEDICINE

## 2019-07-29 PROCEDURE — 99000 SPECIMEN HANDLING OFFICE-LAB: CPT | Performed by: INTERNAL MEDICINE

## 2019-07-29 PROCEDURE — 84481 FREE ASSAY (FT-3): CPT | Performed by: INTERNAL MEDICINE

## 2019-07-29 PROCEDURE — 99214 OFFICE O/P EST MOD 30 MIN: CPT | Performed by: INTERNAL MEDICINE

## 2019-07-29 PROCEDURE — 36415 COLL VENOUS BLD VENIPUNCTURE: CPT | Performed by: INTERNAL MEDICINE

## 2019-07-29 PROCEDURE — 84445 ASSAY OF TSI GLOBULIN: CPT | Mod: 90 | Performed by: INTERNAL MEDICINE

## 2019-07-29 PROCEDURE — 84443 ASSAY THYROID STIM HORMONE: CPT | Performed by: INTERNAL MEDICINE

## 2019-07-29 ASSESSMENT — MIFFLIN-ST. JEOR: SCORE: 1239.07

## 2019-07-29 NOTE — PROGRESS NOTES
Name: Kari Christian  Seen for f/u of  Hyperthyroidism.  Last visit 4/2019 but was sick and had to go to ER.  HPI:  Kari Christian is a 53 year old female who presents for the evaluation of Hyperthyroidism.    H/o subclinical hyperthyroidism since 2004.  In 3/2019 labs labs c/w hyperthyroidism with suppressed TSH and high FT4.  Feeling tired.  Problems with hearing.  Problems with concentration.  Sometimes feels tingling sensation.  History of radiation exposure: NO  History of thyroid dysfunction: h/o subclinical hyperthyroidism-- was not on medication.  Palpitations:  On and off X ongoing 2 years.  Changes to hair or skin: + dry skin. + hair loss.  Diarrhea/Constipation: h/o IBS.  Tremors: occaionally  Periods: s/p menopause in 40s  Vision problems: on and off blurry vision X 1 year. Wears glasses.   FH: sister: hyperthyroidism  Weight: stable.  Wt Readings from Last 2 Encounters:   07/29/19 62.7 kg (138 lb 4.8 oz)   07/23/19 61.7 kg (136 lb)         PMH/PSH:  Past Medical History:   Diagnosis Date     Anemia, unspecified     normal is 7-10     Hereditary spherocytosis (H)     no past transfusion;      Major depressive disorder, single episode, mild (H)      Thyroid disease      Past Surgical History:   Procedure Laterality Date     CHOLECYSTECTOMY  2007     COLONOSCOPY  09/2014     COLONOSCOPY  01/15/2018    Dr. Wade [facility]     ESOPHAGOSCOPY, GASTROSCOPY, DUODENOSCOPY (EGD), COMBINED N/A 1/15/2018    Procedure: COMBINED ESOPHAGOSCOPY, GASTROSCOPY, DUODENOSCOPY (EGD), BIOPSY SINGLE OR MULTIPLE;  ESOPHAGOSCOPY, GASTROSCOPY, DUODENOSCOPY (EGD) with cold biopsies of duodenum and  COLONOSCOPY with polypectomy;  Surgeon: Chad Wade MD;  Location:  GI     HEAD & NECK SURGERY      wisdom teeth removed     Family Hx:  Family History   Problem Relation Age of Onset     Hypertension Mother      Arthritis Mother      Respiratory Mother         Emphysema     Gastrointestinal Disease Mother      Circulatory Mother       LEONAAAMNA Father 59     Hypertension Father      Cancer Father         Esophageal     Gastrointestinal Disease Father         Gallstones, Gallbladder removal, Colostomy, diverticulitis     Cancer Maternal Grandfather         Kidney     Cancer Paternal Grandfather         Lung, smoker     Breast Cancer Sister      Thyroid Disease Sister      Cancer - colorectal No family hx of      Colon Cancer No family hx of              Social Hx:  Social History     Socioeconomic History     Marital status:      Spouse name: Not on file     Number of children: Not on file     Years of education: Not on file     Highest education level: Not on file   Occupational History     Not on file   Social Needs     Financial resource strain: Not on file     Food insecurity:     Worry: Not on file     Inability: Not on file     Transportation needs:     Medical: Not on file     Non-medical: Not on file   Tobacco Use     Smoking status: Current Every Day Smoker     Packs/day: 0.30     Years: 20.00     Pack years: 6.00     Types: Cigarettes     Smokeless tobacco: Never Used   Substance and Sexual Activity     Alcohol use: Yes     Alcohol/week: 1.2 - 1.8 oz     Types: 2 - 3 Standard drinks or equivalent per week     Comment: socially     Drug use: No     Sexual activity: Yes     Partners: Male     Comment: none   Lifestyle     Physical activity:     Days per week: Not on file     Minutes per session: Not on file     Stress: Not on file   Relationships     Social connections:     Talks on phone: Not on file     Gets together: Not on file     Attends Worship service: Not on file     Active member of club or organization: Not on file     Attends meetings of clubs or organizations: Not on file     Relationship status: Not on file     Intimate partner violence:     Fear of current or ex partner: Not on file     Emotionally abused: Not on file     Physically abused: Not on file     Forced sexual activity: Not on file   Other Topics Concern  "    Parent/sibling w/ CABG, MI or angioplasty before 65F 55M? Not Asked   Social History Narrative     Not on file          MEDICATIONS:  has a current medication list which includes the following prescription(s): albuterol, folic acid, ibuprofen, and omeprazole.    ROS   ROS: 10 point ROS neg other than the symptoms noted above in the HPI.    Physical Exam   VS: /66 (BP Location: Right arm, Patient Position: Chair, Cuff Size: Adult Regular)   Pulse 95   Temp 97.6  F (36.4  C) (Oral)   Resp 16   Ht 1.676 m (5' 6\")   Wt 62.7 kg (138 lb 4.8 oz)   LMP 04/28/2009   SpO2 97%   Breastfeeding? No   BMI 22.32 kg/m    GENERAL: AXOX3, NAD, well dressed, answering questions appropriately, appears stated age.  HEENT: No exopthalmous, no proptosis, EOMI, no lig lag, no retraction  NECK: Thyroid normal in size, non tender, no nodules were palpated.  CV: RRR  LUNGS: CTAB  ABDOMEN: +BS  NEUROLOGY: CN grossly intact, no tremors  PSYCH: normal affect and mood      LABS:  TFTs:  ENDO THYROID LABS-[facility] Latest Ref Rng & Units 7/23/2019 4/30/2019   TSH 0.40 - 4.00 mU/L <0.01 (L) <0.01 (L)   T4 FREE 0.76 - 1.46 ng/dL 2.21 (H) 2.78 (H)   FREE T3 2.3 - 4.2 pg/mL     TRIIODOTHYRONINE(T3) 60 - 181 ng/dL 244 (H)      ENDO THYROID LABS-[facility] Latest Ref Rng & Units 3/22/2019 8/29/2017   TSH 0.40 - 4.00 mU/L <0.01 (L) 0.33 (L)   T4 FREE 0.76 - 1.46 ng/dL 2.27 (H) 1.25       CBC:  WBC   Date Value Ref Range Status   07/23/2019 6.0 4.0 - 11.0 10e9/L Final       LFTs:  Liver Function Studies -   Recent Labs   Lab Test 03/22/19  0942   PROTTOTAL 6.9   ALBUMIN 3.9   BILITOTAL 3.1*   ALKPHOS 76   AST 24   ALT 27         All pertinent notes, labs, and images personally reviewed by me.     A/P  Ms.Kari J Gino is a 53 year old here for the evaluation of hyperthyroidism.    Hyperthyroidism:  Differential for hyperthyroidism includes:  Graves' disease, thyroiditis, or autonomous hyperfunctioning nodule.  An uptake and scan of her thyroid gland " will help differentiate the diagnosis.  In Graves' disease her uptake will be homogeneous and increased, in thyroiditis her uptake will be low, and in an autonomous hyperfunctioing nodule the uptake will be increased in a focal area.    History of long-standing subclinical hyperthyroidism.  Was not on medication in past  Labs from March 2019 consistent with hyperthyroidism with suppressed TSH and high free T4  Plan was to get repeat labs along with screening for Graves' disease and get NM scan  Discussed starting beta-blocker but heart rate is okay and clinically she is asymptomatic so will hold off  She is a smoker, no ophthalmopathy.  Discussed medication management for Graves' disease briefly including medication versus radioactive iodine ablation  Follow-up after labs and scan.    Plan: TSH, T3 Free, T4 free, Thyroid stimulating         immunoglobulin, NM Thyroid Uptake and Scan         Follow-up:  After above.      Iraida Gutiérrez MD  Endocrinology  [facility]/[place]  CC: Brenna Morse     More than 50% of face to face time spent with Ms. Christian on counseling / coordinating her care.      All questions were answered.  The patient indicates understanding of the above issues and agrees with the plan set forth.     Addendum to above note and clinic visit:    Labs reviewed.    See result note/telephone encounter.

## 2019-07-29 NOTE — NURSING NOTE
ENDOCRINOLOGY INTAKE FORM    Patient Name:  Kari Christian  :  1965    Is patient Diabetic?   No  Does patient have non-diabetic or other endocrine issues?  Yes: hyperthyroidism    Vitals: LMP 2009   BMI= There is no height or weight on file to calculate BMI.      Smoking and Alcohol use:  Social History     Tobacco Use     Smoking status: Current Every Day Smoker     Packs/day: 0.30     Years: 20.00     Pack years: 6.00     Types: Cigarettes     Smokeless tobacco: Never Used   Substance Use Topics     Alcohol use: Yes     Alcohol/week: 1.2 - 1.8 oz     Types: 2 - 3 Standard drinks or equivalent per week     Comment: socially     Drug use: No       Janeth Rai, ALESHIA  [facility]  [facility]

## 2019-07-29 NOTE — TELEPHONE ENCOUNTER
I left a message for the patient to return our call.     Janeth Rai, ALESHIA  [facility]  [facility]

## 2019-07-29 NOTE — PATIENT INSTRUCTIONS
[facility]   Dr Gutiérrez, Endocrinology Department      [facility]   [address]  [place] [address]  Appointment [phone]  Fax: [phone]  [place]: Monday and Tuesday         [facility]   [address]  [place] [address]  Appointment [phone]  Fax: [phone]  [place]: Wednesday and Thursday          Labs today.  Follow up with radiology for NM thyroid uptake and scan  Follow up after that.  Check cost with insurance.     [facility] radiology scheduleing   [place]  [phone]   [facility] Radiology scheduling  [place]  [phone]

## 2019-07-29 NOTE — TELEPHONE ENCOUNTER
Patient returned call. Offered 4pm work-in appt. Was unsure if she could make this appt on short notice and  also expressed concerned about cost of OV. Advised [employer] line phone number and patient asked me to reserve the appt. She will call back to cancel the appt if she is not able to come in this afternoon.    GAYLE 7/29/19 1115pm

## 2019-07-29 NOTE — LETTER
7/29/2019         RE: Kari Christian  [address] [place] [address]        Dear Colleague,    [facility]. Please see a copy of my visit note below.    Name: Kari Christian  Seen for f/u of  Hyperthyroidism.  Last visit 4/2019 but was sick and had to go to ER.  HPI:  Kari Christian is a 53 year old female who presents for the evaluation of Hyperthyroidism.    H/o subclinical hyperthyroidism since 2004.  In 3/2019 labs labs c/w hyperthyroidism with suppressed TSH and high FT4.  Feeling tired.  Problems with hearing.  Problems with concentration.  Sometimes feels tingling sensation.  History of radiation exposure: NO  History of thyroid dysfunction: h/o subclinical hyperthyroidism-- was not on medication.  Palpitations:  On and off X ongoing 2 years.  Changes to hair or skin: + dry skin. + hair loss.  Diarrhea/Constipation: h/o IBS.  Tremors: occaionally  Periods: s/p menopause in 40s  Vision problems: on and off blurry vision X 1 year. Wears glasses.   FH: sister: hyperthyroidism  Weight: stable.  Wt Readings from Last 2 Encounters:   07/29/19 62.7 kg (138 lb 4.8 oz)   07/23/19 61.7 kg (136 lb)         PMH/PSH:  Past Medical History:   Diagnosis Date     Anemia, unspecified     normal is 7-10     Hereditary spherocytosis (H)     no past transfusion;      Major depressive disorder, single episode, mild (H)      Thyroid disease      Past Surgical History:   Procedure Laterality Date     CHOLECYSTECTOMY  2007     COLONOSCOPY  09/2014     COLONOSCOPY  01/15/2018    Dr. Wade [facility]     ESOPHAGOSCOPY, GASTROSCOPY, DUODENOSCOPY (EGD), COMBINED N/A 1/15/2018    Procedure: COMBINED ESOPHAGOSCOPY, GASTROSCOPY, DUODENOSCOPY (EGD), BIOPSY SINGLE OR MULTIPLE;  ESOPHAGOSCOPY, GASTROSCOPY, DUODENOSCOPY (EGD) with cold biopsies of duodenum and  COLONOSCOPY with polypectomy;  Surgeon: Chad Wade MD;  Location:  GI     HEAD & NECK SURGERY       wisdom teeth removed     Family Hx:  Family History   Problem Relation Age of Onset     Hypertension Mother      Arthritis Mother      Respiratory Mother         Emphysema     Gastrointestinal Disease Mother      Circulatory Mother      C.A.D. Father 59     Hypertension Father      Cancer Father         Esophageal     Gastrointestinal Disease Father         Gallstones, Gallbladder removal, Colostomy, diverticulitis     Cancer Maternal Grandfather         Kidney     Cancer Paternal Grandfather         Lung, smoker     Breast Cancer Sister      Thyroid Disease Sister      Cancer - colorectal No family hx of      Colon Cancer No family hx of              Social Hx:  Social History     Socioeconomic History     Marital status:      Spouse name: Not on file     Number of children: Not on file     Years of education: Not on file     Highest education level: Not on file   Occupational History     Not on file   Social Needs     Financial resource strain: Not on file     Food insecurity:     Worry: Not on file     Inability: Not on file     Transportation needs:     Medical: Not on file     Non-medical: Not on file   Tobacco Use     Smoking status: Current Every Day Smoker     Packs/day: 0.30     Years: 20.00     Pack years: 6.00     Types: Cigarettes     Smokeless tobacco: Never Used   Substance and Sexual Activity     Alcohol use: Yes     Alcohol/week: 1.2 - 1.8 oz     Types: 2 - 3 Standard drinks or equivalent per week     Comment: socially     Drug use: No     Sexual activity: Yes     Partners: Male     Comment: none   Lifestyle     Physical activity:     Days per week: Not on file     Minutes per session: Not on file     Stress: Not on file   Relationships     Social connections:     Talks on phone: Not on file     Gets together: Not on file     Attends [employer] service: Not on file     Active member of club or organization: Not on file     Attends meetings of clubs or organizations: Not on file     Relationship  "status: Not on file     Intimate partner violence:     Fear of current or ex partner: Not on file     Emotionally abused: Not on file     Physically abused: Not on file     Forced sexual activity: Not on file   Other Topics Concern     Parent/sibling w/ CABG, MI or angioplasty before 65F 55M? Not Asked   Social History Narrative     Not on file          MEDICATIONS:  has a current medication list which includes the following prescription(s): albuterol, folic acid, ibuprofen, and omeprazole.    ROS   ROS: 10 point ROS neg other than the symptoms noted above in the HPI.    Physical Exam   VS: /66 (BP Location: Right arm, Patient Position: Chair, Cuff Size: Adult Regular)   Pulse 95   Temp 97.6  F (36.4  C) (Oral)   Resp 16   Ht 1.676 m (5' 6\")   Wt 62.7 kg (138 lb 4.8 oz)   LMP 04/28/2009   SpO2 97%   Breastfeeding? No   BMI 22.32 kg/m     GENERAL: AXOX3, NAD, well dressed, answering questions appropriately, appears stated age.  HEENT: No exopthalmous, no proptosis, EOMI, no lig lag, no retraction  NECK: Thyroid normal in size, non tender, no nodules were palpated.  CV: RRR  LUNGS: CTAB  ABDOMEN: +BS  NEUROLOGY: CN grossly intact, no tremors  PSYCH: normal affect and mood      LABS:  TFTs:  ENDO THYROID LABS-[facility] Latest Ref Rng & Units 7/23/2019 4/30/2019   TSH 0.40 - 4.00 mU/L <0.01 (L) <0.01 (L)   T4 FREE 0.76 - 1.46 ng/dL 2.21 (H) 2.78 (H)   FREE T3 2.3 - 4.2 pg/mL     TRIIODOTHYRONINE(T3) 60 - 181 ng/dL 244 (H)      ENDO THYROID LABS-[facility] Latest Ref Rng & Units 3/22/2019 8/29/2017   TSH 0.40 - 4.00 mU/L <0.01 (L) 0.33 (L)   T4 FREE 0.76 - 1.46 ng/dL 2.27 (H) 1.25       CBC:  WBC   Date Value Ref Range Status   07/23/2019 6.0 4.0 - 11.0 10e9/L Final       LFTs:  Liver Function Studies -   Recent Labs   Lab Test 03/22/19  0942   PROTTOTAL 6.9   ALBUMIN 3.9   BILITOTAL 3.1*   ALKPHOS 76   AST 24   ALT 27         All pertinent notes, labs, and images personally reviewed by me.     A/P  Ms.Kari Christian is " a 53 year old here for the evaluation of hyperthyroidism.    Hyperthyroidism:  Differential for hyperthyroidism includes:  Graves' disease, thyroiditis, or autonomous hyperfunctioning nodule.  An uptake and scan of her thyroid gland will help differentiate the diagnosis.  In Graves' disease her uptake will be homogeneous and increased, in thyroiditis her uptake will be low, and in an autonomous hyperfunctioing nodule the uptake will be increased in a focal area.    History of long-standing subclinical hyperthyroidism.  Was not on medication in past  Labs from March 2019 consistent with hyperthyroidism with suppressed TSH and high free T4  Plan was to get repeat labs along with screening for Graves' disease and get NM scan  Discussed starting beta-blocker but heart rate is okay and clinically she is asymptomatic so will hold off  She is a smoker, no ophthalmopathy.  Discussed medication management for Graves' disease briefly including medication versus radioactive iodine ablation  Follow-up after labs and scan.    Plan: TSH, T3 Free, T4 free, Thyroid stimulating         immunoglobulin, NM Thyroid Uptake and Scan         Follow-up:  After above.      Iraida Gutiérrez MD  Endocrinology  [facility]/[place]  CC: Brenna Morse     More than 50% of face to face time spent with Ms. Christian on counseling / coordinating her care.      All questions were answered.  The patient indicates understanding of the above issues and agrees with the plan set forth.     Addendum to above note and clinic visit:    Labs reviewed.    See result note/telephone encounter.            Again, thank you for allowing me to participate in the care of your patient.        Sincerely,        Iraida Gutiérrez MD

## 2019-07-30 ENCOUNTER — TELEPHONE (OUTPATIENT)
Dept: INTERNAL MEDICINE | Facility: CLINIC | Age: 54
End: 2019-07-30

## 2019-07-30 LAB
T3FREE SERPL-MCNC: 7.5 PG/ML (ref 2.3–4.2)
T4 FREE SERPL-MCNC: 2.48 NG/DL (ref 0.76–1.46)
TSH SERPL DL<=0.005 MIU/L-ACNC: <0.01 MU/L (ref 0.4–4)

## 2019-07-30 NOTE — TELEPHONE ENCOUNTER
Is it necessary or can she try something less expensive?    Janeth Rai, ALESHIA  [facility]  [facility]

## 2019-07-30 NOTE — TELEPHONE ENCOUNTER
Reason for Call:  Other call back    Detailed comments: Patient called asking if the thyroid uptake and scan are necessary or if there is another option due to the cost of the procedure. She is wondering if just having an ultra sound is an option or just going on medication. Please call her back to advise. OK to leave detailed message on voicemail.    Phone Number Patient can be reached at: Cell number on file:    Telephone Information:   Mobile [phone]       Best Time: any    Can we leave a detailed message on this number? YES    Call taken on 7/30/2019 at 10:28 AM by Fabby Rosario

## 2019-08-02 ENCOUNTER — TELEPHONE (OUTPATIENT)
Dept: ENDOCRINOLOGY | Facility: CLINIC | Age: 54
End: 2019-08-02

## 2019-08-03 NOTE — TELEPHONE ENCOUNTER
Component      Latest Ref Rng & Units 7/29/2019   TSH      0.40 - 4.00 mU/L <0.01 (L)   Free T3      2.3 - 4.2 pg/mL 7.5 (H)   T4 Free      0.76 - 1.46 ng/dL 2.48 (H)     Labs c/w hyperthyroidism.  TSI still pending.  Recommended NM scan.  Endo staff- please needs to be seen in clinic after above. Ok to add on.

## 2019-08-05 ENCOUNTER — TELEPHONE (OUTPATIENT)
Dept: INTERNAL MEDICINE | Facility: CLINIC | Age: 54
End: 2019-08-05

## 2019-08-05 NOTE — TELEPHONE ENCOUNTER
Reason for Call:  Request for results:    Name of test or procedure: labs    Date of test of procedure: 7/29/19    Location of the test or procedure: here at [facility]    OK to leave the result message on voice mail or with a family member? YES    Phone number Patient can be reached at:  Cell number on file:    Telephone Information:   Mobile [phone]       Additional comments: Pt requests a nurse to call back with results of recent lab work done for Dr. Gutiérrez.    Call taken on 8/5/2019 at 1:51 PM by Sobeida Gutiérrez

## 2019-08-05 NOTE — TELEPHONE ENCOUNTER
Patient notified.  She is concerned about cost and will check into it.  I don't see that it's ordered?    Janeth Rai, [facility]  [place]

## 2019-08-08 ENCOUNTER — HOSPITAL ENCOUNTER (OUTPATIENT)
Dept: NUCLEAR MEDICINE | Facility: CLINIC | Age: 54
Setting detail: NUCLEAR MEDICINE
Discharge: HOME | End: 2019-08-08
Attending: INTERNAL MEDICINE | Admitting: INTERNAL MEDICINE
Payer: COMMERCIAL

## 2019-08-08 DIAGNOSIS — E05.90 HYPERTHYROIDISM: ICD-10-CM

## 2019-08-08 LAB — TSI SER-ACNC: 1.1 TSI INDEX

## 2019-08-08 PROCEDURE — 78014 THYROID IMAGING W/BLOOD FLOW: CPT

## 2019-08-08 PROCEDURE — 34300033 ZZH RX 343: Performed by: INTERNAL MEDICINE

## 2019-08-08 PROCEDURE — A9516 IODINE I-123 SOD IODIDE MIC: HCPCS | Performed by: INTERNAL MEDICINE

## 2019-08-08 RX ADMIN — Medication 237.8 UCI.: at 13:34

## 2019-08-09 ENCOUNTER — HOSPITAL ENCOUNTER (OUTPATIENT)
Dept: NUCLEAR MEDICINE | Facility: CLINIC | Age: 54
Setting detail: NUCLEAR MEDICINE
End: 2019-08-09
Attending: INTERNAL MEDICINE
Payer: COMMERCIAL

## 2019-08-12 ENCOUNTER — MYC MEDICAL ADVICE (OUTPATIENT)
Dept: ENDOCRINOLOGY | Facility: CLINIC | Age: 54
End: 2019-08-12

## 2019-08-13 NOTE — TELEPHONE ENCOUNTER
Can start medication in clinic visit.  I need to go over the medications s/e and monitoring with pt.  Its very important that she gets treatment for hyperthyroidism.

## 2019-08-13 NOTE — TELEPHONE ENCOUNTER
Offer 1 of the chart reviews for 08/15 in [place].    I left a message for the patient to return our call.     Janeth Rai CMA  [facility]  [place]

## 2019-08-13 NOTE — TELEPHONE ENCOUNTER
She has $10,000 of medical bills and just wants to know if she should go on meds?  She would prefer meds to the radiation treatment.  She would like to prevent another ov.    PATIENT DOES NOT CHECK MY CHART.    Results for orders placed or performed during the hospital encounter of 08/08/19   NM Thyroid Uptake and Scan    Narrative    NUCLEAR MEDICINE THYROID UPTAKE/SCAN  8/9/2019 2:04 PM     HISTORY:  Hyperthyroidism.    COMPARISON:  None.    TECHNIQUE:  Patient was given I-123 orally followed by 24-hour uptake  and scan.    DOSE:  237.8 uCi I-123    FINDINGS:  Thyroid gland  appears relatively normal in size and  homogeneous in uptake. 24-hour uptake was calculated at 46.6% which is  above the normal range.    Normal range is 10-30% 24-hour uptake.      Impression    IMPRESSION:  Elevated thyroid uptake with an anatomically  normal-appearing scan.    MD Janeth HENRY, [facility]  [place]

## 2019-08-15 NOTE — TELEPHONE ENCOUNTER
Offer 08/20 at 11am in [place].    I left a message for the patient to return our call.     Janeth Rai CMA  [facility]  [facility]/[place]

## 2019-08-15 NOTE — TELEPHONE ENCOUNTER
Pt called and is not able to make it in today and would like next week. Please call pt back detailed message ok.

## 2019-08-20 ENCOUNTER — OFFICE VISIT (OUTPATIENT)
Dept: ENDOCRINOLOGY | Facility: CLINIC | Age: 54
End: 2019-08-20
Payer: COMMERCIAL

## 2019-08-20 VITALS
RESPIRATION RATE: 16 BRPM | OXYGEN SATURATION: 96 % | HEIGHT: 66 IN | WEIGHT: 137.6 LBS | DIASTOLIC BLOOD PRESSURE: 69 MMHG | HEART RATE: 96 BPM | BODY MASS INDEX: 22.11 KG/M2 | SYSTOLIC BLOOD PRESSURE: 97 MMHG | TEMPERATURE: 97.6 F

## 2019-08-20 DIAGNOSIS — E05.90 HYPERTHYROIDISM: Primary | ICD-10-CM

## 2019-08-20 PROCEDURE — 99214 OFFICE O/P EST MOD 30 MIN: CPT | Performed by: INTERNAL MEDICINE

## 2019-08-20 RX ORDER — METHIMAZOLE 10 MG/1
10 TABLET ORAL 2 TIMES DAILY
Qty: 60 TABLET | Refills: 3 | Status: SHIPPED | OUTPATIENT
Start: 2019-08-20 | End: 2019-09-20

## 2019-08-20 ASSESSMENT — MIFFLIN-ST. JEOR: SCORE: 1235.9

## 2019-08-20 NOTE — PROGRESS NOTES
Name: Kari Christian  Seen for f/u of  Hyperthyroidism.    HPI:  Kari Christian is a 53 year old female who presents for the evaluation of Hyperthyroidism.  TSI neg. NM scan high uptake at 46.6% c/w Graves disease.  H/o Subclinical hyperthyroidism since 2004.  In 3/2019 labs labs c/w hyperthyroidism with suppressed TSH and high FT4.  Feeling tired.  Problems with concentration.  Sometimes feels tingling sensation.  History of radiation exposure: NO  History of thyroid dysfunction: h/o subclinical hyperthyroidism-- was not on medication.  Palpitations:  On and off X Ongoing 2 years.  Changes to hair or skin: + dry skin. + hair loss.  Diarrhea/Constipation: h/o IBS.  Tremors: occaionally  Periods: s/p menopause in 40s  Vision problems: on and off blurry vision X 1 year. Wears glasses.   FH: sister: hyperthyroidism  Weight: stable.  Wt Readings from Last 2 Encounters:   08/20/19 62.4 kg (137 lb 9.6 oz)   07/29/19 62.7 kg (138 lb 4.8 oz)     PMH/PSH:  Past Medical History:   Diagnosis Date     Anemia, unspecified     normal is 7-10     Hereditary spherocytosis (H)     no past transfusion;      Major depressive disorder, single episode, mild (H)      Thyroid disease      Past Surgical History:   Procedure Laterality Date     CHOLECYSTECTOMY  2007     COLONOSCOPY  09/2014     COLONOSCOPY  01/15/2018    Dr. Wade Count includes the [facility]     ESOPHAGOSCOPY, GASTROSCOPY, DUODENOSCOPY (EGD), COMBINED N/A 1/15/2018    Procedure: COMBINED ESOPHAGOSCOPY, GASTROSCOPY, DUODENOSCOPY (EGD), BIOPSY SINGLE OR MULTIPLE;  ESOPHAGOSCOPY, GASTROSCOPY, DUODENOSCOPY (EGD) with cold biopsies of duodenum and  COLONOSCOPY with polypectomy;  Surgeon: Chad Wade MD;  Location:  GI     HEAD & NECK SURGERY      wisdom teeth removed     Family Hx:  Family History   Problem Relation Age of Onset     Hypertension Mother      Arthritis Mother      Respiratory Mother         Emphysema     Gastrointestinal Disease Mother      Circulatory Mother      C.A.D. Father  59     Hypertension Father      Cancer Father         Esophageal     Gastrointestinal Disease Father         Gallstones, Gallbladder removal, Colostomy, diverticulitis     Cancer Maternal Grandfather         Kidney     Cancer Paternal Grandfather         Lung, smoker     Breast Cancer Sister      Thyroid Disease Sister      Cancer - colorectal No family hx of      Colon Cancer No family hx of              Social Hx:  Social History     Socioeconomic History     Marital status:      Spouse name: Not on file     Number of children: Not on file     Years of education: Not on file     Highest education level: Not on file   Occupational History     Not on file   Social Needs     Financial resource strain: Not on file     Food insecurity:     Worry: Not on file     Inability: Not on file     Transportation needs:     Medical: Not on file     Non-medical: Not on file   Tobacco Use     Smoking status: Current Every Day Smoker     Packs/day: 0.30     Years: 20.00     Pack years: 6.00     Types: Cigarettes     Smokeless tobacco: Never Used   Substance and Sexual Activity     Alcohol use: Yes     Alcohol/week: 1.2 - 1.8 oz     Types: 2 - 3 Standard drinks or equivalent per week     Comment: socially     Drug use: No     Sexual activity: Yes     Partners: Male     Comment: none   Lifestyle     Physical activity:     Days per week: Not on file     Minutes per session: Not on file     Stress: Not on file   Relationships     Social connections:     Talks on phone: Not on file     Gets together: Not on file     Attends Hinduism service: Not on file     Active member of club or organization: Not on file     Attends meetings of clubs or organizations: Not on file     Relationship status: Not on file     Intimate partner violence:     Fear of current or ex partner: Not on file     Emotionally abused: Not on file     Physically abused: Not on file     Forced sexual activity: Not on file   Other Topics Concern     Parent/sibling  "w/ CABG, MI or angioplasty before 65F 55M? Not Asked   Social History Narrative     Not on file          MEDICATIONS:  has a current medication list which includes the following prescription(s): folic acid and albuterol.    ROS   ROS: 10 point ROS neg other than the symptoms noted above in the HPI.    Physical Exam   VS: BP 97/69 (BP Location: Right arm, Patient Position: Chair, Cuff Size: Adult Regular)   Pulse 96   Temp 97.6  F (36.4  C) (Oral)   Resp 16   Ht 1.676 m (5' 6\")   Wt 62.4 kg (137 lb 9.6 oz)   LMP 04/28/2009   SpO2 96%   Breastfeeding? No   BMI 22.21 kg/m    GENERAL: AXOX3, NAD, well dressed, answering questions appropriately, appears stated age.  HEENT: No exopthalmous, no proptosis, EOMI, no lig lag, no retraction  NECK: Thyroid normal in size, non tender, no nodules were palpated.  CV: RRR  LUNGS: CTAB  ABDOMEN: +BS  NEUROLOGY: CN grossly intact, no tremors  PSYCH: normal affect and mood      LABS:  TFTs:  ENDO THYROID LABS-[facility] Latest Ref Rng & Units 7/23/2019 4/30/2019   TSH 0.40 - 4.00 mU/L <0.01 (L) <0.01 (L)   T4 FREE 0.76 - 1.46 ng/dL 2.21 (H) 2.78 (H)   FREE T3 2.3 - 4.2 pg/mL     TRIIODOTHYRONINE(T3) 60 - 181 ng/dL 244 (H)      ENDO THYROID LABS-[facility] Latest Ref Rng & Units 3/22/2019 8/29/2017   TSH 0.40 - 4.00 mU/L <0.01 (L) 0.33 (L)   T4 FREE 0.76 - 1.46 ng/dL 2.27 (H) 1.25       CBC:  WBC   Date Value Ref Range Status   07/23/2019 6.0 4.0 - 11.0 10e9/L Final       LFTs:  Liver Function Studies -   Recent Labs   Lab Test 07/23/19  1557   PROTTOTAL 6.9   ALBUMIN 3.8   BILITOTAL 2.9*   ALKPHOS 90   AST 15   ALT 22       NUCLEAR MEDICINE THYROID UPTAKE/SCAN  8/9/2019 2:04 PM      HISTORY:  Hyperthyroidism.     COMPARISON:  None.     TECHNIQUE:  Patient was given I-123 orally followed by 24-hour uptake  and scan.     DOSE:  237.8 uCi I-123     FINDINGS:  Thyroid gland  appears relatively normal in size and  homogeneous in uptake. 24-hour uptake was calculated at 46.6% which is  above the " normal range.     Normal range is 10-30% 24-hour uptake.                                                                      IMPRESSION:  Elevated thyroid uptake with an anatomically  normal-appearing scan.  All pertinent notes, labs, and images personally reviewed by me.     A/P  Ms.Sarah ROWDY Christian is a 53 year old here for the evaluation of hyperthyroidism.    Hyperthyroidism ( high uptake on scan, neg TSI):    Discussed diagnosis, pathophysiology, management and treatment options of condition with pt.  She is a smoker, no ophthalmopathy.  Discussed medication management for Graves' disease including medication versus radioactive iodine ablation  She would like to start medication.  Start methimazole 20 mg/day  Labs in 4-5 weeks  Follow-up after that  Also discussed beta-blocker but at this time she would like to hold off.  Heart rate in 90s.  Occasional palpitations likely 2/2 to anxiety related.  Of note she has history of Spherocytosis and LFT showed slightly elevated bilirubin.  Will continue to monitor.    Discussed indications, risks and benefits of all medications prescribed, and answered questions to patient's satisfaction.      Discussed possible side effects of methimazole including liver dysfunction and agranulocytosis. Discussed to watch for s/s like jaundice, abdominal pain and skin rash. In case of prolonged unresolving fever, sore throat and infections, advise to seek medical care.    Call if:     Signs or symptoms of infection. These include a fever of 100.5 degrees or higher, chills, severe sore throat, ear or sinus pain, cough, increased sputum or change in color, painful urination, mouth sores.   Severe nausea or vomiting.   Joint pain or swelling.   Not able to eat.   Unusual bruising or bleeding.   Dark urine or yellow skin or eyes.      Follow-up:  4-6 weeks.    Iraida Gutiérrez MD  Endocrinology  [facility]  CC: Brenna Morse     More than 50% of face to face time  spent with Ms. Christian on counseling / coordinating her care.      All questions were answered.  The patient indicates understanding of the above issues and agrees with the plan set forth.     Addendum to above note and clinic visit:    Labs reviewed.    See result note/telephone encounter.

## 2019-08-20 NOTE — PATIENT INSTRUCTIONS
[facility] & [place] locations   Dr Gutiérrez, Endocrinology Department      [facility]   [address]  [place] [address]  Appointment [phone]  Fax: [phone]  [place]: Monday and Tuesday         [facility]   [address]  [place] [address]  Appointment [phone]  Fax: [phone]  [place]: Wednesday and Thursday            Please check the cost coverage and copay with insurance before recommended tests, services and medications (especially if new medications are prescribed).     If ordered, please get blood work done 1 week prior to your next appointment so they will be available to Dr. Gutiérrez at your visit.    Start methimazole 10 mg twice a day  Lab sin 4-5 weeks.  Please make a lab appointment for blood work and follow up clinic appointment in 1 week after that to discuss results.    Discussed possible side effects of methimazole including liver dysfunction and agranulocytosis. Discussed to watch for s/s like jaundice, abdominal pain and skin rash. In case of prolonged unresolving fever, sore throat and infections, advise to seek medical care.    Call if:     Signs or symptoms of infection. These include a fever of 100.5 degrees or higher, chills, severe sore throat, ear or sinus pain, cough, increased sputum or change in color, painful urination, mouth sores.   Severe nausea or vomiting.   Joint pain or swelling.   Not able to eat.   Unusual bruising or bleeding.   Dark urine or yellow skin or eyes.

## 2019-08-20 NOTE — LETTER
8/20/2019         RE: Kari Christian  [address] [place] [address]        Dear Colleague,    [facility]. Please see a copy of my visit note below.    Name: Kari Christian  Seen for f/u of  Hyperthyroidism.    HPI:  Kari Christian is a 53 year old female who presents for the evaluation of Hyperthyroidism.  TSI neg. NM scan high uptake at 46.6% c/w Graves disease.  H/o Subclinical hyperthyroidism since 2004.  In 3/2019 labs labs c/w hyperthyroidism with suppressed TSH and high FT4.  Feeling tired.  Problems with concentration.  Sometimes feels tingling sensation.  History of radiation exposure: NO  History of thyroid dysfunction: h/o subclinical hyperthyroidism-- was not on medication.  Palpitations:  On and off X Ongoing 2 years.  Changes to hair or skin: + dry skin. + hair loss.  Diarrhea/Constipation: h/o IBS.  Tremors: occaionally  Periods: s/p menopause in 40s  Vision problems: on and off blurry vision X 1 year. Wears glasses.   FH: sister: hyperthyroidism  Weight: stable.  Wt Readings from Last 2 Encounters:   08/20/19 62.4 kg (137 lb 9.6 oz)   07/29/19 62.7 kg (138 lb 4.8 oz)     PMH/PSH:  Past Medical History:   Diagnosis Date     Anemia, unspecified     normal is 7-10     Hereditary spherocytosis (H)     no past transfusion;      Major depressive disorder, single episode, mild (H)      Thyroid disease      Past Surgical History:   Procedure Laterality Date     CHOLECYSTECTOMY  2007     COLONOSCOPY  09/2014     COLONOSCOPY  01/15/2018    Dr. Wade [facility]     ESOPHAGOSCOPY, GASTROSCOPY, DUODENOSCOPY (EGD), COMBINED N/A 1/15/2018    Procedure: COMBINED ESOPHAGOSCOPY, GASTROSCOPY, DUODENOSCOPY (EGD), BIOPSY SINGLE OR MULTIPLE;  ESOPHAGOSCOPY, GASTROSCOPY, DUODENOSCOPY (EGD) with cold biopsies of duodenum and  COLONOSCOPY with polypectomy;  Surgeon: Chad Wade MD;  Location:  GI     HEAD & NECK SURGERY      wisdom teeth  removed     Family Hx:  Family History   Problem Relation Age of Onset     Hypertension Mother      Arthritis Mother      Respiratory Mother         Emphysema     Gastrointestinal Disease Mother      Circulatory Mother      C.A.D. Father 59     Hypertension Father      Cancer Father         Esophageal     Gastrointestinal Disease Father         Gallstones, Gallbladder removal, Colostomy, diverticulitis     Cancer Maternal Grandfather         Kidney     Cancer Paternal Grandfather         Lung, smoker     Breast Cancer Sister      Thyroid Disease Sister      Cancer - colorectal No family hx of      Colon Cancer No family hx of              Social Hx:  Social History     Socioeconomic History     Marital status:      Spouse name: Not on file     Number of children: Not on file     Years of education: Not on file     Highest education level: Not on file   Occupational History     Not on file   Social Needs     Financial resource strain: Not on file     Food insecurity:     Worry: Not on file     Inability: Not on file     Transportation needs:     Medical: Not on file     Non-medical: Not on file   Tobacco Use     Smoking status: Current Every Day Smoker     Packs/day: 0.30     Years: 20.00     Pack years: 6.00     Types: Cigarettes     Smokeless tobacco: Never Used   Substance and Sexual Activity     Alcohol use: Yes     Alcohol/week: 1.2 - 1.8 oz     Types: 2 - 3 Standard drinks or equivalent per week     Comment: socially     Drug use: No     Sexual activity: Yes     Partners: Male     Comment: none   Lifestyle     Physical activity:     Days per week: Not on file     Minutes per session: Not on file     Stress: Not on file   Relationships     Social connections:     Talks on phone: Not on file     Gets together: Not on file     Attends Mandaeism service: Not on file     Active member of club or organization: Not on file     Attends meetings of clubs or organizations: Not on file     Relationship status: Not  "on file     Intimate partner violence:     Fear of current or ex partner: Not on file     Emotionally abused: Not on file     Physically abused: Not on file     Forced sexual activity: Not on file   Other Topics Concern     Parent/sibling w/ CABG, MI or angioplasty before 65F 55M? Not Asked   Social History Narrative     Not on file          MEDICATIONS:  has a current medication list which includes the following prescription(s): folic acid and albuterol.    ROS   ROS: 10 point ROS neg other than the symptoms noted above in the HPI.    Physical Exam   VS: BP 97/69 (BP Location: Right arm, Patient Position: Chair, Cuff Size: Adult Regular)   Pulse 96   Temp 97.6  F (36.4  C) (Oral)   Resp 16   Ht 1.676 m (5' 6\")   Wt 62.4 kg (137 lb 9.6 oz)   LMP 04/28/2009   SpO2 96%   Breastfeeding? No   BMI 22.21 kg/m     GENERAL: AXOX3, NAD, well dressed, answering questions appropriately, appears stated age.  HEENT: No exopthalmous, no proptosis, EOMI, no lig lag, no retraction  NECK: Thyroid normal in size, non tender, no nodules were palpated.  CV: RRR  LUNGS: CTAB  ABDOMEN: +BS  NEUROLOGY: CN grossly intact, no tremors  PSYCH: normal affect and mood      LABS:  TFTs:  ENDO THYROID LABS-[facility] Latest Ref Rng & Units 7/23/2019 4/30/2019   TSH 0.40 - 4.00 mU/L <0.01 (L) <0.01 (L)   T4 FREE 0.76 - 1.46 ng/dL 2.21 (H) 2.78 (H)   FREE T3 2.3 - 4.2 pg/mL     TRIIODOTHYRONINE(T3) 60 - 181 ng/dL 244 (H)      ENDO THYROID LABS-[facility] Latest Ref Rng & Units 3/22/2019 8/29/2017   TSH 0.40 - 4.00 mU/L <0.01 (L) 0.33 (L)   T4 FREE 0.76 - 1.46 ng/dL 2.27 (H) 1.25       CBC:  WBC   Date Value Ref Range Status   07/23/2019 6.0 4.0 - 11.0 10e9/L Final       LFTs:  Liver Function Studies -   Recent Labs   Lab Test 07/23/19  1557   PROTTOTAL 6.9   ALBUMIN 3.8   BILITOTAL 2.9*   ALKPHOS 90   AST 15   ALT 22       NUCLEAR MEDICINE THYROID UPTAKE/SCAN  8/9/2019 2:04 PM      HISTORY:  Hyperthyroidism.     COMPARISON:  None.     TECHNIQUE:  " Patient was given I-123 orally followed by 24-hour uptake  and scan.     DOSE:  237.8 uCi I-123     FINDINGS:  Thyroid gland  appears relatively normal in size and  homogeneous in uptake. 24-hour uptake was calculated at 46.6% which is  above the normal range.     Normal range is 10-30% 24-hour uptake.                                                                      IMPRESSION:  Elevated thyroid uptake with an anatomically  normal-appearing scan.  All pertinent notes, labs, and images personally reviewed by me.     A/P  Ms.Kari Christian is a 53 year old here for the evaluation of hyperthyroidism.    Hyperthyroidism ( high uptake on scan, neg TSI):    Discussed diagnosis, pathophysiology, management and treatment options of condition with pt.  She is a smoker, no ophthalmopathy.  Discussed medication management for Graves' disease including medication versus radioactive iodine ablation  She would like to start medication.  Start methimazole 20 mg/day  Labs in 4-5 weeks  Follow-up after that  Also discussed beta-blocker but at this time she would like to hold off.  Heart rate in 90s.  Occasional palpitations likely 2/2 to anxiety related.  Of note she has history of Spherocytosis and LFT showed slightly elevated bilirubin.  Will continue to monitor.    Discussed indications, risks and benefits of all medications prescribed, and answered questions to patient's satisfaction.      Discussed possible side effects of methimazole including liver dysfunction and agranulocytosis. Discussed to watch for s/s like jaundice, abdominal pain and skin rash. In case of prolonged unresolving fever, sore throat and infections, advise to seek medical care.    Call if:     Signs or symptoms of infection. These include a fever of 100.5 degrees or higher, chills, severe sore throat, ear or sinus pain, cough, increased sputum or change in color, painful urination, mouth sores.   Severe nausea or vomiting.   Joint pain or swelling.   Not  able to eat.   Unusual bruising or bleeding.   Dark urine or yellow skin or eyes.      Follow-up:  4-6 weeks.    Iraida Gutiérrez MD  Endocrinology  [facility]/[place]  CC: Brenna Morse     More than 50% of face to face time spent with Ms. Christian on counseling / coordinating her care.      All questions were answered.  The patient indicates understanding of the above issues and agrees with the plan set forth.     Addendum to above note and clinic visit:    Labs reviewed.    See result note/telephone encounter.            Again, thank you for allowing me to participate in the care of your patient.        Sincerely,        Iraida Gutiérrez MD

## 2019-08-20 NOTE — NURSING NOTE
ENDOCRINOLOGY INTAKE FORM    Patient Name:  Kari Christian  :  1965    Is patient Diabetic?   No  Does patient have non-diabetic or other endocrine issues?  Yes: hyperthyroidism    Vitals: LMP 2009   BMI= There is no height or weight on file to calculate BMI.    Smoking and Alcohol use:  Social History     Tobacco Use     Smoking status: Current Every Day Smoker     Packs/day: 0.30     Years: 20.00     Pack years: 6.00     Types: Cigarettes     Smokeless tobacco: Never Used   Substance Use Topics     Alcohol use: Yes     Alcohol/week: 1.2 - 1.8 oz     Types: 2 - 3 Standard drinks or equivalent per week     Comment: socially     Drug use: No       Janeth Rai, ALESHIA  [facility]  [place]

## 2019-09-07 ENCOUNTER — TELEPHONE (OUTPATIENT)
Dept: ENDOCRINOLOGY | Facility: CLINIC | Age: 54
End: 2019-09-07

## 2019-09-07 NOTE — TELEPHONE ENCOUNTER
Reason for Call:  Other call back    Detailed comments: Patient is calling because she would like to know if she needs any labs for her follow up on medication. Please follow up with patient.     Phone Number Patient can be reached at: Cell number on file:    Telephone Information:   Mobile [phone]       Best Time: anytime after 4pm on monday    Can we leave a detailed message on this number? YES    Call taken on 9/7/2019 at 11:14 AM by Lina Falk

## 2019-09-09 NOTE — TELEPHONE ENCOUNTER
Labs in 4-5 weeks from 08/20/19.  LM to make lab only appt.    Janeth Rai, ALESHIA  [facility]  [place]

## 2019-09-17 ENCOUNTER — TELEPHONE (OUTPATIENT)
Dept: ENDOCRINOLOGY | Facility: CLINIC | Age: 54
End: 2019-09-17

## 2019-09-17 DIAGNOSIS — E05.90 HYPERTHYROIDISM: ICD-10-CM

## 2019-09-17 NOTE — TELEPHONE ENCOUNTER
Reason for call:  Other   Patient called regarding (reason for call): call back  Additional comments: Call patient back regarding symptoms she is having from the medication Dr. Gutiérrez put her on. She is having tingling and numbness. She doesn't know whether she should continue taking the medication. Please call her back around noon today. It is ok to leave a message.     Phone number to reach patient:  Cell number on file:    Telephone Information:   Mobile [phone]       Best Time:  12 PM TODAY    Can we leave a detailed message on this number?  YES

## 2019-09-17 NOTE — TELEPHONE ENCOUNTER
H/o hyperthyroidism and is on methimazole- started 8/20.  Her s/s are not usual s/e of methimazole.  Recommend labs as first step - may need dose adjustment.  Please ask Kari to get labs done and will adjust dose if needed.  I am in conference so not able to call pt but will follow up on labs.    Let me know if you have any questions.

## 2019-09-18 DIAGNOSIS — E05.90 HYPERTHYROIDISM: ICD-10-CM

## 2019-09-18 LAB
T3FREE SERPL-MCNC: 3.4 PG/ML (ref 2.3–4.2)
WBC # BLD AUTO: 8.1 10E9/L (ref 4–11)

## 2019-09-18 PROCEDURE — 84439 ASSAY OF FREE THYROXINE: CPT | Performed by: INTERNAL MEDICINE

## 2019-09-18 PROCEDURE — 80076 HEPATIC FUNCTION PANEL: CPT | Performed by: INTERNAL MEDICINE

## 2019-09-18 PROCEDURE — 36415 COLL VENOUS BLD VENIPUNCTURE: CPT | Performed by: INTERNAL MEDICINE

## 2019-09-18 PROCEDURE — 84443 ASSAY THYROID STIM HORMONE: CPT | Performed by: INTERNAL MEDICINE

## 2019-09-18 PROCEDURE — 84481 FREE ASSAY (FT-3): CPT | Performed by: INTERNAL MEDICINE

## 2019-09-18 PROCEDURE — 85048 AUTOMATED LEUKOCYTE COUNT: CPT | Performed by: INTERNAL MEDICINE

## 2019-09-19 LAB
ALBUMIN SERPL-MCNC: 3.8 G/DL (ref 3.4–5)
ALP SERPL-CCNC: 89 U/L (ref 40–150)
ALT SERPL W P-5'-P-CCNC: 26 U/L (ref 0–50)
AST SERPL W P-5'-P-CCNC: 19 U/L (ref 0–45)
BILIRUB DIRECT SERPL-MCNC: 0.4 MG/DL (ref 0–0.2)
BILIRUB SERPL-MCNC: 2.5 MG/DL (ref 0.2–1.3)
PROT SERPL-MCNC: 6.5 G/DL (ref 6.8–8.8)
T4 FREE SERPL-MCNC: 1.26 NG/DL (ref 0.76–1.46)
TSH SERPL DL<=0.005 MIU/L-ACNC: <0.01 MU/L (ref 0.4–4)

## 2019-09-19 NOTE — TELEPHONE ENCOUNTER
Labs routed to provider.     WBC-  8.1  T 3- 3.4  T 4- 1.26  TSH- <0.01  Hepatic panel- bili direct 0.4  Bili total - 2.5  Protein- 6.5  Other components of hepatic panel normal

## 2019-09-20 RX ORDER — METHIMAZOLE 10 MG/1
TABLET ORAL
Qty: 60 TABLET | Refills: 3
Start: 2019-09-20 | End: 2019-09-24

## 2019-09-20 NOTE — TELEPHONE ENCOUNTER
ENDO THYROID LABS-[facility] Latest Ref Rng & Units 9/18/2019 7/29/2019   TSH 0.40 - 4.00 mU/L <0.01 (L) <0.01 (L)   T4 FREE 0.76 - 1.46 ng/dL 1.26 2.48 (H)   FREE T3 2.3 - 4.2 pg/mL 3.4 7.5 (H)     Improving labs.  Currently taking methimazole 10mg bid  Based on labs decrease dose to 10 mg AM and 5 mg PM.  If her s/s does not improve after decreasing dose please follow up in clinic.  Labs in 4-5 weeks  Please make a lab appointment for blood work and follow up clinic appointment in 1 week after that to discuss results.

## 2019-09-23 ENCOUNTER — TELEPHONE (OUTPATIENT)
Dept: ENDOCRINOLOGY | Facility: CLINIC | Age: 54
End: 2019-09-23

## 2019-09-23 NOTE — TELEPHONE ENCOUNTER
Please call pt at [phone] today; pt asking about results and questions before Dr Gutiérrez goes on vacation.  Thank you.  lisa

## 2019-09-23 NOTE — TELEPHONE ENCOUNTER
Went to  [facility] in [place], prescribed a hydrocortisone cream.  Stopped methimazole on Thursday.  She had a fever at .  She did say the Saturday prior, she took 2 pills of methimazole by accident.  Please advise.    Janeth Rai, [facility]  [place]

## 2019-09-23 NOTE — TELEPHONE ENCOUNTER
Please call pt at [phone] today; pt asking about results and questions before Dr Gutiérrez goes on vacation.  Thank you.  lisa

## 2019-09-24 NOTE — TELEPHONE ENCOUNTER
Called Kari.    [facility] THYROID LABS-[facility] Latest Ref Rng & Units 9/18/2019 7/29/2019   TSH 0.40 - 4.00 mU/L <0.01 (L) <0.01 (L)   T4 FREE 0.76 - 1.46 ng/dL 1.26 2.48 (H)   FREE T3 2.3 - 4.2 pg/mL 3.4 7.5 (H)   TRIIODOTHYRONINE(T3) 60 - 181 ng/dL     THYROID STIM IMMUNOG <=1.3 TSI index  1.1     History of hypothyroidism and was started on methimazole in aug 2019.  Initially did ok X 3 weeks then took double dose accidentally.  Then started skin rash and hives.  After starting methimazole she developed skin itching after 3 weeks.  Neg TSI and normal uptake.      Based on labs dose was decreased to 10 mg AM and 5 mg PM on 9/19/19.    After that she was in [facility] for fever. Wbc normal.  Was told to stop methimazole by UC provider.  Stopped methimazole:  9/19/19  Off methimazole rash better.  No fever.      I discussed the rash can be a possible side effect of methimazole  Labs are improving and TSH may take longer to normalize  Plan to hold off methimazole and repeat labs in 2 weeks  If rash is not resolving or getting worse off of methimazole then recommend to be seen in clinic with primary care provider.    The patient indicates understanding of these issues and agrees with the plan.  All questions were answered.

## 2019-12-13 NOTE — TELEPHONE ENCOUNTER
Phone contacted the patient and left a voice mail in regard to her change in antibiotics. We will await her call back to inform her of the change. The thyroid labs reveal hyperthyroidism.   I had tried to call patient, but no answer.  I instead sent a [employer] message.    Please call her Monday to make sure she receives the message.  Per notes by recent office visit, she is symptomatic.  Will refer her to endocrinology, Dr. Gutiérrez.    Please see if Dr. Gutiérrez could see her soon given her symptoms.    Will also send to Dr. Gutiérrez to see if I should start patient on medication prior to her seeing the patient.    thanks

## 2020-02-07 ENCOUNTER — OFFICE VISIT (OUTPATIENT)
Dept: PEDIATRICS | Facility: CLINIC | Age: 55
End: 2020-02-07
Payer: COMMERCIAL

## 2020-02-07 ENCOUNTER — ANCILLARY PROCEDURE (OUTPATIENT)
Dept: GENERAL RADIOLOGY | Facility: CLINIC | Age: 55
End: 2020-02-07
Attending: PHYSICIAN ASSISTANT
Payer: COMMERCIAL

## 2020-02-07 VITALS
SYSTOLIC BLOOD PRESSURE: 102 MMHG | WEIGHT: 140.4 LBS | RESPIRATION RATE: 12 BRPM | HEIGHT: 66 IN | TEMPERATURE: 98 F | OXYGEN SATURATION: 97 % | BODY MASS INDEX: 22.56 KG/M2 | HEART RATE: 80 BPM | DIASTOLIC BLOOD PRESSURE: 62 MMHG

## 2020-02-07 DIAGNOSIS — R06.00 DYSPNEA, UNSPECIFIED TYPE: ICD-10-CM

## 2020-02-07 DIAGNOSIS — L03.116 CELLULITIS OF LEG, LEFT: Primary | ICD-10-CM

## 2020-02-07 PROCEDURE — 99214 OFFICE O/P EST MOD 30 MIN: CPT | Performed by: PHYSICIAN ASSISTANT

## 2020-02-07 PROCEDURE — 71046 X-RAY EXAM CHEST 2 VIEWS: CPT

## 2020-02-07 RX ORDER — DOXYCYCLINE HYCLATE 100 MG
100 TABLET ORAL 2 TIMES DAILY
Qty: 20 TABLET | Refills: 0 | Status: SHIPPED | OUTPATIENT
Start: 2020-02-07 | End: 2020-04-09

## 2020-02-07 ASSESSMENT — MIFFLIN-ST. JEOR: SCORE: 1243.6

## 2020-02-07 NOTE — PROGRESS NOTES
"Subjective     Kari Christian is a 54 year old female who presents to clinic today for the following health issues:    HPI   Rash  Onset: x week     Description:   Location: left lower leg   Character: blotchy, raised, painful, burning, red, small bumps   Itching (Pruritis): YES    Progression of Symptoms:  same    Accompanying Signs & Symptoms:  Fever: no   Body aches or joint pain: YES  Sore throat symptoms: no   Recent cold symptoms: no     History:   Previous similar rash: YES    Precipitating factors:   Exposure to similar rash: no   New exposures: None   Recent travel: no     Alleviating factors:  None   Therapies Tried and outcome: antibiotic cream- not effective     Back Pain     Duration: x 1 day         Specific cause: fall   improved with ibuprofen    Description:   Location of pain: upper back center   Character of pain: dull ache  Pain radiation: starts in back and moves around to center of chest   New numbness or weakness in legs, not attributed to pain:  no     Intensity: Currently 4-5/10, moderate    History:   Pain interferes with job: YES  History of back problems: no prior back problems  Any previous MRI or X-rays: None  Sees a specialist for back pain:  No  Therapies tried without relief: none    Alleviating factors:   Improved by: ibu    Precipitating factors:  Worsened by: Sitting and breathing     Review of Systems   ROS COMP: Constitutional, HEENT, cardiovascular, pulmonary, gi and gu systems are negative, except as otherwise noted.      Objective    /62 (BP Location: Right arm, Patient Position: Sitting, Cuff Size: Adult Regular)   Pulse 80   Temp 98  F (36.7  C) (Tympanic)   Resp 12   Ht 1.676 m (5' 6\")   Wt 63.7 kg (140 lb 6.4 oz)   LMP 04/28/2009   SpO2 97%   BMI 22.66 kg/m    Body mass index is 22.66 kg/m .  Physical Exam   GENERAL:alert and no distress  EYES: Eyes grossly normal to inspection, PERRL and conjunctivae and sclerae normal  HENT: ear canals and TM's normal, nose " and mouth without ulcers or lesions  NECK: no adenopathy  RESP: lungs clear to auscultation - no rales, rhonchi or wheezes  CV: regular rate and rhythm, normal S1 S2, no S3 or S4  SKIN: inspection of the left anterior leg reveals diffuse edema and erythema extending from ankle to 3 inches distal to knee. Warm and tender to palpation. Multiple small open wounds present on the distal leg.   Redness outlined.     Diagnostic Test Results:  CXR appears NEGATIVE   No results found for this or any previous visit (from the past 24 hour(s)).        Assessment & Plan   1. Cellulitis of leg, left  Continue to monitor leg. Signs for emergent evaluation discussed with patient. Begin antibiotics as directed.   - doxycycline hyclate (VIBRA-TABS) 100 MG tablet; Take 1 tablet (100 mg) by mouth 2 times daily  Dispense: 20 tablet; Refill: 0    2. Dyspnea, unspecified type  Begin albuterol four times daily PRN. Likely exacerbation of asthma.   - XR Chest 2 Views; Future     Alea Stinson PA-C  [facility]

## 2020-02-07 NOTE — PROGRESS NOTES
"Subjective     Kari Christian is a 54 year old female who presents to clinic today for the following health issues:    HPI   Concern - ***  Onset: ***    Description:   ***    Intensity: {.:020906}    Progression of Symptoms:  {.:806952}    Accompanying Signs & Symptoms:  ***    Previous history of similar problem:   ***    Precipitating factors:   Worsened by: ***    Alleviating factors:  Improved by: ***    Therapies Tried and outcome: ***  {additonal problems for provider to add (Optional):761728}    {HIST REVIEW/ LINKS 2 (Optional):529271}    {Additional problems for the provider to add (optional):039853}  Reviewed and updated as needed this visit by Provider         Review of Systems   {ROS COMP (Optional):432373}      Objective    LMP 04/28/2009   There is no height or weight on file to calculate BMI.  Physical Exam   {Exam List (Optional):473988}    {Diagnostic Test Results (Optional):479452::\"Diagnostic Test Results:\",\"Labs reviewed in Epic\"}        {PROVIDER CHARTING PREFERENCE:684946}      "

## 2020-04-09 ENCOUNTER — TELEPHONE (OUTPATIENT)
Dept: PEDIATRICS | Facility: CLINIC | Age: 55
End: 2020-04-09

## 2020-04-09 DIAGNOSIS — L03.116 CELLULITIS OF LEG, LEFT: ICD-10-CM

## 2020-04-09 RX ORDER — DOXYCYCLINE HYCLATE 100 MG
100 TABLET ORAL 2 TIMES DAILY
Qty: 20 TABLET | Refills: 0 | Status: SHIPPED | OUTPATIENT
Start: 2020-04-09 | End: 2022-05-27

## 2020-04-09 NOTE — TELEPHONE ENCOUNTER
"Pt called stating she believes her leg is infected again. Pt requested the antibiotic Dinah Goberdhan prescribed on 2/7 because it \"seemed to work\". Pt is requesting the Rx to be sent to [facility], [place], on [address].     Please call pt with any questions or to assist. Okayed to leave a detailed message.    Chari Esposito on 4/9/2020 at 12:40 PM          "

## 2020-04-09 NOTE — TELEPHONE ENCOUNTER
Attempted to contact patient  By home # and mobil # left message on both to call back , per Dr. Morse needs a visit today .breast fam Handley LPN

## 2020-04-09 NOTE — TELEPHONE ENCOUNTER
The patient received an antibiotic for redness of anterior aspect of knee.  The area is itchy and has scabs on skin.  She reports that it improved with the antibiotic.   The patient thinks she scratched it and became infected again.       No chest pain or shortness of breath.     She will try to send a picture through [employer].  She also has a rash on her back in addition to her leg.

## 2020-04-10 NOTE — TELEPHONE ENCOUNTER
No mychart photo. Left message for pt to call back and resent photo on [employer].     Junior Castro MA

## 2020-04-13 NOTE — TELEPHONE ENCOUNTER
"Patient states that she has been on antibiotics for a couple of days already and the   \"rash\" or cellulitis is getting better.  She will do a video visit if and when it happens again.  She will continue to monitor and take photos.  LEONA Daniels R.N.    "

## 2020-04-13 NOTE — TELEPHONE ENCOUNTER
See if patient has time this week for a visit via video so we can take a look at her rash  Last week she was unable to   thanks

## 2020-07-28 ENCOUNTER — NURSE TRIAGE (OUTPATIENT)
Dept: INTERNAL MEDICINE | Facility: CLINIC | Age: 55
End: 2020-07-28

## 2020-07-28 NOTE — TELEPHONE ENCOUNTER
Patient has some stomach pain. She has gained weight. She is concerned it may be her thyroid. Patient wants to know if she needs an appointment or lab work done.  Ok to call and lm [phone]

## 2020-07-28 NOTE — TELEPHONE ENCOUNTER
Care advice given per protocol. Recommended patient be seen today per protocol. No appointments in clinic, transferred patient to appointment line to look at other clinics. If patient cannot get an appointment, patient agrees to be seen in urgent care.     Patient will call back with further concerns.    Additional Information    Negative: Passed out (i.e., fainted, collapsed and was not responding)    Negative: Shock suspected (e.g., cold/pale/clammy skin, too weak to stand, low BP, rapid pulse)    Negative: Sounds like a life-threatening emergency to the triager    Negative: Chest pain    Negative: Pain is mainly in upper abdomen (if needed ask: 'is it mainly above the belly button?')    Negative: Abdominal pain and pregnant > 20 weeks    Negative: Abdominal pain and pregnant < 20 weeks    Negative: SEVERE abdominal pain (e.g., excruciating)    Negative: Vomiting red blood or black (coffee ground) material    Negative: Bloody, black, or tarry bowel movements    Negative: Constant abdominal pain lasting > 2 hours    Negative: Vomiting bile (green color)    Negative: Patient sounds very sick or weak to the triager    Negative: Vomiting and abdomen looks much more swollen than usual    Negative: White of the eyes have turned yellow (i.e., jaundice)    Negative: Blood in urine (red, pink, or tea-colored)    Negative: Fever > 103 F (39.4 C)    Negative: Fever > 101 F (38.3 C) and over 60 years of age    Negative: Fever > 100.0 F (37.8 C) and has diabetes mellitus or a weak immune system (e.g., HIV positive, cancer chemotherapy, organ transplant, splenectomy, chronic steroids)    Negative: Fever > 100.0 F (37.8 C) and bedridden (e.g., nursing home patient, stroke, chronic illness, recovering from surgery)    Negative: Pregnant or could be pregnant (i.e., missed last menstrual period)    MODERATE OR MILD pain that comes and goes (cramps) lasts > 24 hours    Answer Assessment - Initial Assessment Questions  1. LOCATION:  "\"Where does it hurt?\"       Generalized abdominal pain   2. RADIATION: \"Does the pain shoot anywhere else?\" (e.g., chest, back)      No  3. ONSET: \"When did the pain begin?\" (e.g., minutes, hours or days ago)       Patient reports pain started about a month ago, but severity has increased   4. SUDDEN: \"Gradual or sudden onset?\"      Gradual   5. PATTERN \"Does the pain come and go, or is it constant?\"     - If constant: \"Is it getting better, staying the same, or worsening?\"       (Note: Constant means the pain never goes away completely; most serious pain is constant and it progresses)      - If intermittent: \"How long does it last?\" \"Do you have pain now?\"      (Note: Intermittent means the pain goes away completely between bouts)      Comes and goes over the last month. Patient reports earlier today she had severe abdominal pain, and at time of the call she has moderate abdominal pain. Pain has been constant since onset this morning.   6. SEVERITY: \"How bad is the pain?\"  (e.g., Scale 1-10; mild, moderate, or severe)    - MILD (1-3): doesn't interfere with normal activities, abdomen soft and not tender to touch     - MODERATE (4-7): interferes with normal activities or awakens from sleep, tender to touch     - SEVERE (8-10): excruciating pain, doubled over, unable to do any normal activities       Moderate at time of the call   7. RECURRENT SYMPTOM: \"Have you ever had this type of abdominal pain before?\" If so, ask: \"When was the last time?\" and \"What happened that time?\"       No  8. CAUSE: \"What do you think is causing the abdominal pain?\"      Patient is unsure, she is concerned for diverticulitis because she has a family history of it. Patient notes she has IBS.  9. RELIEVING/AGGRAVATING FACTORS: \"What makes it better or worse?\" (e.g., movement, antacids, bowel movement)      Patient does not note any of the above   10. OTHER SYMPTOMS: \"Has there been any vomiting, diarrhea, constipation, or urine problems?\"   " "     Patient notes that she feels bloated, and has increased gas and acid reflux   11. PREGNANCY: \"Is there any chance you are pregnant?\" \"When was your last menstrual period?\"        No    Protocols used: ABDOMINAL PAIN - FEMALE-A-OH      "

## 2021-03-19 ENCOUNTER — OFFICE VISIT (OUTPATIENT)
Dept: INTERNAL MEDICINE | Facility: CLINIC | Age: 56
End: 2021-03-19
Payer: COMMERCIAL

## 2021-03-19 ENCOUNTER — ANCILLARY PROCEDURE (OUTPATIENT)
Dept: GENERAL RADIOLOGY | Facility: CLINIC | Age: 56
End: 2021-03-19
Attending: INTERNAL MEDICINE
Payer: COMMERCIAL

## 2021-03-19 VITALS
RESPIRATION RATE: 18 BRPM | HEART RATE: 103 BPM | HEIGHT: 66 IN | TEMPERATURE: 98.3 F | DIASTOLIC BLOOD PRESSURE: 62 MMHG | BODY MASS INDEX: 24.88 KG/M2 | WEIGHT: 154.8 LBS | OXYGEN SATURATION: 98 % | SYSTOLIC BLOOD PRESSURE: 97 MMHG

## 2021-03-19 DIAGNOSIS — F51.01 PRIMARY INSOMNIA: ICD-10-CM

## 2021-03-19 DIAGNOSIS — R06.02 SHORTNESS OF BREATH: ICD-10-CM

## 2021-03-19 DIAGNOSIS — Z12.31 VISIT FOR SCREENING MAMMOGRAM: ICD-10-CM

## 2021-03-19 DIAGNOSIS — Z00.00 ROUTINE GENERAL MEDICAL EXAMINATION AT A HEALTH CARE FACILITY: Primary | ICD-10-CM

## 2021-03-19 DIAGNOSIS — R06.02 SOB (SHORTNESS OF BREATH): ICD-10-CM

## 2021-03-19 DIAGNOSIS — R05.9 COUGH: ICD-10-CM

## 2021-03-19 DIAGNOSIS — F32.0 MAJOR DEPRESSIVE DISORDER, SINGLE EPISODE, MILD (H): ICD-10-CM

## 2021-03-19 DIAGNOSIS — Z82.49 FAMILY HISTORY OF ISCHEMIC HEART DISEASE: ICD-10-CM

## 2021-03-19 LAB
BASOPHILS # BLD AUTO: 0.1 10E9/L (ref 0–0.2)
BASOPHILS NFR BLD AUTO: 1.1 %
DIFFERENTIAL METHOD BLD: ABNORMAL
EOSINOPHIL # BLD AUTO: 0.4 10E9/L (ref 0–0.7)
EOSINOPHIL NFR BLD AUTO: 5.8 %
ERYTHROCYTE [DISTWIDTH] IN BLOOD BY AUTOMATED COUNT: 19.6 % (ref 10–15)
HCT VFR BLD AUTO: 30.6 % (ref 35–47)
HGB BLD-MCNC: 10.3 G/DL (ref 11.7–15.7)
LYMPHOCYTES # BLD AUTO: 1.5 10E9/L (ref 0.8–5.3)
LYMPHOCYTES NFR BLD AUTO: 20.8 %
MCH RBC QN AUTO: 31.2 PG (ref 26.5–33)
MCHC RBC AUTO-ENTMCNC: 33.7 G/DL (ref 31.5–36.5)
MCV RBC AUTO: 93 FL (ref 78–100)
MONOCYTES # BLD AUTO: 0.5 10E9/L (ref 0–1.3)
MONOCYTES NFR BLD AUTO: 7.7 %
NEUTROPHILS # BLD AUTO: 4.5 10E9/L (ref 1.6–8.3)
NEUTROPHILS NFR BLD AUTO: 64.6 %
PLATELET # BLD AUTO: 245 10E9/L (ref 150–450)
RBC # BLD AUTO: 3.3 10E12/L (ref 3.8–5.2)
WBC # BLD AUTO: 7 10E9/L (ref 4–11)

## 2021-03-19 PROCEDURE — 36415 COLL VENOUS BLD VENIPUNCTURE: CPT | Performed by: INTERNAL MEDICINE

## 2021-03-19 PROCEDURE — 99213 OFFICE O/P EST LOW 20 MIN: CPT | Mod: 25 | Performed by: INTERNAL MEDICINE

## 2021-03-19 PROCEDURE — 84439 ASSAY OF FREE THYROXINE: CPT | Performed by: INTERNAL MEDICINE

## 2021-03-19 PROCEDURE — 71046 X-RAY EXAM CHEST 2 VIEWS: CPT | Performed by: RADIOLOGY

## 2021-03-19 PROCEDURE — 82306 VITAMIN D 25 HYDROXY: CPT | Performed by: INTERNAL MEDICINE

## 2021-03-19 PROCEDURE — 80061 LIPID PANEL: CPT | Performed by: INTERNAL MEDICINE

## 2021-03-19 PROCEDURE — 99396 PREV VISIT EST AGE 40-64: CPT | Performed by: INTERNAL MEDICINE

## 2021-03-19 PROCEDURE — 80050 GENERAL HEALTH PANEL: CPT | Performed by: INTERNAL MEDICINE

## 2021-03-19 RX ORDER — BUPROPION HYDROCHLORIDE 150 MG/1
150 TABLET ORAL EVERY MORNING
Qty: 90 TABLET | Refills: 1 | Status: SHIPPED | OUTPATIENT
Start: 2021-03-19 | End: 2022-05-27

## 2021-03-19 RX ORDER — TRAZODONE HYDROCHLORIDE 50 MG/1
50 TABLET, FILM COATED ORAL AT BEDTIME
Qty: 30 TABLET | Refills: 0 | Status: SHIPPED | OUTPATIENT
Start: 2021-03-19 | End: 2021-04-06

## 2021-03-19 RX ORDER — ALBUTEROL SULFATE 90 UG/1
2 AEROSOL, METERED RESPIRATORY (INHALATION) EVERY 6 HOURS PRN
Qty: 18 G | Refills: 3 | Status: SHIPPED | OUTPATIENT
Start: 2021-03-19 | End: 2022-05-27

## 2021-03-19 ASSESSMENT — ANXIETY QUESTIONNAIRES
GAD7 TOTAL SCORE: 12
3. WORRYING TOO MUCH ABOUT DIFFERENT THINGS: MORE THAN HALF THE DAYS
5. BEING SO RESTLESS THAT IT IS HARD TO SIT STILL: SEVERAL DAYS
6. BECOMING EASILY ANNOYED OR IRRITABLE: MORE THAN HALF THE DAYS
1. FEELING NERVOUS, ANXIOUS, OR ON EDGE: MORE THAN HALF THE DAYS
7. FEELING AFRAID AS IF SOMETHING AWFUL MIGHT HAPPEN: SEVERAL DAYS
2. NOT BEING ABLE TO STOP OR CONTROL WORRYING: MORE THAN HALF THE DAYS
IF YOU CHECKED OFF ANY PROBLEMS ON THIS QUESTIONNAIRE, HOW DIFFICULT HAVE THESE PROBLEMS MADE IT FOR YOU TO DO YOUR WORK, TAKE CARE OF THINGS AT HOME, OR GET ALONG WITH OTHER PEOPLE: VERY DIFFICULT

## 2021-03-19 ASSESSMENT — ENCOUNTER SYMPTOMS
JOINT SWELLING: 1
SORE THROAT: 0
DIZZINESS: 1
HEARTBURN: 1
FEVER: 0
DYSURIA: 0
ABDOMINAL PAIN: 0
BREAST MASS: 0
COUGH: 1
DIARRHEA: 1
HEMATURIA: 0
NERVOUS/ANXIOUS: 1
PARESTHESIAS: 1
SHORTNESS OF BREATH: 1
HEMATOCHEZIA: 0
CONSTIPATION: 1
FREQUENCY: 0
CHILLS: 0
WEAKNESS: 1
HEADACHES: 1
PALPITATIONS: 1
MYALGIAS: 1
ARTHRALGIAS: 1
NAUSEA: 0

## 2021-03-19 ASSESSMENT — MIFFLIN-ST. JEOR: SCORE: 1303.92

## 2021-03-19 ASSESSMENT — PATIENT HEALTH QUESTIONNAIRE - PHQ9
5. POOR APPETITE OR OVEREATING: MORE THAN HALF THE DAYS
10. IF YOU CHECKED OFF ANY PROBLEMS, HOW DIFFICULT HAVE THESE PROBLEMS MADE IT FOR YOU TO DO YOUR WORK, TAKE CARE OF THINGS AT HOME, OR GET ALONG WITH OTHER PEOPLE: VERY DIFFICULT
SUM OF ALL RESPONSES TO PHQ QUESTIONS 1-9: 13
SUM OF ALL RESPONSES TO PHQ QUESTIONS 1-9: 13

## 2021-03-19 NOTE — PATIENT INSTRUCTIONS
Mireille, counselor    Trazodone for sleep    Exercise 20 minutes per day (10 minutes in a row)    Cardiology    Pulmonary

## 2021-03-19 NOTE — PROGRESS NOTES
SUBJECTIVE:   CC: Kair Christian is an 55 year old woman who presents for preventive health visit.     Patient has been advised of split billing requirements and indicates understanding: Yes  Healthy Habits:     Getting at least 3 servings of Calcium per day:  NO    Bi-annual eye exam:  NO    Dental care twice a year:  Yes    Sleep apnea or symptoms of sleep apnea:  Daytime drowsiness and Excessive snoring    Diet:  Regular (no restrictions)    Frequency of exercise:  1 day/week    Duration of exercise:  Less than 15 minutes    Taking medications regularly:  Yes    Medication side effects:  Not applicable    PHQ-2 Total Score: 4    Additional concerns today:  Yes        Today's PHQ-2 Score:   PHQ-2 ( 1999 Pfizer) 3/19/2021   Q1: Little interest or pleasure in doing things 2   Q2: Feeling down, depressed or hopeless 2   PHQ-2 Score 4   Q1: Little interest or pleasure in doing things More than half the days   Q2: Feeling down, depressed or hopeless More than half the days   PHQ-2 Score 4       Abuse: Current or Past (Physical, Sexual or Emotional) - No  Do you feel safe in your environment? Yes        Social History     Tobacco Use     Smoking status: Current Every Day Smoker     Packs/day: 0.30     Years: 20.00     Pack years: 6.00     Types: Cigarettes     Smokeless tobacco: Never Used   Substance Use Topics     Alcohol use: Yes     Alcohol/week: 2.0 - 3.0 standard drinks     Types: 2 - 3 Standard drinks or equivalent per week     Comment: socially     She is interested in quitting smoking.  The patient reports that she has had a difficult year with the pandemic.    She would like to try medication.     Alcohol Use 3/19/2021   Prescreen: >3 drinks/day or >7 drinks/week? Not Applicable   Prescreen: >3 drinks/day or >7 drinks/week? -       Any new diagnosis of family breast, ovarian, or bowel cancer?sister with breast cancer    Reviewed orders with patient.  Reviewed health maintenance and updated orders accordingly      Breast CA Risk Screening:  No flowsheet data found.    Mammogram Screening: Recommended mammography every 1-2 years with patient discussion and risk factor consideration  Pertinent mammograms are reviewed under the imaging tab.    History of abnormal Pap smear: NO - age 30- 65 PAP every 3 years recommended     Reviewed and updated as needed this visit by clinical staff  Tobacco  Allergies  Meds   Med Hx  Surg Hx  Fam Hx  Soc Hx        Reviewed and updated as needed this visit by Provider                    Review of Systems   Constitutional: Negative for chills and fever.   HENT: Positive for congestion, ear pain and hearing loss. Negative for sore throat.    Eyes: Positive for visual disturbance.   Respiratory: Positive for cough and shortness of breath.    Cardiovascular: Positive for palpitations. Negative for chest pain and peripheral edema.   Gastrointestinal: Positive for constipation, diarrhea and heartburn. Negative for abdominal pain, hematochezia and nausea.   Breasts:  Positive for tenderness. Negative for breast mass and discharge.   Genitourinary: Positive for urgency. Negative for dysuria, frequency, genital sores, hematuria, pelvic pain, vaginal bleeding and vaginal discharge.   Musculoskeletal: Positive for arthralgias, joint swelling and myalgias.   Skin: Negative for rash.   Neurological: Positive for dizziness, weakness, headaches and paresthesias.   Psychiatric/Behavioral: Positive for mood changes. The patient is nervous/anxious.      She has had problems with sleeping.   Melatonin has not helped.   She would be interested in trazodone.     Shortness of breath.  Worsened over the past year.  Inhaler only helps slightly.  The patient reports that she has a family history of heart disease- father was 59.  She would like to see a cardiology. She denies chest pain with exercise.  She says she has had chest pain and radiates down the left arm intermittently over the past year.     Patient  "plans on seeing ob/gyn for female exam.      OBJECTIVE:   BP 97/62   Pulse 103   Temp 98.3  F (36.8  C) (Oral)   Resp 18   Ht 1.676 m (5' 6\")   Wt 70.2 kg (154 lb 12.8 oz)   LMP 04/28/2009   SpO2 98%   BMI 24.99 kg/m    Physical Exam  GENERAL: healthy, alert and no distress  EYES: Eyes grossly normal to inspection, PERRL and conjunctivae and sclerae normal  HENT: ear canals and TM's normal, nose and mouth without ulcers or lesions  NECK: no adenopathy, no asymmetry, masses, or scars and thyroid normal to palpation  RESP: lungs clear to auscultation - no rales, rhonchi or wheezes  BREAST: plans on seeing gynecology  CV: regular rate and rhythm, normal S1 S2, no S3 or S4, no murmur, click or rub, no peripheral edema and peripheral pulses strong  ABDOMEN: soft, nontender, no hepatosplenomegaly, no masses and bowel sounds normal  MS: no gross musculoskeletal defects noted, no edema  SKIN: no suspicious lesions or rashes  NEURO: Normal strength and tone, mentation intact and speech normal  PSYCH: mentation appears normal, affect normal/bright    Diagnostic Test Results:  Labs reviewed in Epic    ASSESSMENT/PLAN:     (Z00.00) Routine general medical examination at a health care facility  (primary encounter diagnosis)  Comment:   Plan: labs    (F32.0) Major depressive disorder, single episode, mild (H)  Comment: not at goal  Plan: buPROPion (WELLBUTRIN XL) 150 MG 24 hr tablet       -trial of wellbutrin again    (F51.01) Primary insomnia  Comment:   Plan: traZODone (DESYREL) 50 MG tablet           (R06.02) Shortness of breath  Comment: consider asthma versus COPD  Plan: PULMONARY MEDICINE REFERRAL, XR Chest 2 Views            (R05) Cough  Comment:consider asthma versus COPD  Plan: PULMONARY MEDICINE REFERRAL           (Z12.31) Visit for screening mammogram  Comment:   Plan: *MA Screening Digital Bilateral          Patient requests to see cardiology given family history    Patient has been advised of split billing " "requirements and indicates understanding: Yes  COUNSELING:  Reviewed preventive health counseling, as reflected in patient instructions       Regular exercise       Healthy diet/nutrition    Estimated body mass index is 24.99 kg/m  as calculated from the following:    Height as of this encounter: 1.676 m (5' 6\").    Weight as of this encounter: 70.2 kg (154 lb 12.8 oz).        She reports that she has been smoking cigarettes. She has a 6.00 pack-year smoking history. She has never used smokeless tobacco.  Tobacco Cessation Action Plan:   Pharmacotherapies : Zyban/Wellbutrin      Counseling Resources:  ATP IV Guidelines  Pooled Cohorts Equation Calculator  Breast Cancer Risk Calculator  BRCA-Related Cancer Risk Assessment: FHS-7 Tool  FRAX Risk Assessment  ICSI Preventive Guidelines  Dietary Guidelines for Americans, 2010  USDA's MyPlate  ASA Prophylaxis  Lung CA Screening    Brenna Morse MD  [facility]  Answers for HPI/ROS submitted by the patient on 3/19/2021   Annual Exam:  If you checked off any problems, how difficult have these problems made it for you to do your work, take care of things at home, or get along with other people?: Very difficult  PHQ9 TOTAL SCORE: 13    "

## 2021-03-20 LAB
ALBUMIN SERPL-MCNC: 4 G/DL (ref 3.4–5)
ALP SERPL-CCNC: 72 U/L (ref 40–150)
ALT SERPL W P-5'-P-CCNC: 18 U/L (ref 0–50)
ANION GAP SERPL CALCULATED.3IONS-SCNC: 1 MMOL/L (ref 3–14)
AST SERPL W P-5'-P-CCNC: 15 U/L (ref 0–45)
BILIRUB SERPL-MCNC: 2.2 MG/DL (ref 0.2–1.3)
BUN SERPL-MCNC: 18 MG/DL (ref 7–30)
CALCIUM SERPL-MCNC: 8.3 MG/DL (ref 8.5–10.1)
CHLORIDE SERPL-SCNC: 108 MMOL/L (ref 94–109)
CHOLEST SERPL-MCNC: 151 MG/DL
CO2 SERPL-SCNC: 30 MMOL/L (ref 20–32)
CREAT SERPL-MCNC: 0.67 MG/DL (ref 0.52–1.04)
GFR SERPL CREATININE-BSD FRML MDRD: >90 ML/MIN/{1.73_M2}
GLUCOSE SERPL-MCNC: 72 MG/DL (ref 70–99)
HDLC SERPL-MCNC: 58 MG/DL
LDLC SERPL CALC-MCNC: 77 MG/DL
NONHDLC SERPL-MCNC: 93 MG/DL
POTASSIUM SERPL-SCNC: 3.9 MMOL/L (ref 3.4–5.3)
PROT SERPL-MCNC: 7 G/DL (ref 6.8–8.8)
SODIUM SERPL-SCNC: 139 MMOL/L (ref 133–144)
T4 FREE SERPL-MCNC: 0.98 NG/DL (ref 0.76–1.46)
TRIGL SERPL-MCNC: 78 MG/DL
TSH SERPL DL<=0.005 MIU/L-ACNC: 0.31 MU/L (ref 0.4–4)

## 2021-03-20 ASSESSMENT — ASTHMA QUESTIONNAIRES: ACT_TOTALSCORE: 18

## 2021-03-20 ASSESSMENT — ANXIETY QUESTIONNAIRES: GAD7 TOTAL SCORE: 12

## 2021-03-21 LAB — DEPRECATED CALCIDIOL+CALCIFEROL SERPL-MC: 28 UG/L (ref 20–75)

## 2021-03-27 ENCOUNTER — TELEPHONE (OUTPATIENT)
Dept: INTERNAL MEDICINE | Facility: CLINIC | Age: 56
End: 2021-03-27

## 2021-03-27 DIAGNOSIS — D64.9 ANEMIA, UNSPECIFIED TYPE: Primary | ICD-10-CM

## 2021-04-05 ENCOUNTER — IMMUNIZATION (OUTPATIENT)
Dept: NURSING | Facility: CLINIC | Age: 56
End: 2021-04-05
Payer: COMMERCIAL

## 2021-04-05 DIAGNOSIS — F51.01 PRIMARY INSOMNIA: ICD-10-CM

## 2021-04-05 PROCEDURE — 91300 PR COVID VAC PFIZER DIL RECON 30 MCG/0.3 ML IM: CPT

## 2021-04-05 PROCEDURE — 0001A PR COVID VAC PFIZER DIL RECON 30 MCG/0.3 ML IM: CPT

## 2021-04-06 RX ORDER — TRAZODONE HYDROCHLORIDE 50 MG/1
50 TABLET, FILM COATED ORAL AT BEDTIME
Qty: 30 TABLET | Refills: 10 | Status: SHIPPED | OUTPATIENT
Start: 2021-04-06 | End: 2021-04-08

## 2021-04-07 DIAGNOSIS — F51.01 PRIMARY INSOMNIA: ICD-10-CM

## 2021-04-07 NOTE — TELEPHONE ENCOUNTER
"PHARMACY IS REQUESTING 90 DAY REFILLS    Trazodone  Last Written Prescription Date:  04/06/21  Last Fill Quantity: 30,  # refills: 10   Last office visit: 3/19/2021 with prescribing provider:  03/19/21   Future Office Visit:              Requested Prescriptions   Pending Prescriptions Disp Refills     traZODone (DESYREL) 50 MG tablet 30 tablet 10     Sig: Take 1 tablet (50 mg) by mouth At Bedtime       Serotonin Modulators Passed - 4/7/2021 12:50 PM        Passed - Recent (12 mo) or future (30 days) visit within the authorizing provider's specialty     Patient has had an office visit with the authorizing provider or a provider within the authorizing providers department within the previous 12 mos or has a future within next 30 days. See \"Patient Info\" tab in inbasket, or \"Choose Columns\" in Meds & Orders section of the refill encounter.              Passed - Medication is active on med list        Passed - Patient is age 18 or older        Passed - No active pregnancy on record        Passed - No positive pregnancy test in past 12 months             "

## 2021-04-08 RX ORDER — TRAZODONE HYDROCHLORIDE 50 MG/1
50 TABLET, FILM COATED ORAL AT BEDTIME
Qty: 90 TABLET | Refills: 3 | Status: SHIPPED | OUTPATIENT
Start: 2021-04-08 | End: 2022-05-27

## 2021-04-08 NOTE — TELEPHONE ENCOUNTER
Pending Prescriptions:                       Disp   Refills    traZODone (DESYREL) 50 MG tablet          90 tab*3            Sig: Take 1 tablet (50 mg) by mouth At Bedtime    Prescription approved per [facility] Refill Protocol.

## 2021-04-26 ENCOUNTER — HOSPITAL ENCOUNTER (OUTPATIENT)
Dept: MAMMOGRAPHY | Facility: CLINIC | Age: 56
Discharge: HOME | End: 2021-04-26
Attending: INTERNAL MEDICINE | Admitting: INTERNAL MEDICINE
Payer: COMMERCIAL

## 2021-04-26 ENCOUNTER — IMMUNIZATION (OUTPATIENT)
Dept: NURSING | Facility: CLINIC | Age: 56
End: 2021-04-26
Attending: INTERNAL MEDICINE
Payer: COMMERCIAL

## 2021-04-26 DIAGNOSIS — Z12.31 VISIT FOR SCREENING MAMMOGRAM: ICD-10-CM

## 2021-04-26 PROCEDURE — 91300 PR COVID VAC PFIZER DIL RECON 30 MCG/0.3 ML IM: CPT

## 2021-04-26 PROCEDURE — 0002A PR COVID VAC PFIZER DIL RECON 30 MCG/0.3 ML IM: CPT

## 2021-04-26 PROCEDURE — 77063 BREAST TOMOSYNTHESIS BI: CPT

## 2021-08-05 ENCOUNTER — NURSE TRIAGE (OUTPATIENT)
Dept: NURSING | Facility: CLINIC | Age: 56
End: 2021-08-05

## 2021-08-05 NOTE — TELEPHONE ENCOUNTER
Patient has had an exposure to a person positive for Covid.  Patient was exposed the last two days. Patient has symptoms cough, nasal congestion, run down, weird taste in your mouth.   Headache 2 days ago. Patient is fully vaccinated.   Patient is looking to get herself, son, and grandchildren scheduled for testing at the same time today.   Informed that the [facility] has a listing of all testing sites. Offered to schedule visit with provider but patient declined.   Care advice given and patient will call back with any worsening symptoms.   Daksha Hernandez RN   08/05/21 1:45 PM  [facility] Nurse Advisor  COVID 19 Nurse Triage Plan/Patient Instructions    Please be aware that novel coronavirus (COVID-19) may be circulating in the community. If you develop symptoms such as fever, cough, or SOB or if you have concerns about the presence of another infection including coronavirus (COVID-19), please contact your health care provider or visit [email or web address].     Disposition/Instructions    Virtual Visit with provider recommended. Reference Visit Selection Guide.    Thank you for taking steps to prevent the spread of this virus.  o Limit your contact with others.  o Wear a simple mask to cover your cough.  o Wash your hands well and often.    Resources    [facility]: About COVID-19: [email or web address]    CDC: What to Do If You're Sick: www.cdc.gov/coronavirus/2019-ncov/about/steps-when-sick.html    CDC: Ending Home Isolation: www.cdc.gov/coronavirus/2019-ncov/hcp/disposition-in-home-patients.html     CDC: Caring for Someone: www.cdc.gov/coronavirus/2019-ncov/if-you-are-sick/care-for-someone.html     [facility]: Interim Guidance for Hospital Discharge to Home: www.health.[facility].[place]/diseases/coronavirus/hcp/hospdischarge.pdf    [facility] clinical trials (COVID-19 research studies): [email or web address]     Below are the COVID-19  hotlines at the [place] Department of Health ([facility]). Interpreters are available.   o For health questions: Call 616-677-3442 or 1-853.747.6861 (7 a.m. to 7 p.m.)  o For questions about schools and childcare: Call [phone] or 1-179.677.3415 (7 a.m. to 7 p.m.)                     Reason for Disposition    [1] COVID-19 infection suspected by caller or triager AND [2] mild symptoms (cough, fever, or others) AND [3] no complications or SOB    Additional Information    Negative: SEVERE difficulty breathing (e.g., struggling for each breath, speaks in single words)    Negative: Difficult to awaken or acting confused (e.g., disoriented, slurred speech)    Negative: Bluish (or gray) lips or face now    Negative: Shock suspected (e.g., cold/pale/clammy skin, too weak to stand, low BP, rapid pulse)    Negative: Sounds like a life-threatening emergency to the triager    Negative: [1] COVID-19 exposure AND [2] has not completed COVID-19 vaccine series AND [3] no symptoms    Negative: [1] COVID-19 exposure AND [2] completed COVID-19 vaccine series (fully vaccinated) AND [3] no symptoms    Negative: COVID-19 vaccine reaction suspected (e.g., fever, headache, muscle aches) occurring during days 1-3 after getting vaccine    Negative: COVID-19 vaccine, questions about    Negative: [1] COVID-19 vaccine series completed (fully vaccinated) in past 3 months AND [2] new-onset of COVID-19 symptoms BUT [3] no known exposure    Negative: [1] Had lab test confirmed COVID-19 infection within last 3 monthsAND [2] new-onset of COVID-19 symptoms BUT [3] no known exposure    Negative: [1] Lives with someone known to have influenza (flu test positive) AND [2] flu-like symptoms (e.g., cough, runny nose, sore throat, SOB; with or without fever)    Negative: [1] Adult with possible COVID-19 symptoms AND [2] triager concerned about severity of symptoms or other causes    Negative: COVID-19 and breastfeeding, questions about    Negative: SEVERE or  constant chest pain or pressure (Exception: mild central chest pain, present only when coughing)    Negative: MODERATE difficulty breathing (e.g., speaks in phrases, SOB even at rest, pulse 100-120)    Negative: [1] Headache AND [2] stiff neck (can't touch chin to chest)    Negative: MILD difficulty breathing (e.g., minimal/no SOB at rest, SOB with walking, pulse <100)    Negative: Chest pain or pressure    Negative: Patient sounds very sick or weak to the triager    Negative: Fever > 103 F (39.4 C)    Negative: [1] Fever > 101 F (38.3 C) AND [2] age > 60 years    Negative: [1] Fever > 100.0 F (37.8 C) AND [2] bedridden (e.g., nursing home patient, CVA, chronic illness, recovering from surgery)    Negative: [1] HIGH RISK patient (e.g., age > 64 years, diabetes, heart or lung disease, weak immune system) AND [2] new or worsening symptoms    Negative: [1] HIGH RISK patient AND [2] influenza is widespread in the community AND [3] ONE OR MORE respiratory symptoms: cough, sore throat, runny or stuffy nose    Negative: [1] HIGH RISK patient AND [2] influenza exposure within the last 7 days AND [3] ONE OR MORE respiratory symptoms: cough, sore throat, runny or stuffy nose    Negative: Fever present > 3 days (72 hours)    Negative: [1] Fever returns after gone for over 24 hours AND [2] symptoms worse or not improved    Negative: [1] Continuous (nonstop) coughing interferes with work or school AND [2] no improvement using cough treatment per protocol    Protocols used: CORONAVIRUS (COVID-19) DIAGNOSED OR ZPRFBRFFV-T-SP 3.25

## 2021-12-03 ENCOUNTER — OFFICE VISIT (OUTPATIENT)
Dept: CARDIOLOGY | Facility: CLINIC | Age: 56
End: 2021-12-03
Attending: INTERNAL MEDICINE
Payer: COMMERCIAL

## 2021-12-03 VITALS
WEIGHT: 156.5 LBS | OXYGEN SATURATION: 99 % | HEART RATE: 96 BPM | HEIGHT: 66 IN | BODY MASS INDEX: 25.15 KG/M2 | DIASTOLIC BLOOD PRESSURE: 70 MMHG | SYSTOLIC BLOOD PRESSURE: 100 MMHG

## 2021-12-03 DIAGNOSIS — Z82.49 FAMILY HISTORY OF ISCHEMIC HEART DISEASE: ICD-10-CM

## 2021-12-03 DIAGNOSIS — R06.02 SOB (SHORTNESS OF BREATH): Primary | ICD-10-CM

## 2021-12-03 DIAGNOSIS — R07.2 PRECORDIAL PAIN: ICD-10-CM

## 2021-12-03 PROCEDURE — 93000 ELECTROCARDIOGRAM COMPLETE: CPT | Performed by: INTERNAL MEDICINE

## 2021-12-03 PROCEDURE — 99204 OFFICE O/P NEW MOD 45 MIN: CPT | Performed by: INTERNAL MEDICINE

## 2021-12-03 ASSESSMENT — MIFFLIN-ST. JEOR: SCORE: 1311.63

## 2021-12-03 NOTE — LETTER
"12/3/2021    Brenna Morse MD  [address] [facility] [address]    RE: Kari Christian       Dear Colleague,    I had the pleasure of seeing Kari Christian in the [facility].  HISTORY OF PRESENT ILLNESS:  Primary care MD saw in March 2021 tingling swelling in legs.  Smoke 10 cigarettes whole adult life no heart attack   Has been told she has \" anxity\" tingles and has shortness of breath and left arm pain   Exhausts   inhaler  No diabetes  Cholesterol ok   Wt has gone up. Before was 140.   BP always lower than normal    Dad had CABG 59 to 60   MOM with hypertension  HS   GI doctor ? 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    There are no discontinued medications.    Encounter Diagnosis   Name Primary?     Family history of ischemic heart disease        CURRENT MEDICATIONS:  Current Outpatient Medications   Medication Sig Dispense Refill     albuterol (PROAIR HFA/PROVENTIL HFA/VENTOLIN HFA) 108 (90 Base) MCG/ACT inhaler Inhale 2 puffs into the lungs every 6 hours as needed for shortness of breath / dyspnea 18 g 3     FOLIC ACID PO Take 1 mg by mouth as needed        buPROPion (WELLBUTRIN XL) 150 MG 24 hr tablet Take 1 tablet (150 mg) by mouth every morning (Patient not taking: Reported on 12/3/2021) 90 tablet 1     doxycycline hyclate (VIBRA-TABS) 100 MG tablet Take 1 tablet (100 mg) by mouth 2 times daily (Patient not taking: Reported on 12/3/2021) 20 tablet 0     OMEPRAZOLE PO Take by mouth as needed (Patient not taking: Reported on 12/3/2021)       traZODone (DESYREL) 50 MG tablet Take 1 tablet (50 mg) by mouth At Bedtime (Patient not taking: Reported on 12/3/2021) 90 tablet 3       ALLERGIES     Allergies " "  Allergen Reactions     Cephalexin Itching     Sulfa Drugs        PAST MEDICAL, SURGICAL, FAMILY, SOCIAL HISTORY:  History was reviewed and updated as needed, see medical record.    Review of Systems:  A 12-point review of systems was completed, see medical record for detailed review of systems information.    Physical Exam:  Vitals: /70 (BP Location: Right arm, Patient Position: Sitting, Cuff Size: Adult Regular)   Pulse 96   Ht 1.676 m (5' 6\")   Wt 71 kg (156 lb 8 oz)   LMP 04/28/2009   SpO2 99%   BMI 25.26 kg/m      Constitutional:           Skin:           Head:           Eyes:           ENT:           Neck:           Chest:           Cardiac:                    Abdomen:           Vascular:                                        Extremities and Back:           Neurological:           ASSESSMENT: Most likely deconditioning smoking and mild anemia       RECOMMENDATIONS:   Stress echocardiolgram  Vascular clinic  Stop smoking      Recent Lab Results:  LIPID RESULTS:  Lab Results   Component Value Date    CHOL 151 03/19/2021    HDL 58 03/19/2021    LDL 77 03/19/2021    TRIG 78 03/19/2021    CHOLHDLRATIO 2.0 01/19/2015       LIVER ENZYME RESULTS:  Lab Results   Component Value Date    AST 15 03/19/2021    ALT 18 03/19/2021       CBC RESULTS:  Lab Results   Component Value Date    WBC 7.0 03/19/2021    RBC 3.30 (L) 03/19/2021    HGB 10.3 (L) 03/19/2021    HCT 30.6 (L) 03/19/2021    MCV 93 03/19/2021    MCH 31.2 03/19/2021    MCHC 33.7 03/19/2021    RDW 19.6 (H) 03/19/2021     03/19/2021       BMP RESULTS:  Lab Results   Component Value Date     03/19/2021    POTASSIUM 3.9 03/19/2021    CHLORIDE 108 03/19/2021    CO2 30 03/19/2021    ANIONGAP 1 (L) 03/19/2021    GLC 72 03/19/2021    BUN 18 03/19/2021    CR 0.67 03/19/2021    GFRESTIMATED >90 03/19/2021    GFRESTBLACK >90 03/19/2021    ELLIE 8.3 (L) 03/19/2021        A1C RESULTS:  No results found for: A1C    INR RESULTS:  No results found for: " INR    We greatly appreciate the opportunity to be involved in the care of your patient, Kari Christian.    Sincerely,  David Triplett MD      CC  Brenna Morse MD  [address] [place] [address]

## 2021-12-03 NOTE — PROGRESS NOTES
"HISTORY OF PRESENT ILLNESS:  Primary care MD saw in March 2021 tingling swelling in legs.  Smoke 10 cigarettes whole adult life no heart attack   Has been told she has \" anxity\" tingles and has shortness of breath and left arm pain   Exhausts   inhaler  No diabetes  Cholesterol ok   Wt has gone up. Before was 140.   BP always lower than normal    Dad had CABG 59 to 60   MOM with hypertension  HS   GI doctor ? 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    There are no discontinued medications.    Encounter Diagnosis   Name Primary?     Family history of ischemic heart disease        CURRENT MEDICATIONS:  Current Outpatient Medications   Medication Sig Dispense Refill     albuterol (PROAIR HFA/PROVENTIL HFA/VENTOLIN HFA) 108 (90 Base) MCG/ACT inhaler Inhale 2 puffs into the lungs every 6 hours as needed for shortness of breath / dyspnea 18 g 3     FOLIC ACID PO Take 1 mg by mouth as needed        buPROPion (WELLBUTRIN XL) 150 MG 24 hr tablet Take 1 tablet (150 mg) by mouth every morning (Patient not taking: Reported on 12/3/2021) 90 tablet 1     doxycycline hyclate (VIBRA-TABS) 100 MG tablet Take 1 tablet (100 mg) by mouth 2 times daily (Patient not taking: Reported on 12/3/2021) 20 tablet 0     OMEPRAZOLE PO Take by mouth as needed (Patient not taking: Reported on 12/3/2021)       traZODone (DESYREL) 50 MG tablet Take 1 tablet (50 mg) by mouth At Bedtime (Patient not taking: Reported on 12/3/2021) 90 tablet 3       ALLERGIES     Allergies   Allergen Reactions     Cephalexin Itching     Sulfa Drugs        PAST MEDICAL, SURGICAL, FAMILY, SOCIAL HISTORY:  History was reviewed and updated as needed, see medical record.    Review of Systems:  A 12-point review of systems was " "completed, see medical record for detailed review of systems information.    Physical Exam:  Vitals: /70 (BP Location: Right arm, Patient Position: Sitting, Cuff Size: Adult Regular)   Pulse 96   Ht 1.676 m (5' 6\")   Wt 71 kg (156 lb 8 oz)   LMP 04/28/2009   SpO2 99%   BMI 25.26 kg/m      Constitutional:           Skin:           Head:           Eyes:           ENT:           Neck:           Chest:           Cardiac:                    Abdomen:           Vascular:                                        Extremities and Back:           Neurological:           ASSESSMENT: Most likely deconditioning smoking and mild anemia       RECOMMENDATIONS:   Stress echocardiolgram  Vascular clinic  Stop smoking      Recent Lab Results:  LIPID RESULTS:  Lab Results   Component Value Date    CHOL 151 03/19/2021    HDL 58 03/19/2021    LDL 77 03/19/2021    TRIG 78 03/19/2021    CHOLHDLRATIO 2.0 01/19/2015       LIVER ENZYME RESULTS:  Lab Results   Component Value Date    AST 15 03/19/2021    ALT 18 03/19/2021       CBC RESULTS:  Lab Results   Component Value Date    WBC 7.0 03/19/2021    RBC 3.30 (L) 03/19/2021    HGB 10.3 (L) 03/19/2021    HCT 30.6 (L) 03/19/2021    MCV 93 03/19/2021    MCH 31.2 03/19/2021    MCHC 33.7 03/19/2021    RDW 19.6 (H) 03/19/2021     03/19/2021       BMP RESULTS:  Lab Results   Component Value Date     03/19/2021    POTASSIUM 3.9 03/19/2021    CHLORIDE 108 03/19/2021    CO2 30 03/19/2021    ANIONGAP 1 (L) 03/19/2021    GLC 72 03/19/2021    BUN 18 03/19/2021    CR 0.67 03/19/2021    GFRESTIMATED >90 03/19/2021    GFRESTBLACK >90 03/19/2021    ELLIE 8.3 (L) 03/19/2021        A1C RESULTS:  No results found for: A1C    INR RESULTS:  No results found for: INR    We greatly appreciate the opportunity to be involved in the care of your patient, Kari Christian.    Sincerely,  David Triplett MD      CC  Brenna Morse MD  [facility] [place]  [place] " [address]

## 2022-02-03 ENCOUNTER — TELEPHONE (OUTPATIENT)
Dept: CARDIOLOGY | Facility: CLINIC | Age: 57
End: 2022-02-03
Payer: COMMERCIAL

## 2022-02-03 DIAGNOSIS — R60.9 EDEMA, UNSPECIFIED TYPE: Primary | ICD-10-CM

## 2022-02-03 NOTE — TELEPHONE ENCOUNTER
Contacted patient to review further. Patient states that she has had issues with her left leg for over 10 years. Patient states that she had an injury to it about 10 years ago, and has had issues of swelling, itchiness, and scabbing intermittently ever since then. Patient was referred to Dr. Reeves by Dr. Triplett for venous insufficiency evaluation. Patient is wondering about getting in sooner, as she thinks she has an infection in her leg. Advised patient that if she thinks she has an infection, she should be seen in her PCP clinic. Patient verbalized understanding and agreed with plan of care. OV made with vein clinic [facility] for 2/15, will route to Dr. Reeves to inquire if any testing could be ordered prior to appointment.     Last OV 12/3/21 with Dr. Triplett:    ASSESSMENT:  Ms. Christian has a large number of difficult to evaluate complaints in the setting of depression/anxiety.  Because of the patient's smoking history and family history, I believe formal evaluation with a stress echocardiogram would be  reasonable.  If her stress test is unremarkable, I would not advise any further cardiac testing.  The patient should stop smoking cigarettes. Upon her request we will refer her for a vein clinic evaluation.      RECOMMENDATIONS:    1.  Immediate cessation of tobacco use.  2.  Stress echocardiogram. Unless significant ischemia or LV dysfunction seen, I would not advise extensive cardiac testing.  3.  If the patient's stress echo is negative, I would advise a regular exercise program and Mediterranean-style diet along with tobacco cessation.  4. Vascular clinic evaluation for concerns regarding venous insufficiency.     We greatly appreciate the opportunity to care for your patient, Kari Christian.     David Triplett MD

## 2022-02-03 NOTE — TELEPHONE ENCOUNTER
[facility] Call Center    Phone Message    May a detailed message be left on voicemail: yes     Reason for Call: Other: Kari called n regards to her leg and a flare up and possible infection. She is asking to be seen sooner than her 3-4-2022 visit. My schedule was booked out. Please call to discuss her options. Thank-you     Action Taken: Message routed to:  [facility]    Travel Screening: Not Applicable

## 2022-02-04 NOTE — TELEPHONE ENCOUNTER
Ralph Reeves MD  You Just now (10:47 AM)     CK    Yes definitely vein comp if we can beforehand. As just kind of a standing philosophical thing, I'm delighted to have any vein patient get a comp before the clinic visit it helps for ordering and documentation of what we're planning on doing.     Thanks!    Message text      Order placed for bilateral venous competency study. Message sent to scheduling to get patient scheduled.

## 2022-02-08 ENCOUNTER — ANCILLARY PROCEDURE (OUTPATIENT)
Dept: VASCULAR ULTRASOUND | Facility: CLINIC | Age: 57
End: 2022-02-08
Attending: INTERNAL MEDICINE
Payer: COMMERCIAL

## 2022-02-08 DIAGNOSIS — R60.9 EDEMA, UNSPECIFIED TYPE: ICD-10-CM

## 2022-02-08 PROCEDURE — 93970 EXTREMITY STUDY: CPT | Performed by: INTERNAL MEDICINE

## 2022-03-04 ENCOUNTER — OFFICE VISIT (OUTPATIENT)
Dept: CARDIOLOGY | Facility: CLINIC | Age: 57
End: 2022-03-04
Attending: INTERNAL MEDICINE
Payer: COMMERCIAL

## 2022-03-04 VITALS
OXYGEN SATURATION: 98 % | BODY MASS INDEX: 25.39 KG/M2 | WEIGHT: 158 LBS | HEART RATE: 85 BPM | SYSTOLIC BLOOD PRESSURE: 98 MMHG | DIASTOLIC BLOOD PRESSURE: 60 MMHG | HEIGHT: 66 IN

## 2022-03-04 DIAGNOSIS — M79.662 PAIN OF LEFT LOWER LEG: Primary | ICD-10-CM

## 2022-03-04 DIAGNOSIS — Z82.49 FAMILY HISTORY OF ISCHEMIC HEART DISEASE: ICD-10-CM

## 2022-03-04 DIAGNOSIS — R06.02 SOB (SHORTNESS OF BREATH): ICD-10-CM

## 2022-03-04 DIAGNOSIS — I87.2 VENOUS INCOMPETENCE: ICD-10-CM

## 2022-03-04 DIAGNOSIS — R07.2 PRECORDIAL PAIN: ICD-10-CM

## 2022-03-04 DIAGNOSIS — M79.3 LIPODERMATOSCLEROSIS OF LEFT LOWER EXTREMITY: ICD-10-CM

## 2022-03-04 PROCEDURE — 99215 OFFICE O/P EST HI 40 MIN: CPT | Performed by: INTERNAL MEDICINE

## 2022-03-04 NOTE — LETTER
3/4/2022    Brenna Morse MD  [address] [facility] [address]    RE: Kari Christian       Dear Colleague,     I had the pleasure of seeing Kari Christian in the [facility].  Vascular Cardiology Consultation      Kari Christian MRN# [id number]   YOB: 1965 Age: 56 year old   Date of Visit 03/04/2022     Reason for consult:  Venous incompetence           Assessment and Plan:     1. Severe left greater saphenous vein incompetence of approximately 3 seconds left lower extremity that corresponds to an area of erythema and congestion in the left lower extremity, refractory to conservative treatment greater than 6 weeks including attempts at compression and elevation    Plan left proximal greater saphenous vein radiofrequency ablation and sclerotherapy of the distal saphenous vein      2. Dyspnea on exertion and chest discomfort    Plan stress testing    40 minutes spent personally today reviewing EMR, decision making, discussing with patient and communication with staff as well as charting.    This note was transcribed using electronic voice recognition software, typographical errors may be present.                Chief Complaint:   Vascular Disease ( Vascular consult )           History of Present Illness:   This patient is a very pleasant 56 year old female that I am meeting for the first time.  She has left greater saphenous vein reflux approximately 3 seconds and an area of induration and erythema on the left lower extremity that correspond to this area.  It is very painful and tender.  It is not responsive to conservative therapy.  Compression stockings induce pain and she cannot tolerate it.  The discomfort is significant and affects her quality of life.  She is concerned about future damage to the tissue.    She also has dyspnea on exertion and atypical chest discomfort.  She has hypotension that precludes diuretics and additional pharmacotherapy.         Physical Exam:  "    Vitals: BP 98/60 (BP Location: Right arm, Patient Position: Sitting, Cuff Size: Adult Regular)   Pulse 85   Ht 1.676 m (5' 6\")   Wt 71.7 kg (158 lb)   LMP 04/28/2009   SpO2 98%   Breastfeeding No   BMI 25.50 kg/m    Constitutional:  cooperative, alert and oriented, well developed, well nourished, in no acute distress        Skin:  warm and dry to the touch        Head:  normocephalic, no masses or lesions        Eyes:  pupils equal and round, conjunctivae and lids unremarkable, sclera white, no xanthalasma, EOMS intact, no nystagmus        ENT:       Equal and reactive    Neck:  JVP normal        Chest:  normal symmetry        Cardiac: regular rhythm                  Abdomen:  BS normoactive        Vascular:                                        Extremities and Back:  no deformities, clubbing, cyanosis, erythema observed        Neurological:  no gross motor deficits;affect appropriate                      Past Medical History:   I have reviewed this patient's past medical history  Past Medical History:   Diagnosis Date     Anemia, unspecified     normal is 7-10     Hereditary spherocytosis (H)     no past transfusion;      Major depressive disorder, single episode, mild (H)      Thyroid disease              Past Surgical History:   I have reviewed this patient's past surgical history  Past Surgical History:   Procedure Laterality Date     CHOLECYSTECTOMY  2007     COLONOSCOPY  09/2014     COLONOSCOPY  01/15/2018    Dr. Wade [facility]     ESOPHAGOSCOPY, GASTROSCOPY, DUODENOSCOPY (EGD), COMBINED N/A 1/15/2018    Procedure: COMBINED ESOPHAGOSCOPY, GASTROSCOPY, DUODENOSCOPY (EGD), BIOPSY SINGLE OR MULTIPLE;  ESOPHAGOSCOPY, GASTROSCOPY, DUODENOSCOPY (EGD) with cold biopsies of duodenum and  COLONOSCOPY with polypectomy;  Surgeon: Chad Wade MD;  Location:  GI     HEAD & NECK SURGERY      wisdom teeth removed               Social History:   I have reviewed this patient's social history  Social History "     Tobacco Use     Smoking status: Current Every Day Smoker     Packs/day: 0.30     Years: 20.00     Pack years: 6.00     Types: Cigarettes     Smokeless tobacco: Never Used     Tobacco comment: current smoker    Substance Use Topics     Alcohol use: Not Currently     Alcohol/week: 2.0 - 3.0 standard drinks     Types: 2 - 3 Standard drinks or equivalent per week     Comment: socially             Family History:   I have reviewed this patient's family history  Family History   Problem Relation Age of Onset     Hypertension Mother      Arthritis Mother      Respiratory Mother         Emphysema     Gastrointestinal Disease Mother      Circulatory Mother      C.A.D. Father 59     Hypertension Father      Cancer Father         Esophageal     Gastrointestinal Disease Father         Gallstones, Gallbladder removal, Colostomy, diverticulitis     Cancer Maternal Grandfather         Kidney     Cancer Paternal Grandfather         Lung, smoker     Breast Cancer Sister      Thyroid Disease Sister      Cancer - colorectal No family hx of      Colon Cancer No family hx of              Allergies:     Allergies   Allergen Reactions     Cephalexin Itching     Sulfa Drugs              Medications:   I have reviewed this patient's current medications  Current Outpatient Medications   Medication Sig Dispense Refill     albuterol (PROAIR HFA/PROVENTIL HFA/VENTOLIN HFA) 108 (90 Base) MCG/ACT inhaler Inhale 2 puffs into the lungs every 6 hours as needed for shortness of breath / dyspnea 18 g 3     FOLIC ACID PO Take 1 mg by mouth as needed        buPROPion (WELLBUTRIN XL) 150 MG 24 hr tablet Take 1 tablet (150 mg) by mouth every morning (Patient not taking: Reported on 12/3/2021) 90 tablet 1     doxycycline hyclate (VIBRA-TABS) 100 MG tablet Take 1 tablet (100 mg) by mouth 2 times daily (Patient not taking: Reported on 12/3/2021) 20 tablet 0     OMEPRAZOLE PO Take by mouth as needed (Patient not taking: Reported on 12/3/2021)        traZODone (DESYREL) 50 MG tablet Take 1 tablet (50 mg) by mouth At Bedtime (Patient not taking: Reported on 12/3/2021) 90 tablet 3               Review of Systems:       Review of Systems:  Skin:  Negative     Eyes:  Negative    ENT:  Negative    Respiratory:  Positive for dyspnea on exertion;shortness of breath  Cardiovascular:    palpitations;Positive for  Gastroenterology: Negative    Genitourinary:  Negative    Musculoskeletal:  Negative    Neurologic:  Negative    Psychiatric:  Negative    Heme/Lymph/Imm:  Negative    Endocrine:  Negative                       Data:   All laboratory data reviewed  Lab Results   Component Value Date    CHOL 151 03/19/2021     Lab Results   Component Value Date    HDL 58 03/19/2021     Lab Results   Component Value Date    LDL 77 03/19/2021     Lab Results   Component Value Date    TRIG 78 03/19/2021     Lab Results   Component Value Date    CHOLHDLRATIO 2.0 01/19/2015     TSH   Date Value Ref Range Status   03/19/2021 0.31 (L) 0.40 - 4.00 mU/L Final     Last Basic Metabolic Panel:  Lab Results   Component Value Date     03/19/2021      Lab Results   Component Value Date    POTASSIUM 3.9 03/19/2021     Lab Results   Component Value Date    CHLORIDE 108 03/19/2021     Lab Results   Component Value Date    ELLIE 8.3 03/19/2021     Lab Results   Component Value Date    CO2 30 03/19/2021     Lab Results   Component Value Date    BUN 18 03/19/2021     Lab Results   Component Value Date    CR 0.67 03/19/2021     Lab Results   Component Value Date    GLC 72 03/19/2021     Lab Results   Component Value Date    WBC 7.0 03/19/2021     Lab Results   Component Value Date    RBC 3.30 03/19/2021     Lab Results   Component Value Date    HGB 10.3 03/19/2021     Lab Results   Component Value Date    HCT 30.6 03/19/2021     Lab Results   Component Value Date    MCV 93 03/19/2021     Lab Results   Component Value Date    MCH 31.2 03/19/2021     Lab Results   Component Value Date    MCHC 33.7  03/19/2021     Lab Results   Component Value Date    RDW 19.6 03/19/2021     Lab Results   Component Value Date     03/19/2021     Thank you for allowing me to participate in the care of your patient.      Sincerely,     Ralph Reeves MD     [facility]  cc:   David Triplett MD  [address] [place] [id number]

## 2022-03-04 NOTE — PROGRESS NOTES
"Vascular Cardiology Consultation      Kari Christian MRN# [id number]   YOB: 1965 Age: 56 year old   Date of Visit 03/04/2022     Reason for consult:  Venous incompetence           Assessment and Plan:     1. Severe left greater saphenous vein incompetence of approximately 3 seconds left lower extremity that corresponds to an area of erythema and congestion in the left lower extremity, refractory to conservative treatment greater than 6 weeks including attempts at compression and elevation    Plan left proximal greater saphenous vein radiofrequency ablation and sclerotherapy of the distal saphenous vein      2. Dyspnea on exertion and chest discomfort    Plan stress testing    40 minutes spent personally today reviewing EMR, decision making, discussing with patient and communication with staff as well as charting.    This note was transcribed using electronic voice recognition software, typographical errors may be present.                Chief Complaint:   Vascular Disease ( Vascular consult )           History of Present Illness:   This patient is a very pleasant 56 year old female that I am meeting for the first time.  She has left greater saphenous vein reflux approximately 3 seconds and an area of induration and erythema on the left lower extremity that correspond to this area.  It is very painful and tender.  It is not responsive to conservative therapy.  Compression stockings induce pain and she cannot tolerate it.  The discomfort is significant and affects her quality of life.  She is concerned about future damage to the tissue.    She also has dyspnea on exertion and atypical chest discomfort.  She has hypotension that precludes diuretics and additional pharmacotherapy.         Physical Exam:     Vitals: BP 98/60 (BP Location: Right arm, Patient Position: Sitting, Cuff Size: Adult Regular)   Pulse 85   Ht 1.676 m (5' 6\")   Wt 71.7 kg (158 lb)   LMP 04/28/2009   SpO2 98%   Breastfeeding No  "  BMI 25.50 kg/m    Constitutional:  cooperative, alert and oriented, well developed, well nourished, in no acute distress        Skin:  warm and dry to the touch        Head:  normocephalic, no masses or lesions        Eyes:  pupils equal and round, conjunctivae and lids unremarkable, sclera white, no xanthalasma, EOMS intact, no nystagmus        ENT:       Equal and reactive    Neck:  JVP normal        Chest:  normal symmetry        Cardiac: regular rhythm                  Abdomen:  BS normoactive        Vascular:                                        Extremities and Back:  no deformities, clubbing, cyanosis, erythema observed        Neurological:  no gross motor deficits;affect appropriate                      Past Medical History:   I have reviewed this patient's past medical history  Past Medical History:   Diagnosis Date     Anemia, unspecified     normal is 7-10     Hereditary spherocytosis (H)     no past transfusion;      Major depressive disorder, single episode, mild (H)      Thyroid disease              Past Surgical History:   I have reviewed this patient's past surgical history  Past Surgical History:   Procedure Laterality Date     CHOLECYSTECTOMY  2007     COLONOSCOPY  09/2014     COLONOSCOPY  01/15/2018    Dr. Wade [facility]     ESOPHAGOSCOPY, GASTROSCOPY, DUODENOSCOPY (EGD), COMBINED N/A 1/15/2018    Procedure: COMBINED ESOPHAGOSCOPY, GASTROSCOPY, DUODENOSCOPY (EGD), BIOPSY SINGLE OR MULTIPLE;  ESOPHAGOSCOPY, GASTROSCOPY, DUODENOSCOPY (EGD) with cold biopsies of duodenum and  COLONOSCOPY with polypectomy;  Surgeon: Chad Wade MD;  Location:  GI     HEAD & NECK SURGERY      wisdom teeth removed               Social History:   I have reviewed this patient's social history  Social History     Tobacco Use     Smoking status: Current Every Day Smoker     Packs/day: 0.30     Years: 20.00     Pack years: 6.00     Types: Cigarettes     Smokeless tobacco: Never Used     Tobacco comment: current  smoker    Substance Use Topics     Alcohol use: Not Currently     Alcohol/week: 2.0 - 3.0 standard drinks     Types: 2 - 3 Standard drinks or equivalent per week     Comment: socially             Family History:   I have reviewed this patient's family history  Family History   Problem Relation Age of Onset     Hypertension Mother      Arthritis Mother      Respiratory Mother         Emphysema     Gastrointestinal Disease Mother      Circulatory Mother      C.A.D. Father 59     Hypertension Father      Cancer Father         Esophageal     Gastrointestinal Disease Father         Gallstones, Gallbladder removal, Colostomy, diverticulitis     Cancer Maternal Grandfather         Kidney     Cancer Paternal Grandfather         Lung, smoker     Breast Cancer Sister      Thyroid Disease Sister      Cancer - colorectal No family hx of      Colon Cancer No family hx of              Allergies:     Allergies   Allergen Reactions     Cephalexin Itching     Sulfa Drugs              Medications:   I have reviewed this patient's current medications  Current Outpatient Medications   Medication Sig Dispense Refill     albuterol (PROAIR HFA/PROVENTIL HFA/VENTOLIN HFA) 108 (90 Base) MCG/ACT inhaler Inhale 2 puffs into the lungs every 6 hours as needed for shortness of breath / dyspnea 18 g 3     FOLIC ACID PO Take 1 mg by mouth as needed        buPROPion (WELLBUTRIN XL) 150 MG 24 hr tablet Take 1 tablet (150 mg) by mouth every morning (Patient not taking: Reported on 12/3/2021) 90 tablet 1     doxycycline hyclate (VIBRA-TABS) 100 MG tablet Take 1 tablet (100 mg) by mouth 2 times daily (Patient not taking: Reported on 12/3/2021) 20 tablet 0     OMEPRAZOLE PO Take by mouth as needed (Patient not taking: Reported on 12/3/2021)       traZODone (DESYREL) 50 MG tablet Take 1 tablet (50 mg) by mouth At Bedtime (Patient not taking: Reported on 12/3/2021) 90 tablet 3               Review of Systems:       Review of Systems:  Skin:  Negative      Eyes:  Negative    ENT:  Negative    Respiratory:  Positive for dyspnea on exertion;shortness of breath  Cardiovascular:    palpitations;Positive for  Gastroenterology: Negative    Genitourinary:  Negative    Musculoskeletal:  Negative    Neurologic:  Negative    Psychiatric:  Negative    Heme/Lymph/Imm:  Negative    Endocrine:  Negative                       Data:   All laboratory data reviewed  Lab Results   Component Value Date    CHOL 151 03/19/2021     Lab Results   Component Value Date    HDL 58 03/19/2021     Lab Results   Component Value Date    LDL 77 03/19/2021     Lab Results   Component Value Date    TRIG 78 03/19/2021     Lab Results   Component Value Date    CHOLHDLRATIO 2.0 01/19/2015     TSH   Date Value Ref Range Status   03/19/2021 0.31 (L) 0.40 - 4.00 mU/L Final     Last Basic Metabolic Panel:  Lab Results   Component Value Date     03/19/2021      Lab Results   Component Value Date    POTASSIUM 3.9 03/19/2021     Lab Results   Component Value Date    CHLORIDE 108 03/19/2021     Lab Results   Component Value Date    ELLIE 8.3 03/19/2021     Lab Results   Component Value Date    CO2 30 03/19/2021     Lab Results   Component Value Date    BUN 18 03/19/2021     Lab Results   Component Value Date    CR 0.67 03/19/2021     Lab Results   Component Value Date    GLC 72 03/19/2021     Lab Results   Component Value Date    WBC 7.0 03/19/2021     Lab Results   Component Value Date    RBC 3.30 03/19/2021     Lab Results   Component Value Date    HGB 10.3 03/19/2021     Lab Results   Component Value Date    HCT 30.6 03/19/2021     Lab Results   Component Value Date    MCV 93 03/19/2021     Lab Results   Component Value Date    MCH 31.2 03/19/2021     Lab Results   Component Value Date    MCHC 33.7 03/19/2021     Lab Results   Component Value Date    RDW 19.6 03/19/2021     Lab Results   Component Value Date     03/19/2021

## 2022-04-11 ENCOUNTER — NURSE TRIAGE (OUTPATIENT)
Dept: INTERNAL MEDICINE | Facility: CLINIC | Age: 57
End: 2022-04-11
Payer: COMMERCIAL

## 2022-04-11 NOTE — TELEPHONE ENCOUNTER
Patient returned call to nurse triage line.    Patient is concerned that she has an infection since a family member had diverticulitis and had similar symptoms. Patient has chills but has not actually checked for a fever. Patient has passed small amounts of stool. Patient advised that chills can be a vasovagal response and patient should check temperature. Patient advised to call back if needed.

## 2022-04-11 NOTE — TELEPHONE ENCOUNTER
Nurse Triage SBAR    Is this a 2nd Level Triage? NO    Situation: abdominal pain twice overnight, not present right now, but feels a burning in her stomach    Background: Patient has not been feeling well for the past 3-4 days with fatigue, chills and feeling groggy. Last night she developed sudden severe abdominal pain with a burning that went through her body. Pain was located in the upper center of her abdomen, lasted about 10 minutes and improved once she laid down. Pain then came back about 1 hour later and again lasted about 10 minutes. Her stomach feels bloated, no stool for 24 hours. Nauseated and sweaty with pain. She does have history of acid reflux and took TUMS this morning, so far pain has not come back. She is asking for recommendations to help have a bowel movement to see if this helps with symptoms.     Assessment: She felt very nauseated and was chilled and sweating while the pain occurred last night. She is feeling like she has indigestion and is burping more this morning. No known fevers.  She has IBS which normally causes loose stools and does not have issues with constipation.     Protocol Recommended Disposition:   Home Care    Recommendation: home care advise given for constipation. Advised patient to go to ER if severe pain returns, having constant abdominal pain for 1-2 hours, vomiting or fevers.     Does the patient meet one of the following criteria for ADS visit consideration? 16+ years old, with an [facility] PCP     TIP  Providers, please consider if this condition is appropriate for management at one of our Acute and Diagnostic Services sites.     If patient is a good candidate, please use dotphrase <dot>triageresponse and select Refer to ADS to document.       Rosibel Hernandez RN  [facility]      Reason for Disposition    Mild constipation    Additional Information    Negative: Passed out (i.e., fainted, collapsed and was not responding)    Negative: Shock  suspected (e.g., cold/pale/clammy skin, too weak to stand, low BP, rapid pulse)    Negative: Sounds like a life-threatening emergency to the triager    Negative: Chest pain    Negative: Pain is mainly in upper abdomen (if needed ask: 'is it mainly above the belly button?')    Negative: Abdominal pain and pregnant > 20 weeks    Negative: Abdominal pain and pregnant < 20 weeks    Negative: SEVERE abdominal pain (e.g., excruciating)    Negative: Vomiting red blood or black (coffee ground) material    Negative: Bloody, black, or tarry bowel movements (Exception: chronic-unchanged black-grey bowel movements and is taking iron pills or Pepto-bismol)    Negative: Constant abdominal pain lasting > 2 hours    Negative: Vomiting bile (green color)    Negative: Patient sounds very sick or weak to the triager    Negative: Vomiting and abdomen looks much more swollen than usual    Negative: White of the eyes have turned yellow (i.e., jaundice)    Negative: Blood in urine (red, pink, or tea-colored)    Negative: Fever > 103 F (39.4 C)    Negative: Fever > 101 F (38.3 C) and over 60 years of age    Negative: Fever > 100.0 F (37.8 C) and has diabetes mellitus or a weak immune system (e.g., HIV positive, cancer chemotherapy, organ transplant, splenectomy, chronic steroids)    Negative: Fever > 100.0 F (37.8 C) and bedridden (e.g., nursing home patient, stroke, chronic illness, recovering from surgery)    Negative: Pregnant or could be pregnant (i.e., missed last menstrual period)    Negative: MODERATE OR MILD pain that comes and goes (cramps) lasts > 24 hours    Negative: Unusual vaginal discharge    Negative: Age > 60 years    Negative: Patient wants to be seen    Negative: Rectal pain or fullness from fecal impaction (rectum full of stool) and NOT better after SITZ bath, suppository or enema    Negative: Abdomen is more swollen than usual    Negative: Last bowel movement (BM) > 4 days ago    Negative: Leaking stool    Negative:  "Intermittent mild abdominal pain and fever    Negative: Unable to have a bowel movement (BM) without manually removing stool (using finger to pull out stool or perform disimpaction)    Negative: Unable to have a bowel movement (BM) without using a laxative, suppository, or enema    Negative: Constipation persists > 1 week and no improvement after using CARE ADVICE    Negative: Weight loss greater than 10 pounds (5 kg) and not dieting    Negative: Pencil-like, narrow stools    Negative: Patient wants to be seen    Negative: Uses laxative (e.g., PEG / Miralax. milk of magnesia) or enema more than once a month    Negative: Constipation is a recurrent ongoing problem (i.e., < 3 BMs / week or straining > 25% of the time)    Negative: Minor bleeding from rectum (e.g., blood just on toilet paper, few drops, streaks on surface of normal formed BM) occurs more than twice    Answer Assessment - Initial Assessment Questions  1. LOCATION: \"Where does it hurt?\"       Center upper abdomen  2. RADIATION: \"Does the pain shoot anywhere else?\" (e.g., chest, back)      no  3. ONSET: \"When did the pain begin?\" (e.g., minutes, hours or days ago)       Last night  4. SUDDEN: \"Gradual or sudden onset?\"      sudden  5. PATTERN \"Does the pain come and go, or is it constant?\"     - If constant: \"Is it getting better, staying the same, or worsening?\"       (Note: Constant means the pain never goes away completely; most serious pain is constant and it progresses)      - If intermittent: \"How long does it last?\" \"Do you have pain now?\"      (Note: Intermittent means the pain goes away completely between bouts)      Intermittent, lasted about 10 minutes  6. SEVERITY: \"How bad is the pain?\"  (e.g., Scale 1-10; mild, moderate, or severe)    - MILD (1-3): doesn't interfere with normal activities, abdomen soft and not tender to touch     - MODERATE (4-7): interferes with normal activities or awakens from sleep, tender to touch     - SEVERE (8-10): " "excruciating pain, doubled over, unable to do any normal activities       Severe  7. RECURRENT SYMPTOM: \"Have you ever had this type of abdominal pain before?\" If so, ask: \"When was the last time?\" and \"What happened that time?\"       Yes, similar to when she had gall stones - gall bladder removed. She did have pain about a year ago as well, but cannot remember if this was related to constipation and never had it checked out  8. CAUSE: \"What do you think is causing the abdominal pain?\"      Concerned it could be from constipation  9. RELIEVING/AGGRAVATING FACTORS: \"What makes it better or worse?\" (e.g., movement, antacids, bowel movement)      Felt better when laying down, took TUMS and no severe pain this morning so far  10. OTHER SYMPTOMS: \"Has there been any vomiting, diarrhea, constipation, or urine problems?\"        Constipation, bloating  11. PREGNANCY: \"Is there any chance you are pregnant?\" \"When was your last menstrual period?\"        no    Protocols used: CONSTIPATION-A-OH, ABDOMINAL PAIN - FEMALE-A-OH      "

## 2022-04-14 ENCOUNTER — HOSPITAL ENCOUNTER (OUTPATIENT)
Dept: CARDIOLOGY | Facility: CLINIC | Age: 57
Discharge: HOME | End: 2022-04-14
Attending: INTERNAL MEDICINE | Admitting: INTERNAL MEDICINE
Payer: COMMERCIAL

## 2022-04-14 DIAGNOSIS — R06.02 SOB (SHORTNESS OF BREATH): ICD-10-CM

## 2022-04-14 DIAGNOSIS — R07.2 PRECORDIAL PAIN: ICD-10-CM

## 2022-04-14 PROCEDURE — 93325 DOPPLER ECHO COLOR FLOW MAPG: CPT | Mod: 26 | Performed by: INTERNAL MEDICINE

## 2022-04-14 PROCEDURE — 93321 DOPPLER ECHO F-UP/LMTD STD: CPT | Mod: 26 | Performed by: INTERNAL MEDICINE

## 2022-04-14 PROCEDURE — 93016 CV STRESS TEST SUPVJ ONLY: CPT | Performed by: INTERNAL MEDICINE

## 2022-04-14 PROCEDURE — 93325 DOPPLER ECHO COLOR FLOW MAPG: CPT | Mod: TC

## 2022-04-14 PROCEDURE — 93018 CV STRESS TEST I&R ONLY: CPT | Performed by: INTERNAL MEDICINE

## 2022-04-14 PROCEDURE — 93350 STRESS TTE ONLY: CPT | Mod: 26 | Performed by: INTERNAL MEDICINE

## 2022-04-19 ENCOUNTER — TELEPHONE (OUTPATIENT)
Dept: CARDIOLOGY | Facility: CLINIC | Age: 57
End: 2022-04-19
Payer: COMMERCIAL

## 2022-04-19 NOTE — TELEPHONE ENCOUNTER
----- Message from Meron Taylor sent at 4/19/2022  3:24 PM CDT -----  Regarding: Test results  I spoke with this pt today and she said she is still waiting for someone to call her with her test results. Thank you!    Lala    Result note from Dr. Reeves:    The heart looked very good on the stress test!    Contacted patient to review results and Dr. Reeves's comments. Patient did not answer. Left detailed message with stress test results. Instructed patient to call back with any further questions.

## 2022-05-27 ENCOUNTER — OFFICE VISIT (OUTPATIENT)
Dept: INTERNAL MEDICINE | Facility: CLINIC | Age: 57
End: 2022-05-27
Payer: COMMERCIAL

## 2022-05-27 VITALS
WEIGHT: 157 LBS | OXYGEN SATURATION: 99 % | DIASTOLIC BLOOD PRESSURE: 56 MMHG | BODY MASS INDEX: 25.23 KG/M2 | TEMPERATURE: 97.9 F | SYSTOLIC BLOOD PRESSURE: 98 MMHG | HEIGHT: 66 IN | RESPIRATION RATE: 20 BRPM | HEART RATE: 80 BPM

## 2022-05-27 DIAGNOSIS — L98.9 SKIN LESION: ICD-10-CM

## 2022-05-27 DIAGNOSIS — D17.30 LIPOMA OF SKIN AND SUBCUTANEOUS TISSUE: ICD-10-CM

## 2022-05-27 DIAGNOSIS — Z12.11 SCREEN FOR COLON CANCER: ICD-10-CM

## 2022-05-27 DIAGNOSIS — F33.0 MILD EPISODE OF RECURRENT MAJOR DEPRESSIVE DISORDER (H): ICD-10-CM

## 2022-05-27 DIAGNOSIS — F32.0 MAJOR DEPRESSIVE DISORDER, SINGLE EPISODE, MILD (H): ICD-10-CM

## 2022-05-27 DIAGNOSIS — J45.30 MILD PERSISTENT ASTHMA, UNSPECIFIED WHETHER COMPLICATED: ICD-10-CM

## 2022-05-27 DIAGNOSIS — Z11.4 SCREENING FOR HIV (HUMAN IMMUNODEFICIENCY VIRUS): ICD-10-CM

## 2022-05-27 DIAGNOSIS — K21.00 GASTROESOPHAGEAL REFLUX DISEASE WITH ESOPHAGITIS WITHOUT HEMORRHAGE: ICD-10-CM

## 2022-05-27 DIAGNOSIS — R53.83 OTHER FATIGUE: ICD-10-CM

## 2022-05-27 DIAGNOSIS — Z00.00 ROUTINE GENERAL MEDICAL EXAMINATION AT A HEALTH CARE FACILITY: Primary | ICD-10-CM

## 2022-05-27 DIAGNOSIS — Z71.6 ENCOUNTER FOR SMOKING CESSATION COUNSELING: ICD-10-CM

## 2022-05-27 DIAGNOSIS — Z87.891 PERSONAL HISTORY OF TOBACCO USE: ICD-10-CM

## 2022-05-27 DIAGNOSIS — D58.0 HEREDITARY SPHEROCYTOSIS (H): ICD-10-CM

## 2022-05-27 DIAGNOSIS — Z12.31 VISIT FOR SCREENING MAMMOGRAM: ICD-10-CM

## 2022-05-27 DIAGNOSIS — F41.9 ANXIETY: ICD-10-CM

## 2022-05-27 DIAGNOSIS — R06.02 SOB (SHORTNESS OF BREATH): ICD-10-CM

## 2022-05-27 DIAGNOSIS — H91.92 DEAFNESS, LEFT: ICD-10-CM

## 2022-05-27 DIAGNOSIS — Z72.0 TOBACCO ABUSE: ICD-10-CM

## 2022-05-27 DIAGNOSIS — Z11.59 NEED FOR HEPATITIS C SCREENING TEST: ICD-10-CM

## 2022-05-27 DIAGNOSIS — Z12.4 CERVICAL CANCER SCREENING: ICD-10-CM

## 2022-05-27 DIAGNOSIS — E55.9 VITAMIN D DEFICIENCY: ICD-10-CM

## 2022-05-27 LAB
BASOPHILS # BLD AUTO: 0.1 10E3/UL (ref 0–0.2)
BASOPHILS NFR BLD AUTO: 1 %
EOSINOPHIL # BLD AUTO: 0.4 10E3/UL (ref 0–0.7)
EOSINOPHIL NFR BLD AUTO: 5 %
ERYTHROCYTE [DISTWIDTH] IN BLOOD BY AUTOMATED COUNT: 19.7 % (ref 10–15)
HCT VFR BLD AUTO: 32.2 % (ref 35–47)
HGB BLD-MCNC: 11.2 G/DL (ref 11.7–15.7)
IMM GRANULOCYTES # BLD: 0 10E3/UL
IMM GRANULOCYTES NFR BLD: 0 %
LYMPHOCYTES # BLD AUTO: 2.2 10E3/UL (ref 0.8–5.3)
LYMPHOCYTES NFR BLD AUTO: 27 %
MCH RBC QN AUTO: 31.1 PG (ref 26.5–33)
MCHC RBC AUTO-ENTMCNC: 34.8 G/DL (ref 31.5–36.5)
MCV RBC AUTO: 89 FL (ref 78–100)
MONOCYTES # BLD AUTO: 0.6 10E3/UL (ref 0–1.3)
MONOCYTES NFR BLD AUTO: 7 %
NEUTROPHILS # BLD AUTO: 5.1 10E3/UL (ref 1.6–8.3)
NEUTROPHILS NFR BLD AUTO: 61 %
PLATELET # BLD AUTO: 230 10E3/UL (ref 150–450)
RBC # BLD AUTO: 3.6 10E6/UL (ref 3.8–5.2)
WBC # BLD AUTO: 8.3 10E3/UL (ref 4–11)

## 2022-05-27 PROCEDURE — 36415 COLL VENOUS BLD VENIPUNCTURE: CPT | Performed by: NURSE PRACTITIONER

## 2022-05-27 PROCEDURE — 82306 VITAMIN D 25 HYDROXY: CPT | Performed by: NURSE PRACTITIONER

## 2022-05-27 PROCEDURE — 99214 OFFICE O/P EST MOD 30 MIN: CPT | Mod: 25 | Performed by: NURSE PRACTITIONER

## 2022-05-27 PROCEDURE — 87389 HIV-1 AG W/HIV-1&-2 AB AG IA: CPT | Performed by: NURSE PRACTITIONER

## 2022-05-27 PROCEDURE — 99396 PREV VISIT EST AGE 40-64: CPT | Performed by: NURSE PRACTITIONER

## 2022-05-27 PROCEDURE — 80050 GENERAL HEALTH PANEL: CPT | Performed by: NURSE PRACTITIONER

## 2022-05-27 PROCEDURE — 96127 BRIEF EMOTIONAL/BEHAV ASSMT: CPT | Performed by: NURSE PRACTITIONER

## 2022-05-27 PROCEDURE — 87624 HPV HI-RISK TYP POOLED RSLT: CPT | Performed by: NURSE PRACTITIONER

## 2022-05-27 PROCEDURE — G0145 SCR C/V CYTO,THINLAYER,RESCR: HCPCS | Performed by: NURSE PRACTITIONER

## 2022-05-27 PROCEDURE — 80061 LIPID PANEL: CPT | Performed by: NURSE PRACTITIONER

## 2022-05-27 PROCEDURE — 86803 HEPATITIS C AB TEST: CPT | Performed by: NURSE PRACTITIONER

## 2022-05-27 RX ORDER — BUDESONIDE AND FORMOTEROL FUMARATE DIHYDRATE 160; 4.5 UG/1; UG/1
2 AEROSOL RESPIRATORY (INHALATION) 2 TIMES DAILY
Qty: 10.2 G | Refills: 11 | Status: SHIPPED | OUTPATIENT
Start: 2022-05-27 | End: 2024-01-04

## 2022-05-27 RX ORDER — ALBUTEROL SULFATE 90 UG/1
2 AEROSOL, METERED RESPIRATORY (INHALATION) EVERY 6 HOURS PRN
Qty: 18 G | Refills: 11 | Status: SHIPPED | OUTPATIENT
Start: 2022-05-27 | End: 2024-01-04

## 2022-05-27 ASSESSMENT — ANXIETY QUESTIONNAIRES
2. NOT BEING ABLE TO STOP OR CONTROL WORRYING: MORE THAN HALF THE DAYS
7. FEELING AFRAID AS IF SOMETHING AWFUL MIGHT HAPPEN: SEVERAL DAYS
GAD7 TOTAL SCORE: 9
7. FEELING AFRAID AS IF SOMETHING AWFUL MIGHT HAPPEN: SEVERAL DAYS
1. FEELING NERVOUS, ANXIOUS, OR ON EDGE: MORE THAN HALF THE DAYS
6. BECOMING EASILY ANNOYED OR IRRITABLE: SEVERAL DAYS
4. TROUBLE RELAXING: SEVERAL DAYS
GAD7 TOTAL SCORE: 9
GAD7 TOTAL SCORE: 9
8. IF YOU CHECKED OFF ANY PROBLEMS, HOW DIFFICULT HAVE THESE MADE IT FOR YOU TO DO YOUR WORK, TAKE CARE OF THINGS AT HOME, OR GET ALONG WITH OTHER PEOPLE?: VERY DIFFICULT
5. BEING SO RESTLESS THAT IT IS HARD TO SIT STILL: SEVERAL DAYS
3. WORRYING TOO MUCH ABOUT DIFFERENT THINGS: SEVERAL DAYS

## 2022-05-27 ASSESSMENT — ENCOUNTER SYMPTOMS
CONSTIPATION: 1
FREQUENCY: 1
ARTHRALGIAS: 1
BREAST MASS: 0
DIZZINESS: 1
JOINT SWELLING: 1
PARESTHESIAS: 1
WEAKNESS: 1
HEARTBURN: 1
DIARRHEA: 1
ABDOMINAL PAIN: 1
CHILLS: 1
NERVOUS/ANXIOUS: 1
SHORTNESS OF BREATH: 1
MYALGIAS: 1

## 2022-05-27 ASSESSMENT — PATIENT HEALTH QUESTIONNAIRE - PHQ9
10. IF YOU CHECKED OFF ANY PROBLEMS, HOW DIFFICULT HAVE THESE PROBLEMS MADE IT FOR YOU TO DO YOUR WORK, TAKE CARE OF THINGS AT HOME, OR GET ALONG WITH OTHER PEOPLE: VERY DIFFICULT
SUM OF ALL RESPONSES TO PHQ QUESTIONS 1-9: 10
SUM OF ALL RESPONSES TO PHQ QUESTIONS 1-9: 10

## 2022-05-27 NOTE — NURSING NOTE
"Chief Complaint   Patient presents with     Physical     pap     initial BP 98/56   Pulse 80   Temp 97.9  F (36.6  C) (Oral)   Resp 20   Ht 1.676 m (5' 6\")   Wt 71.2 kg (157 lb)   LMP 04/28/2009   SpO2 99%   BMI 25.34 kg/m   Estimated body mass index is 25.34 kg/m  as calculated from the following:    Height as of this encounter: 1.676 m (5' 6\").    Weight as of this encounter: 71.2 kg (157 lb)..  bp completed using cuff size regular  KATYH KRUSE LPN  "

## 2022-05-27 NOTE — LETTER
[place] [address], [address]  [place] [address]  [phone]        June 2, 2022    Kari ROWDY Christian  [address] [facility] [address]            Dear Ms. Kari Christian:      The results of your recent hemoglobin has improved.     Vitamin d low  Vit d 4000 units daily      Bilirubin is chronically elevated.   The bilirubin is related to your spherocytosis.  It would be good to check in with hematology.  The bilirubin has been stable for years.    [facility]   [address]  [address]   [facility] [address]   Phone: [phone]   Fax: [phone]     If you have any further questions or problems, please contact our office.    Sincerely,        Blessing Ling N.P.ELODIA    Results for orders placed or performed in visit on 05/27/22   HIV Antigen Antibody Combo     Status: Normal   Result Value Ref Range    HIV Antigen Antibody Combo Nonreactive Nonreactive   Hepatitis C Screen Reflex to HCV RNA Quant and Genotype     Status: Normal   Result Value Ref Range    Hepatitis C Antibody Nonreactive Nonreactive    Narrative    Assay performance characteristics have not been established for newborns, infants, and children.   Lipid panel reflex to direct LDL Fasting     Status: None   Result Value Ref Range    Cholesterol 162 <200 mg/dL    Triglycerides 108 <150 mg/dL    Direct Measure HDL 62 >=50 mg/dL    LDL Cholesterol Calculated 78 <=100 mg/dL    Non HDL Cholesterol 100 <130 mg/dL    Patient Fasting > 8hrs? No     Narrative    Cholesterol  Desirable:  <200 mg/dL    Triglycerides  Normal:  Less than 150 mg/dL  Borderline High:  150-199 mg/dL  High:  200-499 mg/dL  Very High:  Greater than or equal to 500 mg/dL    Direct Measure HDL  Female:  Greater than or equal to 50 mg/dL   Male:  Greater than or equal to 40 mg/dL    LDL Cholesterol  Desirable:  <100mg/dL  Above Desirable:  100-129 mg/dL   Borderline High:  130-159 mg/dL   High:   160-189 mg/dL   Very High:  >= 190 mg/dL    Non HDL Cholesterol  Desirable:  130 mg/dL  Above Desirable:  130-159 mg/dL  Borderline High:  160-189 mg/dL  High:  190-219 mg/dL  Very High:  Greater than or equal to 220 mg/dL   Comprehensive metabolic panel (BMP + Alb, Alk Phos, ALT, AST, Total. Bili, TP)     Status: Abnormal   Result Value Ref Range    Sodium 139 133 - 144 mmol/L    Potassium 3.7 3.4 - 5.3 mmol/L    Chloride 107 94 - 109 mmol/L    Carbon Dioxide (CO2) 28 20 - 32 mmol/L    Anion Gap 4 3 - 14 mmol/L    Urea Nitrogen 10 7 - 30 mg/dL    Creatinine 0.66 0.52 - 1.04 mg/dL    Calcium 8.7 8.5 - 10.1 mg/dL    Glucose 87 70 - 99 mg/dL    Alkaline Phosphatase 88 40 - 150 U/L    AST 15 0 - 45 U/L    ALT 21 0 - 50 U/L    Protein Total 7.5 6.8 - 8.8 g/dL    Albumin 4.1 3.4 - 5.0 g/dL    Bilirubin Total 2.2 (H) 0.2 - 1.3 mg/dL    GFR Estimate >90 >60 mL/min/1.73m2   TSH with free T4 reflex     Status: Normal   Result Value Ref Range    TSH 0.42 0.40 - 4.00 mU/L   Vitamin D Deficiency     Status: Abnormal   Result Value Ref Range    Vitamin D, Total (25-Hydroxy) 17 (L) 20 - 75 ug/L    Narrative    Season, race, dietary intake, and treatment affect the concentration of 25-hydroxy-Vitamin D. Values may decrease during winter months and increase during summer months. Values 20-29 ug/L may indicate Vitamin D insufficiency and values <20 ug/L may indicate Vitamin D deficiency.    Vitamin D determination is routinely performed by an immunoassay specific for 25 hydroxyvitamin D3.  If an individual is on vitamin D2(ergocalciferol) supplementation, please specify 25 OH vitamin D2 and D3 level determination by LCMSMS test VITD23.     CBC with platelets and differential     Status: Abnormal   Result Value Ref Range    WBC Count 8.3 4.0 - 11.0 10e3/uL    RBC Count 3.60 (L) 3.80 - 5.20 10e6/uL    Hemoglobin 11.2 (L) 11.7 - 15.7 g/dL    Hematocrit 32.2 (L) 35.0 - 47.0 %    MCV 89 78 - 100 fL    MCH 31.1 26.5 - 33.0 pg    MCHC  34.8 31.5 - 36.5 g/dL    RDW 19.7 (H) 10.0 - 15.0 %    Platelet Count 230 150 - 450 10e3/uL    % Neutrophils 61 %    % Lymphocytes 27 %    % Monocytes 7 %    % Eosinophils 5 %    % Basophils 1 %    % Immature Granulocytes 0 %    Absolute Neutrophils 5.1 1.6 - 8.3 10e3/uL    Absolute Lymphocytes 2.2 0.8 - 5.3 10e3/uL    Absolute Monocytes 0.6 0.0 - 1.3 10e3/uL    Absolute Eosinophils 0.4 0.0 - 0.7 10e3/uL    Absolute Basophils 0.1 0.0 - 0.2 10e3/uL    Absolute Immature Granulocytes 0.0 <=0.4 10e3/uL   Pap Screen with HPV - recommended age 30 - 65 years     Status: None   Result Value Ref Range    Interpretation        Negative for Intraepithelial Lesion or Malignancy (NILM)    Comment         Papanicolaou Test Limitations:  Cervical cytology is a screening test with limited sensitivity, and regular screening is critical for cancer prevention.  Pap tests are primarily effective for the diagnosis/prevention of squamous cell carcinoma, not adenocarcinoma or other cancers.        Specimen Adequacy       Satisfactory for evaluation, endocervical/transformation zone component present    Clinical Information       none      Reflex Testing Yes regardless of result     Previous Abnormal?       No      Performing Labs       The technical component of this testing was completed at [facility]     CBC with platelets and differential     Status: Abnormal    Narrative    The following orders were created for panel order CBC with platelets and differential.  Procedure                               Abnormality         Status                     ---------                               -----------         ------                     CBC with platelets and d...[[id number]]  Abnormal            Final result                 Please view results for these tests on the individual orders.

## 2022-05-27 NOTE — PROGRESS NOTES
SUBJECTIVE:   CC: Kari Christian is an 56 year old woman who presents for preventive health visit.         Healthy Habits:     Getting at least 3 servings of Calcium per day:  NO    Bi-annual eye exam:  NO    Dental care twice a year:  Yes    Sleep apnea or symptoms of sleep apnea:  Excessive snoring    Diet:  Regular (no restrictions)    Frequency of exercise:  2-3 days/week    Duration of exercise:  15-30 minutes    Taking medications regularly:  Yes    Medication side effects:  Not applicable    PHQ-2 Total Score: 4    Additional concerns today:  Yes              Today's PHQ-2 Score:   PHQ-2 ( 1999 Pfizer) 5/27/2022   Q1: Little interest or pleasure in doing things 2   Q2: Feeling down, depressed or hopeless 2   PHQ-2 Score 4   PHQ-2 Total Score (12-17 Years)- Positive if 3 or more points; Administer PHQ-A if positive -   Q1: Little interest or pleasure in doing things More than half the days   Q2: Feeling down, depressed or hopeless More than half the days   PHQ-2 Score 4       Abuse: Current or Past (Physical, Sexual or Emotional) - No  Do you feel safe in your environment? Yes        Social History     Tobacco Use     Smoking status: Current Every Day Smoker     Packs/day: 0.30     Years: 40.00     Pack years: 12.00     Types: Cigarettes     Smokeless tobacco: Never Used     Tobacco comment: current smoker  40 years at 2022   Substance Use Topics     Alcohol use: Not Currently     Alcohol/week: 2.0 - 3.0 standard drinks     Types: 2 - 3 Standard drinks or equivalent per week     Comment: socially         Alcohol Use 5/27/2022   Prescreen: >3 drinks/day or >7 drinks/week? No   Prescreen: >3 drinks/day or >7 drinks/week? -       Reviewed orders with patient.  Reviewed health maintenance and updated orders accordingly - Yes      Breast Cancer Screening:  Any new diagnosis of family breast, ovarian, or bowel cancer?     FHS-7:   Breast CA Risk Assessment (FHS-7) 5/27/2022   Did any of your first-degree relatives  have breast or ovarian cancer? Yes   Did any of your relatives have bilateral breast cancer? No   Did any man in your family have breast cancer? Yes   Did any woman in your family have breast and ovarian cancer? Yes   Did any woman in your family have breast cancer before age 50 y? Yes   Do you have 2 or more relatives with breast and/or ovarian cancer? No   Do you have 2 or more relatives with breast and/or bowel cancer? No         Pertinent mammograms are reviewed under the imaging tab.    History of abnormal Pap smear: LEEP when she was 18      Reviewed and updated as needed this visit by clinical staff   Tobacco  Allergies  Meds  Problems  Med Hx  Surg Hx  Fam Hx  Soc   Hx          Reviewed and updated as needed this visit by Provider                       Review of Systems   Constitutional: Positive for chills.   HENT: Positive for congestion and hearing loss.    Eyes: Positive for visual disturbance.   Respiratory: Positive for shortness of breath.    Gastrointestinal: Positive for abdominal pain, constipation, diarrhea and heartburn.   Breasts:  Negative for tenderness, breast mass and discharge.   Genitourinary: Positive for frequency, genital sores and urgency. Negative for pelvic pain, vaginal bleeding and vaginal discharge.   Musculoskeletal: Positive for arthralgias, joint swelling and myalgias.   Neurological: Positive for dizziness, weakness and paresthesias.   Psychiatric/Behavioral: Positive for mood changes. The patient is nervous/anxious.      Family history of ling cancer paternal grandpa age 59   Father with esophageal cancer - smoke and drank alcohol   Mets to lung     Will do CT scan for smoking     Mood off - side effect with all medication she has tried   Does not want to try medication   Try to walk more to help   Needs breathing to be better     Vision change - needs new prescription     Urine frequency and urgency - has to go quickly when she goes and IBS     OB GYN checked one bump  "- cyst - pubic hair issues - come and go    Deaf left ear   Ringing first and then stopped hearing   Went to ER   ENT saw - too late - steroid shot in ear and nothing helped   Major effect life     Works form home   When in a room her hearing is bad and ringing increases   smokes 40 pack year   Was a drinker of alcohol but has intolerance now   Wine causes rash and red face   Beer causes her to be sick to stomach   So does not drink any more     hgb low because of spherocytosis   Folic acid     Chills at times - no fever   Fatigue     Abdominal pain - severe   Constipated- better after stooling   colonooscopy age 48 2014 adenoma polyp - 5 years colonoscopy- hyperplastic polyp - due 2023  IBS- not changed     Needs root canal - said ongoing infection per dentist   Salty in mouth taste     Occasionally takes tums or rolaids or pepto  Not taking omeprazole - worried it was like zantac   Will restart omeprazole as has daily GERD symptoms   EGD 2018    Weight elevated   Gained weight over time   Wt Readings from Last 4 Encounters:   05/27/22 71.2 kg (157 lb)   03/04/22 71.7 kg (158 lb)   12/03/21 71 kg (156 lb 8 oz)   03/19/21 70.2 kg (154 lb 12.8 oz)     Working at [employer] - exhausted after working 4 hours     Stress test April was ok     Declined covid   Declined TDAP     Lipoma upper left thigh   OBJECTIVE:   BP 98/56   Pulse 80   Temp 97.9  F (36.6  C) (Oral)   Resp 20   Ht 1.676 m (5' 6\")   Wt 71.2 kg (157 lb)   LMP 04/28/2009   SpO2 99%   BMI 25.34 kg/m    Physical Exam  GENERAL: alert and no distress- many complaints about her health   EYES: Eyes grossly normal to inspection,  and conjunctivae and sclerae normal  HENT: ear canals and TM's normal, nose and mouth without ulcers or lesions  NECK: no adenopathy, no asymmetry, masses, or scars and thyroid normal to palpation  RESP: lungs clear to auscultation - no rales, rhonchi or wheezes  BREAST: normal without masses, tenderness or nipple discharge and " no palpable axillary masses or adenopathy  CV: regular rate and rhythm, normal S1 S2, no S3 or S4, no murmur, click or rub, no peripheral edema and peripheral pulses strong  ABDOMEN: soft, nontender, no hepatosplenomegaly, no masses and bowel sounds normal   (female): normal female external genitalia, normal urethral meatus, vaginal mucosa pink, moist, well rugated, and normal cervix/adnexa/uterus without masses or discharge  MS: no gross musculoskeletal defects noted, no edema  SKIN: lipoma like lesion at thigh top left side   NEURO: Normal strength and tone, mentation intact and speech normal  PSYCH: mentation appears normal, affect normal/bright    Diagnostic Test Results:  Labs reviewed in Epic  Lab   PAP  mammogram    ASSESSMENT/PLAN:   (Z00.00) Routine general medical examination at a health care facility  (primary encounter diagnosis)  Comment:   Plan: MA SCREENING DIGITAL BILAT - Future  (s+30),         Lipid panel reflex to direct LDL Fasting,         Comprehensive metabolic panel (BMP + Alb, Alk         Phos, ALT, AST, Total. Bili, TP), TSH with free        T4 reflex, CBC with platelets and differential            (J45.30) Mild persistent asthma, unspecified whether complicated  Comment: never diagnosed but albuterol no longer working well   Willing to see pulmonology and have breathing tests   Start symbicort   Summer is her worse time   She is still smoking and does not want to do anything at this time  Plan: Adult Pulmonary Medicine Referral,         budesonide-formoterol (SYMBICORT) 160-4.5         MCG/ACT Inhaler, albuterol (PROAIR         HFA/PROVENTIL HFA/VENTOLIN HFA) 108 (90 Base)         MCG/ACT inhaler            (R06.02) SOB (shortness of breath)  Comment:   Plan: albuterol (PROAIR HFA/PROVENTIL HFA/VENTOLIN         HFA) 108 (90 Base) MCG/ACT inhaler            (F32.0) Major depressive disorder, single episode, mild (H)  Comment: has tried things in past and wants to do genetic testing for  medication choice   Plan: Comprehensive metabolic panel (BMP + Alb, Alk         Phos, ALT, AST, Total. Bili, TP), TSH with free        T4 reflex, CBC with platelets and differential            (Z12.31) Visit for screening mammogram  Comment:   Plan: MA SCREENING DIGITAL BILAT - Future  (s+30)            (Z71.6) Encounter for smoking cessation counseling  Comment:   Plan: Prof fee: Shared Decision Making for Lung         Cancer Screening, CT Chest Lung Cancer Scrn Low        Dose wo, Adult Pulmonary Medicine Referral,         albuterol (PROAIR HFA/PROVENTIL HFA/VENTOLIN         HFA) 108 (90 Base) MCG/ACT inhaler            (Z87.891) Personal history of tobacco use  Comment:   Plan: Prof fee: Shared Decision Making for Lung         Cancer Screening, CT Chest Lung Cancer Scrn Low        Dose wo, Adult Pulmonary Medicine Referral,         albuterol (PROAIR HFA/PROVENTIL HFA/VENTOLIN         HFA) 108 (90 Base) MCG/ACT inhaler            (D58.0) Hereditary spherocytosis (H)  Comment: runs low hgb   On folic acid   Plan: CBC with platelets and differential            (E55.9) Vitamin D deficiency  Comment:   Plan: Vitamin D Deficiency            (Z72.0) Tobacco abuse  Comment: declined any intervention   Plan: CT chest     (F41.9) Anxiety  Comment: want to see mental health   Plan: Comprehensive metabolic panel (BMP + Alb, Alk         Phos, ALT, AST, Total. Bili, TP), TSH with free        T4 reflex, CBC with platelets and differential            (R53.83) Other fatigue  Comment:   Plan: TSH with free T4 reflex, CBC with platelets and        differential, Vitamin D Deficiency, Adult         Mental Health  Referral            (H91.92) Deafness, left  Comment: was told there may be options    Plan: Adult ENT  Referral            (Z12.4) Cervical cancer screening  Comment: LEEP age 18 - normal since   Plan: Pap Screen with HPV - recommended age 30 - 65         years            (Z11.4) Screening for HIV (human  "immunodeficiency virus)  Comment:   Plan: HIV Antigen Antibody Combo            (Z11.59) Need for hepatitis C screening test  Comment:   Plan: Hepatitis C Screen Reflex to HCV RNA Quant and         Genotype            (L98.9) Skin lesion  Comment: upper thigh - probably need to see surgery for but wants to have a skin check   Plan: Adult Dermatology Referral            (K21.00) Gastroesophageal reflux disease with esophagitis without hemorrhage  Comment: restart medication   EGD ok   Plan: omeprazole (PRILOSEC) 20 MG DR capsule            (F33.0) Mild episode of recurrent major depressive disorder (H)  Comment: genetic testing   Plan: Adult Mental Health  Referral            (D17.30) Lipoma of skin and subcutaneous tissue  Comment:   Plan: Adult General Surg Referral                  COUNSELING:  Reviewed preventive health counseling, as reflected in patient instructions       Regular exercise       Healthy diet/nutrition       Immunizations    Declined: TDAP and covid booster              Osteoporosis prevention/bone health       Colorectal Cancer Screening       Consider Hep C screening for all patients one time for ages 18-79 years       HIV screeninx in teen years, 1x in adult years, and at intervals if high risk       Consider lung cancer screening for ages 55-80 years (77 for Medicare) and 20 pack-year smoking history     Estimated body mass index is 25.34 kg/m  as calculated from the following:    Height as of this encounter: 1.676 m (5' 6\").    Weight as of this encounter: 71.2 kg (157 lb).        She reports that she has been smoking cigarettes. She has a 12.00 pack-year smoking history. She has never used smokeless tobacco.  Tobacco Cessation Action Plan:   Information offered: Patient not interested at this time      Counseling Resources:  ATP IV Guidelines  Pooled Cohorts Equation Calculator  Breast Cancer Risk Calculator  BRCA-Related Cancer Risk Assessment: FHS-7 Tool  FRAX Risk " Assessment  ICSI Preventive Guidelines  Dietary Guidelines for Americans, 2010  [employer]'s MyPlate  ASA Prophylaxis  Lung CA Screening    BILLIE Monson CNP  [facility]  Answers for HPI/ROS submitted by the patient on 5/27/2022  If you checked off any problems, how difficult have these problems made it for you to do your work, take care of things at home, or get along with other people?: Very difficult  PHQ9 TOTAL SCORE: 10  AUDREY 7 TOTAL SCORE: 9      Lung Cancer Screening Shared Decision Making Visit     Kari Christian, a 56 year old female, is eligible for lung cancer screening    History   Smoking Status     Current Every Day Smoker     Packs/day: 0.30     Years: 40.00     Types: Cigarettes   Smokeless Tobacco     Never Used     Comment: current smoker  40 years at 2022       I have discussed with patient the risks and benefits of screening for lung cancer with low-dose CT.     The risks include:    radiation exposure: one low dose chest CT has as much ionizing radiation as about 15 chest x-rays, or 6 months of background radiation living in [place]      false positives: most findings/nodules are NOT cancer, but some might still require additional diagnostic evaluation, including biopsy    over-diagnosis: some slow growing cancers that might never have been clinically significant will be detected and treated unnecessarily     The benefit of early detection of lung cancer is contingent upon adherence to annual screening or more frequent follow up if indicated.     Furthermore, to benefit from screening, Kari must be willing and able to undergo diagnostic procedures, if indicated. Although no specific guide is available for determining severity of comorbidities, it is reasonable to withhold screening in patients who have greater mortality risk from other diseases.     We did discuss that the best way to prevent lung cancer is to not smoke.    Some patients may value a numeric  estimation of lung cancer risk when evaluating if lung cancer screening is right for them, here is one calculator:    ShouldIScreen

## 2022-05-27 NOTE — PATIENT INSTRUCTIONS
Lab in [address]    Pulmonology referral     CT scan - they will call you to set up     Omeprazole once daily     Symbicort twice daily for lung   Rinse mouth after using     Albuterol as needed     ENT referral     To schedule your mammogram, you may call the breast center at [phone].     Surgery referral     Mental health referral     Dermatology referral         Lung Cancer Screening   Frequently Asked Questions  If you are at high-risk for lung cancer, getting screened with low-dose computed tomography (LDCT) every year can help save your life. This handout offers answers to some of the most common questions about lung cancer screening. If you have other questions, please call [phone] ([phone]).     What is it?  Lung cancer screening uses special X-ray technology to create an image of your lung tissue. The exam is quick and easy and takes less than 10 seconds. We don t give you any medicine or use any needles. You can eat before and after the exam. You don t need to change your clothes as long as the clothing on your chest doesn t contain metal. But, you do need to be able to hold your breath for at least 6 seconds during the exam.    What is the goal of lung cancer screening?  The goal of lung cancer screening is to save lives. Many times, lung cancer is not found until a person starts having physical symptoms. Lung cancer screening can help detect lung cancer in the earliest stages when it may be easier to treat.    Who should be screened for lung cancer?  We suggest lung cancer screening for anyone who is at high-risk for lung cancer. You are in the high-risk group if you:     are between the ages of 55 and 79, and   have smoked at least 1 pack of cigarettes a day for 20 or more years, and   still smoke or have quit within the past 15 years.    However, if you have a new cough or shortness of breath, you should talk to your doctor before being screened.    Why does it matter if I have  symptoms?  Certain symptoms can be a sign that you have a condition in your lungs that should be checked and treated by your doctor. These symptoms include fever, chest pain, a new or changing cough, shortness of breath that you have never felt before, coughing up blood or unexplained weight loss. Having any of these symptoms can greatly affect the results of lung cancer screening.       Should all smokers get an LDCT lung cancer screening exam?  It depends. Lung cancer screening is for a very specific group of men and women who have a history of heavy smoking over a long period of time (see  Who should be screened for lung cancer  above).  I am in the high-risk group, but have been diagnosed with cancer in the past. Is LDCT lung cancer screening right for me?  In some cases, you should not have LDCT lung screening, such as when your doctor is already following your cancer with CT scan studies. Your doctor will help you decide if LDCT lung screening is right for you.  Do I need to have a screening exam every year?  Yes. If you are in the high-risk group described earlier, you should get an LDCT lung cancer screening exam every year until you are 79, or are no longer willing or able to undergo screening and possible procedures to diagnose and treat lung cancer.  How effective is LDCT at preventing death from lung cancer?  Studies have shown that LDCT lung cancer screening can lower the risk of death from lung cancer by 20 percent in people who are at high-risk.  What are the risks?  There are some risks and limitations of LDCT lung cancer screening. We want to make sure you understand the risks and benefits, so please let us know if you have any questions. Your doctor may want to talk with you more about these risks.   Radiation exposure: As with any exam that uses radiation, there is a very small increased risk of cancer. The amount of radiation in LDCT is small--about the same amount a person would get from a  mammogram. Your doctor orders the exam when he or she feels the potential benefits outweigh the risks.   False negatives: No test is perfect, including LDCT. It is possible that you may have a medical condition, including lung cancer, that is not found during your exam. This is called a false negative result.   False positives and more testing: LDCT very often finds something in the lung that could be cancer, but in fact is not. This is called a false positive result. False positive tests often cause anxiety. To make sure these findings are not cancer, you may need to have more tests. These tests will be done only if you give us permission. Sometimes patients need a treatment that can have side effects, such as a biopsy. For more information on false positives, see  What can I expect from the results?    Findings not related to lung cancer: Your LDCT exam also takes pictures of areas of your body next to your lungs. In a very small number of cases, the CT scan will show an abnormal finding in one of these areas, such as your kidneys, adrenal glands, liver or thyroid. This finding may not be serious, but you may need more tests. Your doctor can help you decide what other tests you may need, if any.  What can I expect from the results?  About 1 out of 4 LDCT exams will find something that may need more tests. Most of the time, these findings are lung nodules. Lung nodules are very small collections of tissue in the lung. These nodules are very common, and the vast majority--more than 97 percent--are not cancer (benign). Most are normal lymph nodes or small areas of scarring from past infections.  But, if a small lung nodule is found to be cancer, the cancer can be cured more than 90 percent of the time. To know if the nodule is cancer, we may need to get more images before your next yearly screening exam. If the nodule has suspicious features (for example, it is large, has an odd shape or grows over time), we will  refer you to a specialist for further testing.  Will my doctor also get the results?  Yes. Your doctor will get a copy of your results.  Is it okay to keep smoking now that there s a cancer screening exam?  No. Tobacco is one of the strongest cancer-causing agents. It causes not only lung cancer, but other cancers and cardiovascular (heart) diseases as well. The damage caused by smoking builds over time. This means that the longer you smoke, the higher your risk of disease. While it is never too late to quit, the sooner you quit, the better.  Where can I find help to quit smoking?  The best way to prevent lung cancer is to stop smoking. If you have already quit smoking, congratulations and keep it up! For help on quitting smoking, please call [employer] at 2-545-QUITNOW (1-767.195.3821) or the American [employer] Society at 1-539.344.8885 to find local resources near you.  One-on-one health coaching:  If you d prefer to work individually with a health care provider on tobacco cessation, we offer:     Medication Therapy Management:  Our specially trained pharmacists work closely with you and your doctor to help you quit smoking.  Call [phone] or [phone] (toll free).

## 2022-05-28 LAB
ALBUMIN SERPL-MCNC: 4.1 G/DL (ref 3.4–5)
ALP SERPL-CCNC: 88 U/L (ref 40–150)
ALT SERPL W P-5'-P-CCNC: 21 U/L (ref 0–50)
ANION GAP SERPL CALCULATED.3IONS-SCNC: 4 MMOL/L (ref 3–14)
AST SERPL W P-5'-P-CCNC: 15 U/L (ref 0–45)
BILIRUB SERPL-MCNC: 2.2 MG/DL (ref 0.2–1.3)
BUN SERPL-MCNC: 10 MG/DL (ref 7–30)
CALCIUM SERPL-MCNC: 8.7 MG/DL (ref 8.5–10.1)
CHLORIDE BLD-SCNC: 107 MMOL/L (ref 94–109)
CHOLEST SERPL-MCNC: 162 MG/DL
CO2 SERPL-SCNC: 28 MMOL/L (ref 20–32)
CREAT SERPL-MCNC: 0.66 MG/DL (ref 0.52–1.04)
FASTING STATUS PATIENT QL REPORTED: NO
GFR SERPL CREATININE-BSD FRML MDRD: >90 ML/MIN/1.73M2
GLUCOSE BLD-MCNC: 87 MG/DL (ref 70–99)
HDLC SERPL-MCNC: 62 MG/DL
LDLC SERPL CALC-MCNC: 78 MG/DL
NONHDLC SERPL-MCNC: 100 MG/DL
POTASSIUM BLD-SCNC: 3.7 MMOL/L (ref 3.4–5.3)
PROT SERPL-MCNC: 7.5 G/DL (ref 6.8–8.8)
SODIUM SERPL-SCNC: 139 MMOL/L (ref 133–144)
TRIGL SERPL-MCNC: 108 MG/DL
TSH SERPL DL<=0.005 MIU/L-ACNC: 0.42 MU/L (ref 0.4–4)

## 2022-05-30 LAB
DEPRECATED CALCIDIOL+CALCIFEROL SERPL-MC: 17 UG/L (ref 20–75)
HCV AB SERPL QL IA: NONREACTIVE
HIV 1+2 AB+HIV1 P24 AG SERPL QL IA: NONREACTIVE

## 2022-05-31 DIAGNOSIS — E55.9 VITAMIN D DEFICIENCY: Primary | ICD-10-CM

## 2022-05-31 RX ORDER — CHOLECALCIFEROL (VITAMIN D3) 50 MCG
2 TABLET ORAL DAILY
Qty: 200 TABLET | Refills: 3 | Status: SHIPPED | OUTPATIENT
Start: 2022-05-31

## 2022-06-01 LAB
BKR LAB AP GYN ADEQUACY: NORMAL
BKR LAB AP GYN INTERPRETATION: NORMAL
BKR LAB AP HPV REFLEX: NORMAL
BKR LAB AP PREVIOUS ABNORMAL: NORMAL
PATH REPORT.COMMENTS IMP SPEC: NORMAL
PATH REPORT.COMMENTS IMP SPEC: NORMAL
PATH REPORT.RELEVANT HX SPEC: NORMAL

## 2022-06-02 ENCOUNTER — PATIENT OUTREACH (OUTPATIENT)
Dept: ONCOLOGY | Facility: CLINIC | Age: 57
End: 2022-06-02
Payer: COMMERCIAL

## 2022-06-02 DIAGNOSIS — D58.0 HEREDITARY SPHEROCYTOSIS (H): Primary | ICD-10-CM

## 2022-06-02 DIAGNOSIS — R17 ELEVATED BILIRUBIN: ICD-10-CM

## 2022-06-02 NOTE — PROGRESS NOTES
Referral received for benign heme services, see below.    Referral reason: hereditary spherocytosis    Current abnormal labs: Available in chart review    Preferred location per patient or referral: [place]    Outreach: Call not placed to patient regarding referral.    Plan: Internal Referral: No additional work-up needed, scheduling instructions updated, referral transferred to [facility] for completion.

## 2022-06-06 LAB
HUMAN PAPILLOMA VIRUS 16 DNA: NEGATIVE
HUMAN PAPILLOMA VIRUS 18 DNA: NEGATIVE
HUMAN PAPILLOMA VIRUS FINAL DIAGNOSIS: NORMAL
HUMAN PAPILLOMA VIRUS OTHER HR: NEGATIVE

## 2022-06-10 ENCOUNTER — TELEPHONE (OUTPATIENT)
Dept: OTOLARYNGOLOGY | Facility: CLINIC | Age: 57
End: 2022-06-10
Payer: COMMERCIAL

## 2022-06-10 NOTE — TELEPHONE ENCOUNTER
[facility] Call Center    Phone Message    May a detailed message be left on voicemail: yes     Reason for Call: Appointment Intake    Referring Provider Name: Blessing Ling APRN CNP in [place] [facility]  Diagnosis and/or Symptoms: would like to discuss difference between baha and cochlear, as well as if they would even work for pt - she has tried steroid shots in left ear with no success    [facility] at [facility] [place] getting GERMÁN sent out so she can have records sent to us     Action Taken: Message routed to:  Other: [facility]    Travel Screening: Not Applicable

## 2022-06-14 NOTE — TELEPHONE ENCOUNTER
Spoke with pt and she is going to check with her insurance about price for the procedures she would like before setting up an appointment.    [facility]      Leyla Sanchez RN  [facility]  ENT  [address] [address]  [email or web address]   Office:[phone]  Employed by [employer]

## 2022-08-02 ENCOUNTER — TELEPHONE (OUTPATIENT)
Dept: INTERNAL MEDICINE | Facility: CLINIC | Age: 57
End: 2022-08-02

## 2022-08-02 NOTE — TELEPHONE ENCOUNTER
Patient calls to ask if she was to have a CT of Lungs? If so,  pls call to inform     Best number to call back [phone]

## 2022-08-02 NOTE — TELEPHONE ENCOUNTER
Attempted to contact patient. Left voice message to call back.     Please inform patient that Blessing did order CT Chest Lung Cancer Screen at 5/27/22 office visit.     Rosibel Hernandez RN  [facility]

## 2022-08-21 ENCOUNTER — HOSPITAL ENCOUNTER (OUTPATIENT)
Dept: CT IMAGING | Facility: CLINIC | Age: 57
Discharge: HOME | End: 2022-08-21
Attending: NURSE PRACTITIONER | Admitting: NURSE PRACTITIONER
Payer: COMMERCIAL

## 2022-08-21 DIAGNOSIS — Z87.891 PERSONAL HISTORY OF TOBACCO USE: ICD-10-CM

## 2022-08-21 DIAGNOSIS — Z71.6 ENCOUNTER FOR SMOKING CESSATION COUNSELING: ICD-10-CM

## 2022-08-21 PROCEDURE — 71271 CT THORAX LUNG CANCER SCR C-: CPT

## 2022-10-20 ENCOUNTER — HOSPITAL ENCOUNTER (OUTPATIENT)
Dept: MAMMOGRAPHY | Facility: CLINIC | Age: 57
Discharge: HOME | End: 2022-10-20
Attending: NURSE PRACTITIONER | Admitting: NURSE PRACTITIONER
Payer: COMMERCIAL

## 2022-10-20 DIAGNOSIS — Z00.00 ROUTINE GENERAL MEDICAL EXAMINATION AT A HEALTH CARE FACILITY: ICD-10-CM

## 2022-10-20 DIAGNOSIS — Z12.31 VISIT FOR SCREENING MAMMOGRAM: ICD-10-CM

## 2022-10-20 PROCEDURE — 77067 SCR MAMMO BI INCL CAD: CPT

## 2023-06-29 ENCOUNTER — OFFICE VISIT (OUTPATIENT)
Dept: INTERNAL MEDICINE | Facility: CLINIC | Age: 58
End: 2023-06-29
Payer: COMMERCIAL

## 2023-06-29 VITALS
TEMPERATURE: 97.8 F | HEIGHT: 66 IN | BODY MASS INDEX: 25.2 KG/M2 | HEART RATE: 103 BPM | OXYGEN SATURATION: 96 % | DIASTOLIC BLOOD PRESSURE: 62 MMHG | RESPIRATION RATE: 13 BRPM | SYSTOLIC BLOOD PRESSURE: 126 MMHG | WEIGHT: 156.8 LBS

## 2023-06-29 DIAGNOSIS — Z12.11 SCREEN FOR COLON CANCER: ICD-10-CM

## 2023-06-29 DIAGNOSIS — K58.0 IRRITABLE BOWEL SYNDROME WITH DIARRHEA: ICD-10-CM

## 2023-06-29 DIAGNOSIS — M25.50 MULTIPLE JOINT PAIN: ICD-10-CM

## 2023-06-29 DIAGNOSIS — R53.83 OTHER FATIGUE: Primary | ICD-10-CM

## 2023-06-29 DIAGNOSIS — F17.200 TOBACCO USE DISORDER: ICD-10-CM

## 2023-06-29 DIAGNOSIS — J45.30 MILD PERSISTENT ASTHMA, UNSPECIFIED WHETHER COMPLICATED: ICD-10-CM

## 2023-06-29 DIAGNOSIS — F32.0 MAJOR DEPRESSIVE DISORDER, SINGLE EPISODE, MILD (H): ICD-10-CM

## 2023-06-29 DIAGNOSIS — D58.0 HEREDITARY SPHEROCYTOSIS (H): ICD-10-CM

## 2023-06-29 DIAGNOSIS — Z00.00 ROUTINE HISTORY AND PHYSICAL EXAMINATION OF ADULT: ICD-10-CM

## 2023-06-29 LAB
ERYTHROCYTE [DISTWIDTH] IN BLOOD BY AUTOMATED COUNT: 19.3 % (ref 10–15)
HCT VFR BLD AUTO: 30.2 % (ref 35–47)
HGB BLD-MCNC: 10.7 G/DL (ref 11.7–15.7)
MCH RBC QN AUTO: 30.7 PG (ref 26.5–33)
MCHC RBC AUTO-ENTMCNC: 35.4 G/DL (ref 31.5–36.5)
MCV RBC AUTO: 87 FL (ref 78–100)
PLATELET # BLD AUTO: 235 10E3/UL (ref 150–450)
RBC # BLD AUTO: 3.49 10E6/UL (ref 3.8–5.2)
WBC # BLD AUTO: 7.8 10E3/UL (ref 4–11)

## 2023-06-29 PROCEDURE — 84443 ASSAY THYROID STIM HORMONE: CPT | Performed by: INTERNAL MEDICINE

## 2023-06-29 PROCEDURE — 36415 COLL VENOUS BLD VENIPUNCTURE: CPT | Performed by: INTERNAL MEDICINE

## 2023-06-29 PROCEDURE — 86038 ANTINUCLEAR ANTIBODIES: CPT | Performed by: INTERNAL MEDICINE

## 2023-06-29 PROCEDURE — 80053 COMPREHEN METABOLIC PANEL: CPT | Performed by: INTERNAL MEDICINE

## 2023-06-29 PROCEDURE — 80061 LIPID PANEL: CPT | Performed by: INTERNAL MEDICINE

## 2023-06-29 PROCEDURE — 85027 COMPLETE CBC AUTOMATED: CPT | Performed by: INTERNAL MEDICINE

## 2023-06-29 PROCEDURE — 84439 ASSAY OF FREE THYROXINE: CPT | Performed by: INTERNAL MEDICINE

## 2023-06-29 PROCEDURE — 99396 PREV VISIT EST AGE 40-64: CPT | Performed by: INTERNAL MEDICINE

## 2023-06-29 PROCEDURE — 99214 OFFICE O/P EST MOD 30 MIN: CPT | Mod: 25 | Performed by: INTERNAL MEDICINE

## 2023-06-29 ASSESSMENT — ENCOUNTER SYMPTOMS
HEMATOCHEZIA: 0
HEARTBURN: 1
ARTHRALGIAS: 1
SORE THROAT: 0
SHORTNESS OF BREATH: 1
EYE PAIN: 1
PALPITATIONS: 0
WEAKNESS: 1
ROS GI COMMENTS: HISTORY OF IBS
JOINT SWELLING: 1
HEADACHES: 1
DIARRHEA: 1
DIZZINESS: 1
MYALGIAS: 1
ABDOMINAL PAIN: 0
PARESTHESIAS: 1
NERVOUS/ANXIOUS: 0
COUGH: 1
FREQUENCY: 1
BREAST MASS: 0
FEVER: 0
HEMATURIA: 0

## 2023-06-29 ASSESSMENT — ANXIETY QUESTIONNAIRES
6. BECOMING EASILY ANNOYED OR IRRITABLE: SEVERAL DAYS
GAD7 TOTAL SCORE: 5
2. NOT BEING ABLE TO STOP OR CONTROL WORRYING: SEVERAL DAYS
5. BEING SO RESTLESS THAT IT IS HARD TO SIT STILL: NOT AT ALL
7. FEELING AFRAID AS IF SOMETHING AWFUL MIGHT HAPPEN: NOT AT ALL
GAD7 TOTAL SCORE: 5
1. FEELING NERVOUS, ANXIOUS, OR ON EDGE: SEVERAL DAYS
GAD7 TOTAL SCORE: 5
IF YOU CHECKED OFF ANY PROBLEMS ON THIS QUESTIONNAIRE, HOW DIFFICULT HAVE THESE PROBLEMS MADE IT FOR YOU TO DO YOUR WORK, TAKE CARE OF THINGS AT HOME, OR GET ALONG WITH OTHER PEOPLE: VERY DIFFICULT
3. WORRYING TOO MUCH ABOUT DIFFERENT THINGS: SEVERAL DAYS
7. FEELING AFRAID AS IF SOMETHING AWFUL MIGHT HAPPEN: NOT AT ALL
8. IF YOU CHECKED OFF ANY PROBLEMS, HOW DIFFICULT HAVE THESE MADE IT FOR YOU TO DO YOUR WORK, TAKE CARE OF THINGS AT HOME, OR GET ALONG WITH OTHER PEOPLE?: VERY DIFFICULT
4. TROUBLE RELAXING: SEVERAL DAYS

## 2023-06-29 ASSESSMENT — PATIENT HEALTH QUESTIONNAIRE - PHQ9
SUM OF ALL RESPONSES TO PHQ QUESTIONS 1-9: 13
10. IF YOU CHECKED OFF ANY PROBLEMS, HOW DIFFICULT HAVE THESE PROBLEMS MADE IT FOR YOU TO DO YOUR WORK, TAKE CARE OF THINGS AT HOME, OR GET ALONG WITH OTHER PEOPLE: EXTREMELY DIFFICULT
SUM OF ALL RESPONSES TO PHQ QUESTIONS 1-9: 13

## 2023-06-29 ASSESSMENT — ASTHMA QUESTIONNAIRES
QUESTION_4 LAST FOUR WEEKS HOW OFTEN HAVE YOU USED YOUR RESCUE INHALER OR NEBULIZER MEDICATION (SUCH AS ALBUTEROL): TWO OR THREE TIMES PER WEEK
QUESTION_2 LAST FOUR WEEKS HOW OFTEN HAVE YOU HAD SHORTNESS OF BREATH: ONCE A DAY
QUESTION_1 LAST FOUR WEEKS HOW MUCH OF THE TIME DID YOUR ASTHMA KEEP YOU FROM GETTING AS MUCH DONE AT WORK, SCHOOL OR AT HOME: MOST OF THE TIME
ACT_TOTALSCORE: 13
QUESTION_5 LAST FOUR WEEKS HOW WOULD YOU RATE YOUR ASTHMA CONTROL: SOMEWHAT CONTROLLED
QUESTION_3 LAST FOUR WEEKS HOW OFTEN DID YOUR ASTHMA SYMPTOMS (WHEEZING, COUGHING, SHORTNESS OF BREATH, CHEST TIGHTNESS OR PAIN) WAKE YOU UP AT NIGHT OR EARLIER THAN USUAL IN THE MORNING: ONCE A WEEK
ACT_TOTALSCORE: 13

## 2023-06-29 NOTE — PROGRESS NOTES
SUBJECTIVE:   CC: Kari is an 57 year old who presents for preventive health visit.  Patient had appointment scheduled with her PCP Blessing Cannon but was canceled due to provider not available    Healthy Habits:     Getting at least 3 servings of Calcium per day:  NO    Bi-annual eye exam:  Yes    Dental care twice a year:  Yes    Sleep apnea or symptoms of sleep apnea:  Daytime drowsiness    Diet:  Regular (no restrictions)    Frequency of exercise:  1 day/week    Duration of exercise:  15-30 minutes    Taking medications regularly:  Yes    Medication side effects:  Not applicable    Additional concerns today:  No      Today's PHQ-9 Score:       6/29/2023    12:44 PM   PHQ-9 SCORE   PHQ-9 Total Score MyChart 13 (Moderate depression)   PHQ-9 Total Score 13       Patient complains of multiple medical issues today    1.  Patient complains of multiple joint pains and would like to be tested for lupus as she has family history of lupus.    2.  Patient complains of fatigue, has history of hyperthyroidism in the past and was on medication but did not complete the course.  No history of sleep apnea, has history of hereditary [place] cytosis    3.  Patient states that she has history of asthma and was started on inhalers by Blessing Cannon but patient using Symbicort only as needed.  Her PCP Blessing Cannon had given referral to see pulmonologist but patient had not made appointment and requesting referral again.    4.  Patient also states that she has history of IBS with diarrhea but has not seen a gastroenterologist, has taken Imodium on and off.    5, patient also states that she has history of left hearing loss and has been evaluated by ENT in the past    6, history of depression ,had side effect with all medication she has tried , does not want to try medication , patient seeing therapist.    7.  Currently smokes half a pack a day for 40 years, had a CT chest-and found to have a 2mm pulmonary nodule and advised to repeat in  08/23       Past Medical History:   Diagnosis Date     Anemia, unspecified     normal is 7-10     Hereditary spherocytosis (H)     no past transfusion;      Major depressive disorder, single episode, mild (H)      Thyroid disease        Past Surgical History:   Procedure Laterality Date     CHOLECYSTECTOMY  2007     COLONOSCOPY  09/2014     COLONOSCOPY  01/15/2018    Dr. Wade [facility]     ESOPHAGOSCOPY, GASTROSCOPY, DUODENOSCOPY (EGD), COMBINED N/A 01/15/2018    Procedure: COMBINED ESOPHAGOSCOPY, GASTROSCOPY, DUODENOSCOPY (EGD), BIOPSY SINGLE OR MULTIPLE;  ESOPHAGOSCOPY, GASTROSCOPY, DUODENOSCOPY (EGD) with cold biopsies of duodenum and  COLONOSCOPY with polypectomy;  Surgeon: Chad Wade MD;  Location:  GI     HEAD & NECK SURGERY      wisdom teeth removed     LEEP TX, CERVICAL      Unknow date and results.       Current Outpatient Medications   Medication Sig Dispense Refill     albuterol (PROAIR HFA/PROVENTIL HFA/VENTOLIN HFA) 108 (90 Base) MCG/ACT inhaler Inhale 2 puffs into the lungs every 6 hours as needed for shortness of breath / dyspnea 18 g 11     budesonide-formoterol (SYMBICORT) 160-4.5 MCG/ACT Inhaler Inhale 2 puffs into the lungs 2 times daily 10.2 g 11     FOLIC ACID PO Take 1 mg by mouth as needed        vitamin D3 (CHOLECALCIFEROL) 50 mcg (2000 units) tablet Take 2 tablets (100 mcg) by mouth daily 200 tablet 3     omeprazole (PRILOSEC) 20 MG DR capsule Take 1 capsule (20 mg) by mouth daily (Patient not taking: Reported on 6/29/2023) 90 capsule 3       Family History   Problem Relation Age of Onset     Hypertension Mother      Arthritis Mother      Respiratory Mother         Emphysema     Gastrointestinal Disease Mother      Circulatory Mother      Neuropathy Mother      C.A.D. Father 59     Hypertension Father      Cancer Father         Esophageal     Gastrointestinal Disease Father         Gallstones, Gallbladder removal, Colostomy, diverticulitis     Cancer Maternal Grandfather          Kidney     Cancer Paternal Grandfather         Lung, smoker     Breast Cancer Sister      Thyroid Disease Sister      Cancer - colorectal No family hx of      Colon Cancer No family hx of        Social History     Tobacco Use     Smoking status: Every Day     Packs/day: 0.50     Years: 40.00     Pack years: 20.00     Types: Cigarettes     Smokeless tobacco: Never     Tobacco comments:     current smoker  40 years at 2022   Substance Use Topics     Alcohol use: Not Currently     Alcohol/week: 2.0 - 3.0 standard drinks of alcohol     Types: 2 - 3 Standard drinks or equivalent per week     Comment: socially             6/29/2023    12:48 PM   Alcohol Use   Prescreen: >3 drinks/day or >7 drinks/week? No     Reviewed orders with patient.  Reviewed health maintenance and updated orders accordingly - Yes  Lab work is in process    Breast Cancer Screening:      Pertinent mammograms are reviewed under the imaging tab.    History of abnormal Pap smear: NO - age 30- 65 PAP every 3 years recommended      Latest Ref Rng & Units 5/27/2022     1:57 PM   PAP / HPV   PAP  Negative for Intraepithelial Lesion or Malignancy (NILM)    HPV 16 DNA Negative Negative    HPV 18 DNA Negative Negative    Other HR HPV Negative Negative      Reviewed and updated as needed this visit by clinical staff                  Reviewed and updated as needed this visit by Provider                     Review of Systems   Constitutional: Negative for fever.   HENT: Positive for hearing loss. Negative for sore throat.         Has seen ENT   Eyes: Positive for visual disturbance.        Follows up with ophthalmologist   Respiratory: Positive for cough and shortness of breath.         History of asthma   Cardiovascular: Negative for palpitations.   Gastrointestinal: Positive for diarrhea. Negative for abdominal pain and hematochezia.        History of IBS   Breasts:  Negative for tenderness, breast mass and discharge.   Genitourinary: Positive for frequency and  "urgency. Negative for hematuria, vaginal bleeding and vaginal discharge.   Musculoskeletal: Positive for arthralgias, joint swelling and myalgias.   Skin: Negative for rash.   Psychiatric/Behavioral: Positive for mood changes. The patient is not nervous/anxious.         History of depression, seeing therapist       OBJECTIVE:   /62   Pulse 103   Temp 97.8  F (36.6  C) (Tympanic)   Resp 13   Ht 1.676 m (5' 6\")   Wt 71.1 kg (156 lb 12.8 oz)   LMP 04/28/2009   SpO2 96%   BMI 25.31 kg/m    Physical Exam  GENERAL APPEARANCE: healthy, alert and no distress  EYES: Eyes grossly normal to inspection, PERRL and conjunctivae and sclerae normal  HENT: ear canals and TM's normal, nose and mouth without ulcers or lesions, oropharynx clear and oral mucous membranes moist  RESP: lungs clear to auscultation - no rales, rhonchi or wheezes  BREAST: normal without masses, tenderness or nipple discharge and no palpable axillary masses or adenopathy  CV: regular rate and rhythm, normal S1 S2,   ABDOMEN: soft, nontender,   and bowel sounds normal  MS: no LE edema, no calf tenderness  NEURO: Normal strength and tone, sensory exam grossly normal, mentation intact and speech normal  PSYCH: mentation appears normal and affect normal/bright    ASSESSMENT/PLAN:         (Z00.00) Routine history and physical examination of adult  Plan: CBC with platelets, TSH with free T4 reflex,         Lipid panel reflex to direct LDL Fasting,         Comprehensive metabolic panel            (J45.30) Mild persistent asthma, unspecified whether complicated  Plan: Emphysematous changes in the last CT, patient has not been using her Symbicort regularly, advised to use Symbicort 2 puffs twice daily as directed and use albuterol inhaler as needed, patient was referred again to Adult Pulmonary Medicine Referral          (R53.83) Other fatigue    Plan: CBC with platelets, TSH with free T4 reflex,         Comprehensive metabolic panel        (M25.50) " Multiple joint pain  Plan: Patient requesting to be tested for lupus, check screening anti Nuclear Love IgG by IFA with Reflex          (F32.0) Major depressive disorder, single episode, mild (H)  Plan: PHQ-9 score 13 today, patient states she has had side effects with all the medications that she has tried, patient was advised to have follow-up with psychiatric provider, given information for [facility] she can also check with her insurance regarding [facility].    (D58.0) Hereditary spherocytosis (H)  Plan: Monitor CBC    (Z12.11) Screen for colon cancer  Plan: Due for colonoscopy patient states that she received a letter regarding colonoscopy and she will call and make an appointment    (F17.200) Tobacco use disorder  Plan: Patient continues to smoke, recommended nicotine patches, not interested at this time, she would like laser treatment for smoking cessation.  Patient was advised to call her insurance regarding resources for smoking cessation plan    (K58.0) Irritable bowel syndrome with diarrhea  Plan: Adult GI  Referral - Consult Only            Patient has been advised of split billing requirements and indicates understanding: Yes      COUNSELING:  Reviewed preventive health counseling, as reflected in patient instructions       Regular exercise       Healthy diet/nutrition       Immunizations    Due for Shingrix and tetanus vaccines.     She reports that she has been smoking cigarettes. She has a 20.00 pack-year smoking history. She has never used smokeless tobacco.  Nicotine/Tobacco Cessation Plan:   Information offered: Patient not interested at this time          Reynaldo Quispe MD  [facility]  Answers for HPI/ROS submitted by the patient on 6/29/2023  If you checked off any problems, how difficult have these problems made it for you to do your work, take care of things at home, or get along with other people?: Extremely  difficult  PHQ9 TOTAL SCORE: 13  AUDREY 7 TOTAL SCORE: 5

## 2023-06-29 NOTE — NURSING NOTE
"/62   Pulse 103   Temp 97.8  F (36.6  C) (Tympanic)   Resp 13   Ht 1.676 m (5' 6\")   Wt 71.1 kg (156 lb 12.8 oz)   LMP 04/28/2009   SpO2 96%   BMI 25.31 kg/m      "

## 2023-06-30 LAB
ALBUMIN SERPL BCG-MCNC: 4.5 G/DL (ref 3.5–5.2)
ALP SERPL-CCNC: 76 U/L (ref 35–104)
ALT SERPL W P-5'-P-CCNC: 13 U/L (ref 0–50)
ANA SER QL IF: NEGATIVE
ANION GAP SERPL CALCULATED.3IONS-SCNC: 12 MMOL/L (ref 7–15)
AST SERPL W P-5'-P-CCNC: 22 U/L (ref 0–45)
BILIRUB SERPL-MCNC: 2 MG/DL
BUN SERPL-MCNC: 17.2 MG/DL (ref 8–23)
CALCIUM SERPL-MCNC: 9.1 MG/DL (ref 8.6–10)
CHLORIDE SERPL-SCNC: 102 MMOL/L (ref 98–107)
CHOLEST SERPL-MCNC: 158 MG/DL
CREAT SERPL-MCNC: 0.69 MG/DL (ref 0.51–0.95)
DEPRECATED HCO3 PLAS-SCNC: 24 MMOL/L (ref 22–29)
GFR SERPL CREATININE-BSD FRML MDRD: >90 ML/MIN/1.73M2
GLUCOSE SERPL-MCNC: 83 MG/DL (ref 70–99)
HDLC SERPL-MCNC: 57 MG/DL
LDLC SERPL CALC-MCNC: 76 MG/DL
NONHDLC SERPL-MCNC: 101 MG/DL
POTASSIUM SERPL-SCNC: 3.8 MMOL/L (ref 3.4–5.3)
PROT SERPL-MCNC: 7.3 G/DL (ref 6.4–8.3)
SODIUM SERPL-SCNC: 138 MMOL/L (ref 136–145)
T4 FREE SERPL-MCNC: 1.55 NG/DL (ref 0.9–1.7)
TRIGL SERPL-MCNC: 124 MG/DL
TSH SERPL DL<=0.005 MIU/L-ACNC: 0.02 UIU/ML (ref 0.3–4.2)

## 2023-08-11 ENCOUNTER — PATIENT OUTREACH (OUTPATIENT)
Dept: GASTROENTEROLOGY | Facility: CLINIC | Age: 58
End: 2023-08-11
Payer: COMMERCIAL

## 2023-08-11 DIAGNOSIS — Z12.11 SPECIAL SCREENING FOR MALIGNANT NEOPLASMS, COLON: Primary | ICD-10-CM

## 2023-08-11 NOTE — PROGRESS NOTES
"Ordering/Referring Provider: Blessing Ling    BMI: Estimated body mass index is 25.31 kg/m  as calculated from the following:    Height as of 6/29/23: 1.676 m (5' 6\").    Weight as of 6/29/23: 71.1 kg (156 lb 12.8 oz).     Sedation:  Based on patient's medical history patient is appropriate for   moderate sedation. If patient's BMI > 50 do not schedule in ASC.    Location:  Does patient have an LVAD?  No    Does patient have a history of moderate to severe sleep apnea?  No    Does patient have a history of asthma, COPD or any other lung disease?  Yes     Patient has a documented history of Asthma.     Review of chart, indicate respiratory disease  mild, persistent asthma .    Does patient use home oxygen?  No    Does patient have a history of cardiac disease?  No    Is patient awaiting a heart or lung transplant?   No    Has patient had a stroke or transient ischemic attack in the last 6 months?   No    Is the patient currently on dialysis?   No    Prep:  Previous prep (last colonoscopy):   Could not locate    Quality of previous prep:   Good    Is patient currently on dialysis, is ESRD, or CKD stage 4/5?   No (standard prep)    Does patient have a diagnosis of diabetes?  No    Does patient have a diagnosis of cystic fibrosis (CF)?  No    BMI > 40?  No    Final Referral Status:  meets the criteria for placement of colonoscopy screening  referral order.      Referral order placed with the following recommendations:  Sedation: Moderate Sedation  Location Type: ASC  Prep: MiraLAX (No Mag Citrate)  "

## 2023-09-05 ENCOUNTER — TELEPHONE (OUTPATIENT)
Dept: INTERNAL MEDICINE | Facility: CLINIC | Age: 58
End: 2023-09-05
Payer: COMMERCIAL

## 2023-09-05 DIAGNOSIS — F17.200 SMOKER: Primary | ICD-10-CM

## 2023-09-05 NOTE — TELEPHONE ENCOUNTER
Left voice message for patient informing her order placed for lung cancer screening and she should receive a call for this. Provided radiology scheduling phone number for patient to call if she does not hear from them after several days.     Rosibel Hernandez RN  [facility]

## 2023-09-05 NOTE — TELEPHONE ENCOUNTER
Patient calls to request orders for CT Lung Cancer Screening and Colonoscopy. She also asks if she would need Dermatology referral to have skin check done.     Informed patient that Colonoscopy was ordered a few weeks ago by Blessing. Patient given phone number to schedule appointment. Advised patient to check with insurance regarding Dermatology referral requirement, most plans allow patients to self refer, but she would need to stay within their network and they can provide a list of covered clinics. Patient will call to schedule colonoscopy and check with insurance for what Dermatology clinics are covered.     Message forwarded to provider to request request for CT Lung Cancer Screening. Ok to leave detailed voice message when calling back.     Rosibel Hernandez RN  [facility]

## 2023-09-20 ENCOUNTER — PATIENT OUTREACH (OUTPATIENT)
Dept: CARE COORDINATION | Facility: CLINIC | Age: 58
End: 2023-09-20
Payer: COMMERCIAL

## 2023-09-29 NOTE — TELEPHONE ENCOUNTER
REFERRAL INFORMATION:  Referring Provider: Reynaldo Quispe MD  Referring Clinic: [facility]  Reason for Visit/Diagnosis: Irritable bowel syndrome with diarrhea     FUTURE VISIT INFORMATION:  Appointment Date: 10/5/23  Appointment Time: 9:45 AM     NOTES STATUS DETAILS   OFFICE NOTE from Referring Provider [facility]:  6/29/23- OV with Reynaldo Quispe MD   OFFICE NOTE from Other Specialist N/A    HOSPITAL DISCHARGE SUMMARY/  ED VISITS N/A    OPERATIVE REPORT N/A    MEDICATION LIST Internal         ENDOSCOPY  Internal [place]:  1/15/18- Upper GI Endoscopy   COLONOSCOPY Internal [place]:  1/15/18- Colonoscopy  9/30/14- Colonoscopy   ERCP N/A    EUS N/A    STOOL TESTING N/A    PERTINENT LABS Internal    PATHOLOGY REPORTS (RELATED) Internal [place]:  1/15/18- duodenum and sigmoid colon polyp; Case:     IMAGING (CT, MRI, EGD, MRCP, Small Bowel Follow Through/SBT, MR/CT Enterography) N/A

## 2023-10-05 ENCOUNTER — HOSPITAL ENCOUNTER (OUTPATIENT)
Dept: CT IMAGING | Facility: CLINIC | Age: 58
Discharge: HOME | End: 2023-10-05
Attending: NURSE PRACTITIONER | Admitting: NURSE PRACTITIONER
Payer: COMMERCIAL

## 2023-10-05 ENCOUNTER — PRE VISIT (OUTPATIENT)
Dept: GASTROENTEROLOGY | Facility: CLINIC | Age: 58
End: 2023-10-05

## 2023-10-05 DIAGNOSIS — F17.200 SMOKER: ICD-10-CM

## 2023-10-05 PROCEDURE — 71271 CT THORAX LUNG CANCER SCR C-: CPT

## 2023-10-06 DIAGNOSIS — Z12.2 SCREENING FOR LUNG CANCER: Primary | ICD-10-CM

## 2023-10-06 DIAGNOSIS — M99.9 NONALLOPATHIC LESION OF THORACIC REGION: ICD-10-CM

## 2023-10-06 DIAGNOSIS — F40.240 CLAUSTROPHOBIA: ICD-10-CM

## 2023-10-06 RX ORDER — LORAZEPAM 1 MG/1
1 TABLET ORAL
Qty: 2 TABLET | Refills: 0 | Status: SHIPPED | OUTPATIENT
Start: 2023-10-06 | End: 2024-01-04

## 2023-10-18 ENCOUNTER — PATIENT OUTREACH (OUTPATIENT)
Dept: CARE COORDINATION | Facility: CLINIC | Age: 58
End: 2023-10-18
Payer: COMMERCIAL

## 2023-10-19 ENCOUNTER — TELEPHONE (OUTPATIENT)
Dept: NEUROSURGERY | Facility: CLINIC | Age: 58
End: 2023-10-19
Payer: COMMERCIAL

## 2023-10-21 ENCOUNTER — HOSPITAL ENCOUNTER (OUTPATIENT)
Dept: MRI IMAGING | Facility: CLINIC | Age: 58
Discharge: HOME | End: 2023-10-21
Attending: NURSE PRACTITIONER | Admitting: NURSE PRACTITIONER
Payer: COMMERCIAL

## 2023-10-21 DIAGNOSIS — M99.9 NONALLOPATHIC LESION OF THORACIC REGION: ICD-10-CM

## 2023-10-21 PROCEDURE — 72157 MRI CHEST SPINE W/O & W/DYE: CPT

## 2023-10-21 PROCEDURE — 255N000002 HC RX 255 OP 636: Performed by: NURSE PRACTITIONER

## 2023-10-21 PROCEDURE — A9585 GADOBUTROL INJECTION: HCPCS | Performed by: NURSE PRACTITIONER

## 2023-10-21 RX ORDER — GADOBUTROL 604.72 MG/ML
7 INJECTION INTRAVENOUS ONCE
Status: COMPLETED | OUTPATIENT
Start: 2023-10-21 | End: 2023-10-21

## 2023-10-21 RX ADMIN — GADOBUTROL 7 ML: 604.72 INJECTION INTRAVENOUS at 14:41

## 2023-10-22 LAB — RADIOLOGIST FLAGS: NORMAL

## 2023-10-23 ENCOUNTER — TELEPHONE (OUTPATIENT)
Dept: INTERNAL MEDICINE | Facility: CLINIC | Age: 58
End: 2023-10-23
Payer: COMMERCIAL

## 2023-10-23 ENCOUNTER — TELEPHONE (OUTPATIENT)
Dept: NEUROSURGERY | Facility: CLINIC | Age: 58
End: 2023-10-23
Payer: COMMERCIAL

## 2023-10-23 DIAGNOSIS — K76.89 LIVER NODULE: Primary | ICD-10-CM

## 2023-10-23 NOTE — TELEPHONE ENCOUNTER
Patient had MR thoracic spine 10/21/23 and radiologist wanted provider made aware of incidental finding:    Impression #2 shows nonspecific right hepatic lobe mass. Dedicated MRI of the abdomen is recommended for further characterization. LEONA Daniels R.N.

## 2023-10-23 NOTE — TELEPHONE ENCOUNTER
Called patient from wait list for Neurosurgical clinic in [place].  Offered to reschedule to Oct 26 at 10a.

## 2023-10-24 NOTE — TELEPHONE ENCOUNTER
Called patient and informed her about the incidental finding on the thoracic MRI and MRI of the liver was recommended for follow-up. Informed patient that Dr. Vinson placed the order for MRI for the liver. Patient requested to talk to Blessing Ling first to see if there's a need for her to do this procedure.

## 2023-10-25 NOTE — TELEPHONE ENCOUNTER
Patient calls back asking if Blessingdrew Ling could give her a call to discuss recent MRI findings and plan going forward.  She states she was told there was something seen on liver but was never told what the results of the spine MRI were which was the original area of interest.     She has concerns about having another MRI so soon and worries if insurance will pay for another MRI.  She wants to know if she is supposed to just put the spine issue on the back burner and concentrate on liver.  Lots of questions.  LEONA Daniels R.N.

## 2023-10-26 ENCOUNTER — TELEPHONE (OUTPATIENT)
Dept: NEUROSURGERY | Facility: CLINIC | Age: 58
End: 2023-10-26
Payer: COMMERCIAL

## 2023-10-26 NOTE — TELEPHONE ENCOUNTER
Called patient off the neurosurgical [facility] wait list. Offered to reschedule patient to Oct 30 at either 9:30, 10:30 or 11:30.     Left clinic number to call back.

## 2023-10-30 ENCOUNTER — TELEPHONE (OUTPATIENT)
Dept: NEUROSURGERY | Facility: CLINIC | Age: 58
End: 2023-10-30
Payer: COMMERCIAL

## 2023-11-19 ENCOUNTER — HOSPITAL ENCOUNTER (OUTPATIENT)
Dept: MRI IMAGING | Facility: CLINIC | Age: 58
Discharge: HOME | End: 2023-11-19
Attending: INTERNAL MEDICINE | Admitting: INTERNAL MEDICINE
Payer: COMMERCIAL

## 2023-11-19 DIAGNOSIS — K76.89 LIVER NODULE: ICD-10-CM

## 2023-11-19 PROCEDURE — A9585 GADOBUTROL INJECTION: HCPCS | Performed by: INTERNAL MEDICINE

## 2023-11-19 PROCEDURE — 255N000002 HC RX 255 OP 636: Performed by: INTERNAL MEDICINE

## 2023-11-19 PROCEDURE — 74183 MRI ABD W/O CNTR FLWD CNTR: CPT

## 2023-11-19 RX ORDER — GADOBUTROL 604.72 MG/ML
7 INJECTION INTRAVENOUS ONCE
Status: COMPLETED | OUTPATIENT
Start: 2023-11-19 | End: 2023-11-19

## 2023-11-19 RX ADMIN — GADOBUTROL 7 ML: 604.72 INJECTION INTRAVENOUS at 14:12

## 2023-11-22 NOTE — TELEPHONE ENCOUNTER
SPINE PATIENTS - NEW PROTOCOL PREVISIT    RECORDS RECEIVED FROM: Referred by Blessing Ling APRN CNP in [facility]   REASON FOR VISIT: Nonallopathic lesion of thoracic region    Date of Appt: 11/28/2023   NOTES (FOR ALL VISITS) STATUS DETAILS   OFFICE NOTE from referring provider Internal Referral and notes in chart   OFFICE NOTE from other specialist N/A    DISCHARGE SUMMARY from hospital N/A    DISCHARGE REPORT from ER N/A    OPERATIVE REPORT N/A    EMG REPORT N/A    MEDICATION LIST N/A    IMAGING  (FOR ALL VISITS)     MRI (HEAD, NECK, SPINE) Internal MRI Thoracic Spine 10/21/23 - [facility]   XRAY (SPINE) *NEUROSURGERY* N/A    CT (HEAD, NECK, SPINE) N/A

## 2023-11-27 ENCOUNTER — TELEPHONE (OUTPATIENT)
Dept: NEUROSURGERY | Facility: CLINIC | Age: 58
End: 2023-11-27
Payer: COMMERCIAL

## 2023-11-28 ENCOUNTER — OFFICE VISIT (OUTPATIENT)
Dept: NEUROSURGERY | Facility: CLINIC | Age: 58
End: 2023-11-28
Attending: NURSE PRACTITIONER
Payer: COMMERCIAL

## 2023-11-28 ENCOUNTER — HOSPITAL ENCOUNTER (OUTPATIENT)
Dept: MAMMOGRAPHY | Facility: CLINIC | Age: 58
Discharge: HOME | End: 2023-11-28
Attending: NURSE PRACTITIONER
Payer: COMMERCIAL

## 2023-11-28 ENCOUNTER — PRE VISIT (OUTPATIENT)
Dept: NEUROSURGERY | Facility: CLINIC | Age: 58
End: 2023-11-28
Payer: COMMERCIAL

## 2023-11-28 VITALS — HEART RATE: 100 BPM | DIASTOLIC BLOOD PRESSURE: 68 MMHG | OXYGEN SATURATION: 97 % | SYSTOLIC BLOOD PRESSURE: 107 MMHG

## 2023-11-28 DIAGNOSIS — M99.9 NONALLOPATHIC LESION OF THORACIC REGION: ICD-10-CM

## 2023-11-28 DIAGNOSIS — Z12.31 VISIT FOR SCREENING MAMMOGRAM: ICD-10-CM

## 2023-11-28 PROCEDURE — 99214 OFFICE O/P EST MOD 30 MIN: CPT | Performed by: PHYSICIAN ASSISTANT

## 2023-11-28 PROCEDURE — 77067 SCR MAMMO BI INCL CAD: CPT

## 2023-11-28 PROCEDURE — 99203 OFFICE O/P NEW LOW 30 MIN: CPT | Performed by: PHYSICIAN ASSISTANT

## 2023-11-28 ASSESSMENT — PAIN SCALES - GENERAL: PAINLEVEL: MILD PAIN (2)

## 2023-11-28 NOTE — NURSING NOTE
"Kari Christian is a 57 year old female who presents for:  Chief Complaint   Patient presents with    Consult        Vitals:    Vitals:    11/28/23 1437   BP: 107/68   Pulse: 100   SpO2: 97%       BMI:  Estimated body mass index is 25.31 kg/m  as calculated from the following:    Height as of 6/29/23: 5' 6\" (1.676 m).    Weight as of 6/29/23: 156 lb 12.8 oz (71.1 kg).    Pain Score:  Mild Pain (2)        Amendo Phorn      "

## 2023-11-28 NOTE — PROGRESS NOTES
"NEUROSURGERY CLINIC CONSULT NOTE     DATE OF VISIT: 11/28/2023     SUBJECTIVE:     Kari Christian is a pleasant 57 year old female who presents to the clinic today for consultation on intermittent thoracolumbar spine pain with paresthesia. She is referred to the Neurosurgery Clinic by Dr. Ling in Primary Care.     Today, she reports a five-year history of symptoms. She describes a daily with fluctuating intensity sensation of paresthesia. These paresthesia affect bother upper and lower extremities in no specific distribution. Sometimes more than one body part is affected and sometimes only one. These symptoms are becoming more frequent and severe. She feels that her entire body feels \"exhausted\". When she stands for an extended time her feet start to throb. This is  accompanied by slight left lower and upper extremity weakness, this is minimal. Prolonged walking, standing and walking aggravate the symptoms, while alleviation is obtained by stretching and lying down. No mechanism of injury such as trauma or a fall is associated with the onset of the pain. There are no bowel or bladder changes. She denies saddle anesthesia. She denies changes in gait, instability, or falling episodes. There has been no significant change in her handwriting or hand dexterity.     She has not participated in conservative therapies.        Current Outpatient Medications:     albuterol (PROAIR HFA/PROVENTIL HFA/VENTOLIN HFA) 108 (90 Base) MCG/ACT inhaler, Inhale 2 puffs into the lungs every 6 hours as needed for shortness of breath / dyspnea, Disp: 18 g, Rfl: 11    budesonide-formoterol (SYMBICORT) 160-4.5 MCG/ACT Inhaler, Inhale 2 puffs into the lungs 2 times daily, Disp: 10.2 g, Rfl: 11    FOLIC ACID PO, Take 1 mg by mouth as needed , Disp: , Rfl:     LORazepam (ATIVAN) 1 MG tablet, Take 1 tablet (1 mg) by mouth once as needed for anxiety May repeat x 1, Disp: 2 tablet, Rfl: 0    omeprazole (PRILOSEC) 20 MG DR capsule, Take 1 capsule " (20 mg) by mouth daily (Patient not taking: Reported on 6/29/2023), Disp: 90 capsule, Rfl: 3    vitamin D3 (CHOLECALCIFEROL) 50 mcg (2000 units) tablet, Take 2 tablets (100 mcg) by mouth daily, Disp: 200 tablet, Rfl: 3     Allergies   Allergen Reactions    Cephalexin Itching    Sulfa Antibiotics         Past Medical History:   Diagnosis Date    Anemia, unspecified     normal is 7-10    Hereditary spherocytosis (H)     no past transfusion;     Major depressive disorder, single episode, mild (H)     Thyroid disease         ROS: 10 point review of symptoms are negative other than the symptoms noted above in the HPI.     Family History has been reviewed with the patient, there are no pertinent findings to presenting concern.     Past Surgical History:   Procedure Laterality Date    CHOLECYSTECTOMY  2007    COLONOSCOPY  09/2014    COLONOSCOPY  01/15/2018    Dr. Wade [facility]    ESOPHAGOSCOPY, GASTROSCOPY, DUODENOSCOPY (EGD), COMBINED N/A 01/15/2018    Procedure: COMBINED ESOPHAGOSCOPY, GASTROSCOPY, DUODENOSCOPY (EGD), BIOPSY SINGLE OR MULTIPLE;  ESOPHAGOSCOPY, GASTROSCOPY, DUODENOSCOPY (EGD) with cold biopsies of duodenum and  COLONOSCOPY with polypectomy;  Surgeon: Chad Wade MD;  Location:  GI    HEAD & NECK SURGERY      wisdom teeth removed    LEEP TX, CERVICAL      Unknow date and results.        Social History     Tobacco Use    Smoking status: Every Day     Packs/day: 0.50     Years: 40.00     Additional pack years: 0.00     Total pack years: 20.00     Types: Cigarettes    Smokeless tobacco: Never    Tobacco comments:     current smoker  40 years at 2022   Substance Use Topics    Alcohol use: Not Currently     Alcohol/week: 2.0 - 3.0 standard drinks of alcohol     Types: 2 - 3 Standard drinks or equivalent per week     Comment: socially    Drug use: No        OBJECTIVE:   /68   Pulse 100   LMP 04/28/2009   SpO2 97%    There is no height or weight on file to calculate BMI.     Imaging:       THORACIC SPINE W/O and W CONTRAST - 10/21/2023     FINDINGS:   Normal vertebral body heights, alignment and marrow signal. Mild thoracic disc degeneration. The discs are desiccated without disc height loss. Scattered small disc bulges. No spinal canal or foraminal compromise. No T2 cord signal abnormality or   abnormal cord parenchymal enhancement.      There are 2 soft tissue masses in the left paraspinal soft tissues in the prevertebral space. The more superior lesion centered at the T10 level measures 2.5 x 2 x 2.8 cm. The lesion centered at T11 measures 1.1 x 1.9 x 1.3 cm.     Partially imaged T2 hyperintense enhancing mass in the right hepatic lobe.                                                           IMPRESSION:  1.  Nonspecific paravertebral soft tissue masses in the left prevertebral space are similar in appearance to 2022. Findings could be seen with peripheral nerve sheath tumors, neurogenic neoplasm, non neurogenic tumor or less likely extra medullary   hematopoiesis.  2.  Nonspecific right hepatic lobe mass. Dedicated MRI of the abdomen is recommended for further characterization.    CT CHEST LUNG CANCER SCREEN LOW DOSE WITHOUT  10/5/2023 3:20 PM     1. ACR Assessment Category:  Lung-RADS Category 2. Benign appearance  or behavior. Recommendation: Continue annual screening, if clinically  relevant (please order exam code IMG 2290). .   2. Significant Incidental Finding(s):  Category S: Yes. Stable left  paravertebral masses at the T9-T10 and T10-T11 levels measuring 2.4 cm  and 2.0 cm. These are indeterminate, likely neurogenic neoplasm such  as neurofibromas. Recommend a thoracic spine MRI for better  evaluation.     Full radiological report in chart. Imaging was reviewed with with patient today.     Exam:     Patient appears comfortable, conversational, and in no apparent distress.   Head: Normocephalic, without obvious abnormality, atraumatic, no facial asymmetry.   Eyes: conjunctivae/corneas  clear. PERRL, EOM's intact.   Throat: lips, mucosa, and tongue normal; teeth and gums normal.   Neck: supple, symmetrical, trachea midline, no adenopathy and thyroid: not enlarged, symmetric, no tenderness/mass/nodules.   Lungs: clear to auscultation bilaterally.   Heart: regular rate and rhythm.   Abdomen: soft, non-tender; bowel sounds normal; no masses, no organomegaly.   Pulses: 2+ and symmetric.   Skin: Skin color, texture, turgor normal. No rashes or lesions.     CN II-XII grossly intact, alert and appropriate with conversation and following commands.   Gait is non-antalgic. Able to tandem walk. Able to walk on toes and heels without difficulty.   Cervical spine is non tender to palpation. Appropriate range of motion of neck, not concerning for lhermitte's phenomenon.   Bilateral bicep 2/4 and tricep reflexes 1/4. Sensation intact throughout upper extremities.     UE muscle strength  Right  Left    Deltoid  5/5  5/5    Biceps  5/5  5/5    Triceps  5/5  5/5    Hand intrinsics  5/5  5/5    Hand grasp  5/5  5/5    Mesa signs  neg  neg      Lumbar spine is non tender to palpation.  Intact sensation throughout lower extremities.   Bilateral patellar 2/4 and achilles reflex 1/4. Negative for pain with straight leg raise.     LE muscle strength  Right  Left    Iliopsoas (hip flexion)  5/5  5/5    Quad (knee extension)  5/5  5/5    Hamstring (knee flexion)  5/5  5/5    Gastrocnemius (PF)  5/5  5/5    Tibialis Ant. (DF)  5/5  5/5    EHL  5/5  5/5      Negative Babinski bilaterally. Negative for clonus.   Calves are soft and non-tender bilaterally.       ASSESSMENT/PLAN:     aKri Christian is a 57 year old female who presents to the clinic for consultation on five-year history a daily with fluctuating intensity sensation of paresthesia. These paresthesias affect both of her upper and lower extremities in no specific distribution. Sometimes more than one body part is affected and sometimes only one. These symptoms are  "becoming more frequent and severe. She feels that her entire body feels \"exhausted\". When she stands for an extended time her feet start to throb. This is  accompanied by slight left lower and upper extremity weakness, this is minimal. The patient's most recent imaging was reviewed with her today. It was explained that images show two soft tissue masses in the left paraspinal soft tissues in the prevertebral space. The more superior lesion centered at the T10 level measures 2.5 x 2 x 2.8 cm. The lesion centered at T11 measures 1.1 x 1.9 x 1.3 cm. On exam, she is noted to have slight left sided strength deficit, but  appropriate sensation, reflexes and range of motion. She has not attempted conservative management.     The paravertebral soft tissue masses in the left prevertebral space are noncontributory to her symptoms, however, she does have some possible vascular malformation in the subdural space that we do feel it would be in her best interest to obtain a spinal angiogram to further assess for any concerning pathology. Once the images have been obtained she has agreed to call our office back at [phone] to further discuss possible surgical interventions or other conservative therapies.           Respectfully,     ERICH Moore, VARGAS  [facility]  [facility]  Tel: [phone]      Exam, imaging, and plan reviewed by Dr. Pierre.   "

## 2023-11-28 NOTE — LETTER
"    11/28/2023         RE: Kari Christian  [address] [facility]  [place] [address]        Dear Colleague,    Thank you for referring your patient, Kari Christian, to the [facility] [place]. Please see a copy of my visit note below.    NEUROSURGERY CLINIC CONSULT NOTE     DATE OF VISIT: 11/28/2023     SUBJECTIVE:     Kari Christian is a pleasant 57 year old female who presents to the clinic today for consultation on intermittent thoracolumbar spine pain with paresthesia. She is referred to the Neurosurgery Clinic by Dr. Ling in Primary Care.     Today, she reports a five-year history of symptoms. She describes a daily with fluctuating intensity sensation of paresthesia. These paresthesia affect bother upper and lower extremities in no specific distribution. Sometimes more than one body part is affected and sometimes only one. These symptoms are becoming more frequent and severe. She feels that her entire body feels \"exhausted\". When she stands for an extended time her feet start to throb. This is  accompanied by slight left lower and upper extremity weakness, this is minimal. Prolonged walking, standing and walking aggravate the symptoms, while alleviation is obtained by stretching and lying down. No mechanism of injury such as trauma or a fall is associated with the onset of the pain. There are no bowel or bladder changes. She denies saddle anesthesia. She denies changes in gait, instability, or falling episodes. There has been no significant change in her handwriting or hand dexterity.     She has not participated in conservative therapies.        Current Outpatient Medications:      albuterol (PROAIR HFA/PROVENTIL HFA/VENTOLIN HFA) 108 (90 Base) MCG/ACT inhaler, Inhale 2 puffs into the lungs every 6 hours as needed for shortness of breath / dyspnea, Disp: 18 g, Rfl: 11     budesonide-formoterol (SYMBICORT) 160-4.5 MCG/ACT Inhaler, Inhale 2 puffs into the lungs 2 times daily, " Disp: 10.2 g, Rfl: 11     FOLIC ACID PO, Take 1 mg by mouth as needed , Disp: , Rfl:      LORazepam (ATIVAN) 1 MG tablet, Take 1 tablet (1 mg) by mouth once as needed for anxiety May repeat x 1, Disp: 2 tablet, Rfl: 0     omeprazole (PRILOSEC) 20 MG DR capsule, Take 1 capsule (20 mg) by mouth daily (Patient not taking: Reported on 6/29/2023), Disp: 90 capsule, Rfl: 3     vitamin D3 (CHOLECALCIFEROL) 50 mcg (2000 units) tablet, Take 2 tablets (100 mcg) by mouth daily, Disp: 200 tablet, Rfl: 3     Allergies   Allergen Reactions     Cephalexin Itching     Sulfa Antibiotics         Past Medical History:   Diagnosis Date     Anemia, unspecified     normal is 7-10     Hereditary spherocytosis (H)     no past transfusion;      Major depressive disorder, single episode, mild (H)      Thyroid disease         ROS: 10 point review of symptoms are negative other than the symptoms noted above in the HPI.     Family History has been reviewed with the patient, there are no pertinent findings to presenting concern.     Past Surgical History:   Procedure Laterality Date     CHOLECYSTECTOMY  2007     COLONOSCOPY  09/2014     COLONOSCOPY  01/15/2018    Dr. Wade [facility]     ESOPHAGOSCOPY, GASTROSCOPY, DUODENOSCOPY (EGD), COMBINED N/A 01/15/2018    Procedure: COMBINED ESOPHAGOSCOPY, GASTROSCOPY, DUODENOSCOPY (EGD), BIOPSY SINGLE OR MULTIPLE;  ESOPHAGOSCOPY, GASTROSCOPY, DUODENOSCOPY (EGD) with cold biopsies of duodenum and  COLONOSCOPY with polypectomy;  Surgeon: Chad Wade MD;  Location:  GI     HEAD & NECK SURGERY      wisdom teeth removed     LEEP TX, CERVICAL      Unknow date and results.        Social History     Tobacco Use     Smoking status: Every Day     Packs/day: 0.50     Years: 40.00     Additional pack years: 0.00     Total pack years: 20.00     Types: Cigarettes     Smokeless tobacco: Never     Tobacco comments:     current smoker  40 years at 2022   Substance Use Topics     Alcohol use: Not Currently      Alcohol/week: 2.0 - 3.0 standard drinks of alcohol     Types: 2 - 3 Standard drinks or equivalent per week     Comment: socially     Drug use: No        OBJECTIVE:   /68   Pulse 100   LMP 04/28/2009   SpO2 97%    There is no height or weight on file to calculate BMI.     Imaging:     MR THORACIC SPINE W/O and W CONTRAST - 10/21/2023     FINDINGS:   Normal vertebral body heights, alignment and marrow signal. Mild thoracic disc degeneration. The discs are desiccated without disc height loss. Scattered small disc bulges. No spinal canal or foraminal compromise. No T2 cord signal abnormality or   abnormal cord parenchymal enhancement.      There are 2 soft tissue masses in the left paraspinal soft tissues in the prevertebral space. The more superior lesion centered at the T10 level measures 2.5 x 2 x 2.8 cm. The lesion centered at T11 measures 1.1 x 1.9 x 1.3 cm.     Partially imaged T2 hyperintense enhancing mass in the right hepatic lobe.                                                           IMPRESSION:  1.  Nonspecific paravertebral soft tissue masses in the left prevertebral space are similar in appearance to 2022. Findings could be seen with peripheral nerve sheath tumors, neurogenic neoplasm, non neurogenic tumor or less likely extra medullary   hematopoiesis.  2.  Nonspecific right hepatic lobe mass. Dedicated MRI of the abdomen is recommended for further characterization.    CT CHEST LUNG CANCER SCREEN LOW DOSE WITHOUT  10/5/2023 3:20 PM     1. ACR Assessment Category:  Lung-RADS Category 2. Benign appearance  or behavior. Recommendation: Continue annual screening, if clinically  relevant (please order exam code IMG 2290). .   2. Significant Incidental Finding(s):  Category S: Yes. Stable left  paravertebral masses at the T9-T10 and T10-T11 levels measuring 2.4 cm  and 2.0 cm. These are indeterminate, likely neurogenic neoplasm such  as neurofibromas. Recommend a thoracic spine MRI for  better  evaluation.     Full radiological report in chart. Imaging was reviewed with with patient today.     Exam:     Patient appears comfortable, conversational, and in no apparent distress.   Head: Normocephalic, without obvious abnormality, atraumatic, no facial asymmetry.   Eyes: conjunctivae/corneas clear. PERRL, EOM's intact.   Throat: lips, mucosa, and tongue normal; teeth and gums normal.   Neck: supple, symmetrical, trachea midline, no adenopathy and thyroid: not enlarged, symmetric, no tenderness/mass/nodules.   Lungs: clear to auscultation bilaterally.   Heart: regular rate and rhythm.   Abdomen: soft, non-tender; bowel sounds normal; no masses, no organomegaly.   Pulses: 2+ and symmetric.   Skin: Skin color, texture, turgor normal. No rashes or lesions.     CN II-XII grossly intact, alert and appropriate with conversation and following commands.   Gait is non-antalgic. Able to tandem walk. Able to walk on toes and heels without difficulty.   Cervical spine is non tender to palpation. Appropriate range of motion of neck, not concerning for lhermitte's phenomenon.   Bilateral bicep 2/4 and tricep reflexes 1/4. Sensation intact throughout upper extremities.     UE muscle strength  Right  Left    Deltoid  5/5  5/5    Biceps  5/5  5/5    Triceps  5/5  5/5    Hand intrinsics  5/5  5/5    Hand grasp  5/5  5/5    Mesa signs  neg  neg      Lumbar spine is non tender to palpation.  Intact sensation throughout lower extremities.   Bilateral patellar 2/4 and achilles reflex 1/4. Negative for pain with straight leg raise.     LE muscle strength  Right  Left    Iliopsoas (hip flexion)  5/5  5/5    Quad (knee extension)  5/5  5/5    Hamstring (knee flexion)  5/5  5/5    Gastrocnemius (PF)  5/5  5/5    Tibialis Ant. (DF)  5/5  5/5    EHL  5/5  5/5      Negative Babinski bilaterally. Negative for clonus.   Calves are soft and non-tender bilaterally.       ASSESSMENT/PLAN:     Kari Christian is a 57 year old female who  "presents to the clinic for consultation on five-year history a daily with fluctuating intensity sensation of paresthesia. These paresthesias affect both of her upper and lower extremities in no specific distribution. Sometimes more than one body part is affected and sometimes only one. These symptoms are becoming more frequent and severe. She feels that her entire body feels \"exhausted\". When she stands for an extended time her feet start to throb. This is  accompanied by slight left lower and upper extremity weakness, this is minimal. The patient's most recent imaging was reviewed with her today. It was explained that images show two soft tissue masses in the left paraspinal soft tissues in the prevertebral space. The more superior lesion centered at the T10 level measures 2.5 x 2 x 2.8 cm. The lesion centered at T11 measures 1.1 x 1.9 x 1.3 cm. On exam, she is noted to have slight left sided strength deficit, but  appropriate sensation, reflexes and range of motion. She has not attempted conservative management.     The paravertebral soft tissue masses in the left prevertebral space are noncontributory to her symptoms, however, she does have some possible vascular malformation in the subdural space that we do feel it would be in her best interest to obtain a spinal angiogram to further assess for any concerning pathology. Once the images have been obtained she has agreed to call our office back at [phone] to further discuss possible surgical interventions or other conservative therapies.           Respectfully,     ERICH Moore PA-C  [facility] Neurosurgery  [facility]  Tel: [phone]      Exam, imaging, and plan reviewed by Dr. Pierre.       Again, thank you for allowing me to participate in the care of your patient.        Sincerely,        Nahum Gómez PA-C  "

## 2023-11-29 DIAGNOSIS — R20.2 PARESTHESIA: Primary | ICD-10-CM

## 2023-12-04 NOTE — TELEPHONE ENCOUNTER
RECORDS RECEIVED FROM: Internal    REASON FOR VISIT: Discuss spinal angiogram/ Nonallopathic lesion of thoracic region    Date of Appt: 12/12/23 @ 3:00 pm    NOTES (FOR ALL VISITS) STATUS DETAILS   OFFICE NOTE from referring provider Internal 10/6/23 (Orders Only) Blessing Ling APRN CNP @[facility]     OFFICE NOTE from other specialist Internal 11/28/23 Nahum Gómez PA-C @[facility] [place]     MEDICATION LIST Internal    IMAGING  (FOR ALL VISITS)     MRI (HEAD, NECK, SPINE) Internal [facility]  10/21/23 MR Thoracic Spine

## 2023-12-08 NOTE — TELEPHONE ENCOUNTER
RECORDS RECEIVED FROM: Internal    REASON FOR VISIT: Discuss spinal angiogram/ Nonallopathic lesion of thoracic region [M99.9]    Date of Appt: 1/10/24 @ 4:00 pm    NOTES (FOR ALL VISITS) STATUS DETAILS   OFFICE NOTE from referring provider Internal 10/6/23 (Orders Only) Blessing Ling APRN CNP @[facility]      OFFICE NOTE from other specialist Internal 11/28/23 Nahum Gómez PA-C @[facility] Neuro[facility] [place]      MEDICATION LIST Internal    IMAGING  (FOR ALL VISITS)     MRI (HEAD, NECK, SPINE) Internal [facility]  10/21/23 MR Thoracic Spine

## 2023-12-12 ENCOUNTER — PRE VISIT (OUTPATIENT)
Dept: NEUROSURGERY | Facility: CLINIC | Age: 58
End: 2023-12-12

## 2024-01-04 ENCOUNTER — VIRTUAL VISIT (OUTPATIENT)
Dept: INTERNAL MEDICINE | Facility: CLINIC | Age: 59
End: 2024-01-04
Payer: COMMERCIAL

## 2024-01-04 DIAGNOSIS — R53.83 OTHER FATIGUE: ICD-10-CM

## 2024-01-04 DIAGNOSIS — F33.1 MODERATE EPISODE OF RECURRENT MAJOR DEPRESSIVE DISORDER (H): Primary | ICD-10-CM

## 2024-01-04 DIAGNOSIS — J45.30 MILD PERSISTENT ASTHMA, UNSPECIFIED WHETHER COMPLICATED: ICD-10-CM

## 2024-01-04 DIAGNOSIS — D58.0 HEREDITARY SPHEROCYTOSIS (H): ICD-10-CM

## 2024-01-04 DIAGNOSIS — F41.9 ANXIETY: ICD-10-CM

## 2024-01-04 DIAGNOSIS — E55.9 VITAMIN D DEFICIENCY: ICD-10-CM

## 2024-01-04 DIAGNOSIS — N95.1 MENOPAUSAL SYNDROME (HOT FLASHES): ICD-10-CM

## 2024-01-04 PROCEDURE — 99215 OFFICE O/P EST HI 40 MIN: CPT | Mod: 95 | Performed by: NURSE PRACTITIONER

## 2024-01-04 PROCEDURE — 99417 PROLNG OP E/M EACH 15 MIN: CPT | Mod: 95 | Performed by: NURSE PRACTITIONER

## 2024-01-04 RX ORDER — ALBUTEROL SULFATE 90 UG/1
2 AEROSOL, METERED RESPIRATORY (INHALATION) EVERY 6 HOURS PRN
Qty: 18 G | Refills: 11 | Status: SHIPPED | OUTPATIENT
Start: 2024-01-04 | End: 2024-09-24

## 2024-01-04 RX ORDER — SERTRALINE HYDROCHLORIDE 25 MG/1
25 TABLET, FILM COATED ORAL DAILY
Qty: 30 TABLET | Refills: 1 | Status: SHIPPED | OUTPATIENT
Start: 2024-01-04 | End: 2024-02-07

## 2024-01-04 RX ORDER — BUDESONIDE AND FORMOTEROL FUMARATE DIHYDRATE 160; 4.5 UG/1; UG/1
2 AEROSOL RESPIRATORY (INHALATION) 2 TIMES DAILY
Qty: 10.2 G | Refills: 11 | Status: SHIPPED | OUTPATIENT
Start: 2024-01-04 | End: 2024-01-05

## 2024-01-04 ASSESSMENT — ANXIETY QUESTIONNAIRES
GAD7 TOTAL SCORE: 18
6. BECOMING EASILY ANNOYED OR IRRITABLE: NEARLY EVERY DAY
1. FEELING NERVOUS, ANXIOUS, OR ON EDGE: NEARLY EVERY DAY
5. BEING SO RESTLESS THAT IT IS HARD TO SIT STILL: SEVERAL DAYS
GAD7 TOTAL SCORE: 18
IF YOU CHECKED OFF ANY PROBLEMS ON THIS QUESTIONNAIRE, HOW DIFFICULT HAVE THESE PROBLEMS MADE IT FOR YOU TO DO YOUR WORK, TAKE CARE OF THINGS AT HOME, OR GET ALONG WITH OTHER PEOPLE: VERY DIFFICULT
7. FEELING AFRAID AS IF SOMETHING AWFUL MIGHT HAPPEN: NEARLY EVERY DAY
3. WORRYING TOO MUCH ABOUT DIFFERENT THINGS: NEARLY EVERY DAY
2. NOT BEING ABLE TO STOP OR CONTROL WORRYING: NEARLY EVERY DAY

## 2024-01-04 ASSESSMENT — PATIENT HEALTH QUESTIONNAIRE - PHQ9
5. POOR APPETITE OR OVEREATING: MORE THAN HALF THE DAYS
SUM OF ALL RESPONSES TO PHQ QUESTIONS 1-9: 20

## 2024-01-04 ASSESSMENT — ASTHMA QUESTIONNAIRES
QUESTION_5 LAST FOUR WEEKS HOW WOULD YOU RATE YOUR ASTHMA CONTROL: WELL CONTROLLED
QUESTION_3 LAST FOUR WEEKS HOW OFTEN DID YOUR ASTHMA SYMPTOMS (WHEEZING, COUGHING, SHORTNESS OF BREATH, CHEST TIGHTNESS OR PAIN) WAKE YOU UP AT NIGHT OR EARLIER THAN USUAL IN THE MORNING: ONCE A WEEK
QUESTION_4 LAST FOUR WEEKS HOW OFTEN HAVE YOU USED YOUR RESCUE INHALER OR NEBULIZER MEDICATION (SUCH AS ALBUTEROL): ONCE A WEEK OR LESS
ACT_TOTALSCORE: 17
QUESTION_1 LAST FOUR WEEKS HOW MUCH OF THE TIME DID YOUR ASTHMA KEEP YOU FROM GETTING AS MUCH DONE AT WORK, SCHOOL OR AT HOME: SOME OF THE TIME
ACT_TOTALSCORE: 17
QUESTION_2 LAST FOUR WEEKS HOW OFTEN HAVE YOU HAD SHORTNESS OF BREATH: THREE TO SIX TIMES A WEEK

## 2024-01-04 NOTE — NURSING NOTE
"Chief Complaint   Patient presents with    Fatigue     Pt c/o fatigue, depression, and anxiety along with other medical issues and is having a hard time at work. Has been written up for her performance. Pt expected to do more than before and has doing same job several years and she is struggling. What are her options?     initial LMP 04/28/2009  Estimated body mass index is 25.31 kg/m  as calculated from the following:    Height as of 6/29/23: 1.676 m (5' 6\").    Weight as of 6/29/23: 71.1 kg (156 lb 12.8 oz)..  bp completed using cuff size NA (Not Taken)  KATHY KRUSE LPN  "

## 2024-01-04 NOTE — PROGRESS NOTES
Kari is a 58 year old who is being evaluated via a billable video visit.      How would you like to obtain your AVS? Mail a copy  If the video visit is dropped, the invitation should be resent by: Text to cell phone: [phone]  Will anyone else be joining your video visit? No          Assessment & Plan     Moderate episode of recurrent major depressive disorder (H)  She is feeling very stressed at work.  She is unable to keep up with the work.  She has had 1 morning for not doing her job correctly and is sure that she will be getting another warning.  If she gets a third warning that is a fireable offense and she is worried that she will lose her job.  She would like to do short-term disability/FMLA time to try and get things under control.  She is a little against taking medication because she has tried some in the past although she does not know the names of all she has tried.  She thinks Paxil Wellbutrin and Lexapro have been tried in the past.  She is willing to try sertraline at a low dose and see how it goes.  I am putting a referral into mental health because she would like to do the genetic testing to see if there are good medicines for her, and if she is out because of her mental health she needs to be seeing a mental health provider ongoing.  She is going to check with her workplace to see what the processes for short-term disability and will get back to us.  I told her that she needs to be doing something to improve herself during this time, which would include counseling, medication, labs to make sure things are in good places and potentially seeing OB/GYN to discuss hormone therapy if she wants to do that.  She cannot just be off because she is stressed at her work without doing something to try and fix her mood so that she could return.  She understands this and is willing to do that    Anxiety  Her anxiety is high because she is worried about her workplace and her job.  As discussed above wants to  "do FMLA/short-term disability to get her mood in better place    Hereditary spherocytosis (H24)  Stable will check labs    Menopausal syndrome (hot flashes)  She needs to see OB/GYN to discuss hormone therapy if she wants to do this    Other fatigue  Related to her chronic issue.  Depression and anxiety are certainly impacting it.  She has not been treated for that and treatment may help her be able to function better.  She is willing to try    Vitamin D deficiency  Will recheck    Mild persistent asthma, unspecified whether complicated  Stable on current inhalers          55 minutes spent by me on the date of the encounter doing chart review, history and exam, documentation and further activities per the note       BMI:   Estimated body mass index is 25.31 kg/m  as calculated from the following:    Height as of 6/29/23: 1.676 m (5' 6\").    Weight as of 6/29/23: 71.1 kg (156 lb 12.8 oz).       Patient Instructions   Lab when you can     Refill on inhaler sent     Mental health referral     Ob gyn referral     Sertraline 25 mg daily     Follow up in a month          BILLIE Monson CNP  [facility] is a 58 year old, presenting for the following health issues:  Chief Complaint   Patient presents with    Fatigue     Pt c/o fatigue, depression, and anxiety along with other medical issues and is having a hard time at work. Has been written up for her performance. Pt expected to do more than before and has doing same job several years and she is struggling. What are her options?     IBS bad - cannot eat in am   If has to leave house has to stop at bathroom  Diarrhea often through the day - all are diarrhea form   Some days it is ok   Rarely has some harder stool     Does not participate in normal activity - like cleaning house etc     \"Hormone imbalance\"- not much symptoms when period stopped   Now 10 years  past she is having more hot flashes, no libido     Coping " with life harder     Job stress   Got a warning   She is probably going to get a second warning and if she gets a third, she will be fired   New company took over the company and have higher expectations than previous job   Feels like it is an unachievable expectation    Would like to do FMLA time for her mental health   [employer] is company     Low energy from   Spherocytosis -hereditary spherocytosis   Saw hematology   Told they would watch spleen and see how it is and if enlarged would remove it   Told to take folic acid   Family member had spleen out and are sicker ongoing            1/4/2024     1:53 PM   Additional Questions   Roomed by Audrey DALEY       HPI         5/27/2022    12:47 PM 6/29/2023    12:44 PM 1/4/2024     5:38 PM   PHQ   PHQ-9 Total Score 10 13 20   Q9: Thoughts of better off dead/self-harm past 2 weeks Not at all Not at all Not at all         5/27/2022    12:47 PM 6/29/2023    12:45 PM 1/4/2024     5:38 PM   AUDREY-7 SCORE   Total Score 9 (mild anxiety) 5 (mild anxiety)    Total Score 9 5 18       Medication taken  Wellbutrin - dizzy   Lexapro - dizzy   Ativan   Trazodone   Paxil dizzy     Review of Systems   Constitutional, HEENT, cardiovascular, pulmonary, GI, , musculoskeletal, neuro, skin, endocrine and psych systems are negative, except as otherwise noted.      Objective           Vitals:  No vitals were obtained today due to virtual visit.    Physical Exam   GENERAL: alert and no distress  EYES: Eyes grossly normal to inspection.  No discharge or erythema, or obvious scleral/conjunctival abnormalities.  RESP: No audible wheeze, cough, or visible cyanosis.  No visible retractions or increased work of breathing.    SKIN: Visible skin clear. No significant rash, abnormal pigmentation or lesions.  NEURO: Cranial nerves grossly intact.  Mentation and speech appropriate for age.  PSYCH: Mentation appears normal, affect normal/bright, judgement and insight intact, normal speech and  appearance well-groomed.    Lab             Video-Visit Details    Type of service:  Video Visit     Originating Location (pt. Location): Home    Distant Location (provider location):  On-site  Platform used for Video Visit: Marilia

## 2024-01-05 ENCOUNTER — TELEPHONE (OUTPATIENT)
Dept: INTERNAL MEDICINE | Facility: CLINIC | Age: 59
End: 2024-01-05
Payer: COMMERCIAL

## 2024-01-05 DIAGNOSIS — J45.30 MILD PERSISTENT ASTHMA, UNSPECIFIED WHETHER COMPLICATED: Primary | ICD-10-CM

## 2024-01-05 RX ORDER — BUDESONIDE AND FORMOTEROL FUMARATE DIHYDRATE 160; 4.5 UG/1; UG/1
2 AEROSOL RESPIRATORY (INHALATION) 2 TIMES DAILY
Qty: 10.2 G | Refills: 11 | Status: SHIPPED | OUTPATIENT
Start: 2024-01-05 | End: 2024-09-24

## 2024-01-05 RX ORDER — FLUTICASONE PROPIONATE AND SALMETEROL 250; 50 UG/1; UG/1
1 POWDER RESPIRATORY (INHALATION) EVERY 12 HOURS
Qty: 60 EACH | Refills: 11 | Status: SHIPPED | OUTPATIENT
Start: 2024-01-05 | End: 2024-01-05

## 2024-01-05 NOTE — TELEPHONE ENCOUNTER
Call to notify patient. Patient states that she's tried Advair in the past and doesn't think it was very effective. Patient states she would like to appeal to let them know that the Symbicort was working well for her.    Please advise on request to appeal.    Thank you,  Mane Hemphill, Triage RN [place]  1:09 PM 1/5/2024

## 2024-01-05 NOTE — TELEPHONE ENCOUNTER
Can we ask prior auth to see if this is enough  I resent the symbicort with tried and failed advair  in past on prescription

## 2024-01-05 NOTE — TELEPHONE ENCOUNTER
Central Prior Authorization Team   Phone: [phone]      PRIOR AUTHORIZATION DENIED    Medication: BUDESONIDE-FORMOTEROL FUMARATE 160-4.5 MCG/ACT IN AERO  Insurance Company: [employer] - Phone [phone] Fax [phone]  Denial Date: 1/5/2024  Denial Reason(s): MUST TRY/FAIL FLUTICASONE-SALMETEROL, WIXELA INHUB, BREO ELLIPTA      Appeal Information: IF PROVIDER WOULD LIKE TO APPEAL THIS DECISION PLEASE PROVIDE THE PA TEAM WITH A LETTER OF MEDICAL NECESSITY      Patient Notified: No

## 2024-01-05 NOTE — PATIENT INSTRUCTIONS
Lab when you can     Refill on inhaler sent     Mental health referral     Ob gyn referral     Sertraline 25 mg daily     Follow up in a month

## 2024-01-08 ENCOUNTER — TELEPHONE (OUTPATIENT)
Dept: BEHAVIORAL HEALTH | Facility: CLINIC | Age: 59
End: 2024-01-08
Payer: COMMERCIAL

## 2024-01-08 ENCOUNTER — TELEPHONE (OUTPATIENT)
Dept: INTERNAL MEDICINE | Facility: CLINIC | Age: 59
End: 2024-01-08
Payer: COMMERCIAL

## 2024-01-08 NOTE — TELEPHONE ENCOUNTER
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

## 2024-01-08 NOTE — TELEPHONE ENCOUNTER
The intent and discussion that we had was that she should start and see how she does on it and mental health can continue to add to her care as needed. If she is going to say she needs to be off of work for mental health reasons, we have to be trying to treat it for her work to ok her time off   And we should start something to help her symptoms

## 2024-01-08 NOTE — TELEPHONE ENCOUNTER
First attempt at reaching patient. Left message asking for a return call to schedule with the TC. Sent e-mail to e-mail address listed in referral. Will postpone for follow up tomorrow.    Nathalie Ritchie  Transition Clinic Coordinator  01/08/24 11:51 AM

## 2024-01-08 NOTE — TELEPHONE ENCOUNTER
----- Message from Jodi Anaya sent at 1/6/2024  9:59 AM CST -----  Regarding: NOTE 2 (two) referrals: LT Psychiatry Adult AND Adult Therapy  Transition Clinic Referral   [place]       Please Check Type of Referral Requested:       __x__THERAPY: The Transition clinic is able to schedule patients without current medical insurance; these patient will be referred to our Social Work Care Coordinator for Medical Insurance              Assistance. We are open for referral for psychotherapy. Patient is referred from:  Outpatient Intake      __x__MEDICATION:   Referrals for Medication are ONLY accepted from the following areas (select): Other..... STANDARD PRIORITY                                        Suboxone and Opioid Management Referrals are automatically denied.   TC Psychiatry cannot see patient without active medical insurance.   Next level of psychiatry care must be within 12 months.  TC Psychiatry cannot accept patient with next level of care scheduled with PCP.  The transition clinic cannot follow patients who are on a restricted recipient program.    ___ Long-Acting Injection (LUGO)?    Is referred patient receiving a long-acting injection (LUGO)?  If YES, provide the following:    Name and dose of Medication:   Date Injection was last administered to patient:   Next Long- Acting injection Due Date:     Is referred patient transferring from [facility]?  If YES, provide the following:     ___  LUGO will [facility] receiving LUGO [facility] [place]  ___  LUGO will be administered per an IRTS or elsewhere in the community       GUARDIAN: If your patient is not their own Guardian, please provide the following:    Guardian Name:  Guardian Contact Information (Phone & Email) :  Guardian Address:     FOSTER CARE PROVIDER: If your patient lives at a Licensed Foster Care, please provide the following:    Foster Provider:  Foster Provider Contact Information (Phone & Email):  Abhishek  Provider Address:     Referring Provider Contact Name: Blessing Ling; Phone Number: [phone]    Reason for Transition Clinic Referral: LT Psychiatry & Adult Therapy    Next Level of Care Patient Will Be Transitioned To: [facility]  Provider(s)Ciara Villareal & Usman Yoo  Location For [facility]  Date/Time APRIL 25th at 10 am (Ciara) & APRIL 11 2024 at 9am (Usman)    What Would Be Helpful from the Transition Clinic: bridge therapy and psychiatry     Needs: NO    Does Patient Have Access to Technology: yes    Patient E-mail Address: [email or web address]    Current Patient Phone Number: [phone];     Clinician Gender Preference (if applicable): YES: female    [facility]    Jodi Anaya      (Master Form: Updated 11/28/2023)

## 2024-01-08 NOTE — TELEPHONE ENCOUNTER
Patient calls today. She wants to know if she is to begin taking the zoloft before she sees Mental health clinic and begins the genetic testing to see what medications work for her or wait until after the genetic testing? Patient said she did ask the mental health clinic and they said it was up to her PCP.     Mental health clinic called patient saturday but patient did not make any appointment yet as she had not yet reached out to her insurance to see what is covered. Once she does that she said she will call to let us know.     Patient call back number [phone]

## 2024-01-08 NOTE — TELEPHONE ENCOUNTER
Central Prior Authorization Team   Phone: [phone]    PA Initiation    Medication: BUDESONIDE-FORMOTEROL FUMARATE 160-4.5 MCG/ACT IN AERO  Insurance Company: [employer] - Phone [phone] Fax [phone]  Pharmacy Filling the Rx: [id number] - [place], [place] - [address] AT [facility]  Filling Pharmacy Phone: [phone]  Filling Pharmacy Fax:    Start Date: 1/8/2024    CHIP QUIÑONES REQUEST WITH INFORMATION PROVIDED -

## 2024-01-09 ENCOUNTER — TELEPHONE (OUTPATIENT)
Dept: BEHAVIORAL HEALTH | Facility: CLINIC | Age: 59
End: 2024-01-09
Payer: COMMERCIAL

## 2024-01-09 NOTE — TELEPHONE ENCOUNTER
Coordinator explained TC services. Scheduled initial TC Therapy 1/18/2024.  Explained will email questionnaires to patient, encouraged to complete prior to the scheduled appointment.    Wants to do genetic testing to try and figure out what medication would work best. Believes different referral in for that, will wait for that for medication management. Doesn't want to schedule TC Med Mgmt at this time.    Nathalie Ritchie  Transition Clinic Coordinator  01/09/24 8:29 AM

## 2024-01-09 NOTE — TELEPHONE ENCOUNTER
Call to patient. Advised. Patient has therapy appointment scheduled. Number given to patient to schedule for medication management.

## 2024-01-09 NOTE — TELEPHONE ENCOUNTER
Call to patient. Advised. Patient states she is willing to try whatever Blessing thinks would be most similar to Symbicort.

## 2024-01-09 NOTE — TELEPHONE ENCOUNTER
----- Message from Jodi Anaya sent at 1/6/2024  9:59 AM CST -----  Regarding: NOTE 2 (two) referrals: LT Psychiatry Adult AND Adult Therapy  Transition Clinic Referral   [place]       Please Check Type of Referral Requested:       __x__THERAPY: The Transition clinic is able to schedule patients without current medical insurance; these patient will be referred to our Social Work Care Coordinator for Medical Insurance              Assistance. We are open for referral for psychotherapy. Patient is referred from:  Outpatient Intake      __x__MEDICATION:   Referrals for Medication are ONLY accepted from the following areas (select): Other..... STANDARD PRIORITY                                        Suboxone and Opioid Management Referrals are automatically denied.   TC Psychiatry cannot see patient without active medical insurance.   Next level of psychiatry care must be within 12 months.  TC Psychiatry cannot accept patient with next level of care scheduled with PCP.  The transition clinic cannot follow patients who are on a restricted recipient program.    ___ Long-Acting Injection (LUGO)?    Is referred patient receiving a long-acting injection (LUGO)?  If YES, provide the following:    Name and dose of Medication:   Date Injection was last administered to patient:   Next Long- Acting injection Due Date:     Is referred patient transferring from [facility]?  If YES, provide the following:     ___  LUGO will be receiving LUGO at [facility] in [place]  ___  LUGO will be administered per an IRTS or elsewhere in the community       GUARDIAN: If your patient is not their own Guardian, please provide the following:    Guardian Name:  Guardian Contact Information (Phone & Email) :  Guardian Address:     FOSTER CARE PROVIDER: If your patient lives at a Licensed Foster Care, please provide the following:    Foster Provider:  Foster Provider Contact Information (Phone & Email):  Abhishek  Provider Address:     Referring Provider Contact Name: Blessing Ling; Phone Number: [phone]    Reason for Transition Clinic Referral: LT Psychiatry & Adult Therapy    Next Level of Care Patient Will Be Transitioned To: Merged with [facility]  Provider(s)Ciara Villareal & Usman Yoo  Location For [facility]  Date/Time APRIL 25th at 10 am (Ciara) & APRIL 11 2024 at 9am (Usman)    What Would Be Helpful from the Transition Clinic: bridge therapy and psychiatry     Needs: NO    Does Patient Have Access to Technology: yes    Patient E-mail Address: [email or web address]    Current Patient Phone Number: [phone];     Clinician Gender Preference (if applicable): YES: female    [facility]    Jodi Anaya      (Master Form: Updated 11/28/2023)

## 2024-01-09 NOTE — TELEPHONE ENCOUNTER
PRIOR AUTHORIZATION DENIED    Medication: BUDESONIDE-FORMOTEROL FUMARATE 160-4.5 MCG/ACT IN AERO  Insurance Company: [employer] - Phone [phone] Fax [phone]  Denial Date: 1/8/2024  Denial Reason(s): MUST TRY/FAIL 3 PREFERRED ALTERNATIVES OR MEDICAL RATIONALE MUST BE PROVIDED OF WHY THEY CANNOT BE TRIED- FLUTICASONE-SALMETEROL (TRIED), WIXELA INHUB, BREO ELLIPTA      Appeal Information: IF PROVIDER WOULD LIKE TO APPEAL THIS DECISION PLEASE PROVIDE THE PA TEAM WITH A LETTER OF MEDICAL NECESSITY      Patient Notified: No

## 2024-01-10 ENCOUNTER — PRE VISIT (OUTPATIENT)
Dept: NEUROSURGERY | Facility: CLINIC | Age: 59
End: 2024-01-10

## 2024-01-18 ENCOUNTER — VIRTUAL VISIT (OUTPATIENT)
Dept: BEHAVIORAL HEALTH | Facility: CLINIC | Age: 59
End: 2024-01-18
Payer: COMMERCIAL

## 2024-01-18 DIAGNOSIS — F32.2 CURRENT SEVERE EPISODE OF MAJOR DEPRESSIVE DISORDER WITHOUT PSYCHOTIC FEATURES WITHOUT PRIOR EPISODE (H): ICD-10-CM

## 2024-01-18 DIAGNOSIS — F41.1 GENERALIZED ANXIETY DISORDER: Primary | ICD-10-CM

## 2024-01-19 NOTE — PROGRESS NOTES
Transition Clinic - Initial Visit Progress Note    Patient  Name: Kari Christian, : 1965    Date:  2024       Service Type:  Mental Health Initial Visit       Start Time: 1148  Stop Time: 1238     Session Length:   TC Session Length: 45 (38-52 Minutes)    Visit #: 1    Attendees:  TC Attendees: Client alone    Service Modality:  Service Modality: Video Visit:      Provider verified identity through the following two step process.  Patient provided:  Patient photo, Patient , and Patient address    Telemedicine Visit: The patient's condition can be safely assessed and treated via synchronous audio and visual telemedicine encounter.      Reason for Telemedicine Visit: Patient has requested telehealth visit    Originating Site (Patient Location): Patient's home    Distant Site (Provider Location): [facility]    Consent:  The patient/guardian has verbally consented to: the potential risks and benefits of telemedicine (video visit) versus in person care; bill my insurance or make self-payment for services provided; and responsibility for payment of non-covered services.     Patient would like the video invitation sent by:  My Chart    Mode of Communication:  Video Conference via [facility]    Distant Location (Provider):  On-site    As the provider I attest to compliance with applicable laws and regulations related to telemedicine.    Source of Referral:  Wyatt Ling;  #: [id number]     Informed Consent and Assessment Methods    This provider and patient discussed HIPAA, and the limits of confidentiality; including mandated reporting, the possibility of collaborative discussions with patient's primary care provider and the multidisciplinary team in the MH clinic during consultation.  Discussed the no show policy, and the benefits and possible unintended consequences of therapy.  Patient indicated understanding Transition Clinic services are  short term, solution focused, until a referral can be made and patient can bridge to long term therapy.  Patient verbally indicated understanding the informed consent.         Presenting Concerns/  Current Stressors:  Kari Christian is a 58 year old identified female who was referred to the Transition Clinic by Blessing Ling CNP  for short term solutions based intervention and bridging to long term therapy.   Kari Christian session started late after 2 attempts to connect. She reports recent changes at her place of employment including increased productivity expectations have lead to increased anxiety and depression. aKri exhibited sever anxiety and Depression pacing through the 50 minute session, wringing hands, fast pressures speech and perseveration on work with difficulty in redirecting.  Kari is scheduled to begin long term therapy with PABLO Garcia on 4/11/2024 and psychiatry with Ciara Villareal NP. On 4/25/2024.  Kari denied Suicidal and homicidal ideations.  She indicated she wanted to start long term therapy right away and not wait until April. Provider will also request referral to  psychiatry.      ASSESSMENT:    Required Screenings: The following assessments were completed by patient for this visit:  PHQ9:       7/23/2019     3:16 PM 7/23/2019     4:11 PM 3/19/2021     9:27 AM 3/19/2021    11:52 AM 5/27/2022    12:47 PM 6/29/2023    12:44 PM 1/4/2024     5:38 PM   PHQ-9 SCORE   PHQ-9 Total Score MyChart 15 (Moderately severe depression)  13 (Moderate depression)  10 (Moderate depression) 13 (Moderate depression)    PHQ-9 Total Score 15 9 13 15 10 13 20     GAD7:       6/29/2018     4:46 PM 3/22/2019    11:46 AM 7/23/2019     4:12 PM 3/19/2021    11:52 AM 5/27/2022    12:47 PM 6/29/2023    12:45 PM 1/4/2024     5:38 PM   AUDREY-7 SCORE   Total Score     9 (mild anxiety) 5 (mild anxiety)    Total Score 6 12 10 12 9 5 18     CAGE-AID:        No data to display              PROMIS 10-Global Health  (all questions and answers displayed):       1/19/2024     4:00 PM   PROMIS 10   In general, would you say your health is: 2   In general, would you say your quality of life is: 2   In general, how would you rate your physical health? 2   In general, how would you rate your mental health, including your mood and your ability to think? 2   In general, how would you rate your satisfaction with your social activities and relationships? 3   In general, please rate how well you carry out your usual social activities and roles. (This includes activities at home, at work and in your community, and responsibilities as a parent, child, spouse, employee, friend, etc.) 3   To what extent are you able to carry out your everyday physical activities such as walking, climbing stairs, carrying groceries, or moving a chair? 3   In the past 7 days, how often have you been bothered by emotional problems such as feeling anxious, depressed, or irritable? 5   In the past 7 days, how would you rate your fatigue on average? 4   In the past 7 days, how would you rate your pain on average, where 0 means no pain, and 10 means worst imaginable pain? 6   Global Mental Health Score 8   Global Physical Health Score 10   PROMIS TOTAL - SUBSCORES 18     [place] Suicide Severity Rating Scale (Lifetime/Recent)      4/30/2019     3:47 PM 1/19/2024     4:55 PM   [place] Suicide Severity Rating (Lifetime/Recent)   Q1 Wished to be Dead (Past Month) no    Q2 Suicidal Thoughts (Past Month) no    Q6 Suicide Behavior (Lifetime) no    Has subject engaged in non-suicidal self-injurious behavior? (Lifetime)  N       Mental Status Assessment:  Appearance:   Appropriate   looks tired  Eye Contact:   Poor  Psychomotor Behavior: Agitated  Restless  pacing   Attitude:   Cooperative   Orientation:   Person Place Time Situation  Speech     Rate / Production:  Hyperverbal  Pressured      Volume:   Normal   Mood:    Anxious  Depressed   Affect:    Worrisome   Thought  Content:  Clear   Thought Form:  Coherent   Insight:    Fair       Safety Issues and Plan for Safety and Risk Management:     Patient denies current fears or concerns for personal safety.  Patient denies current or recent suicidal ideation or behaviors.  Patient denies current or recent homicidal ideation or behaviors.  Patient denies current or recent self injurious behavior or ideation.  Patient denies other safety concerns.  Recommended that patient call 911 or go to the local ED should there be a change in any of these risk factors.  Patient reports there are no firearms in the house.     Diagnostic Criteria:  Generalized Anxiety Disorder  A. Excessive anxiety and worry about a number of events or activities (such as work or school performance).   B. The person finds it difficult to control the worry.  C. Select 3 or more symptoms (required for diagnosis). Only one item is required in children.   - Restlessness or feeling keyed up or on edge.    - Being easily fatigued.    - Difficulty concentrating or mind going blank.    - Irritability.    - Muscle tension.    - Sleep disturbance (difficulty falling or staying asleep, or restless unsatisfying sleep).   D. The focus of the anxiety and worry is not confined to features of an Axis I disorder.  E. The anxiety, worry, or physical symptoms cause clinically significant distress or impairment in social, occupational, or other important areas of functioning.   F. The disturbance is not due to the direct physiological effects of a substance (e.g., a drug of abuse, a medication) or a general medical condition (e.g., hyperthyroidism) and does not occur exclusively during a Mood Disorder, a Psychotic Disorder, or a Pervasive Developmental Disorder.    Major Depressive Disorder  CRITERIA (A-C) REPRESENT A MAJOR DEPRESSIVE EPISODE - SELECT THESE CRITERIA  A) Recurrent episode(s) - symptoms have been present during the same 2-week period and represent a change from previous  functioning 5 or more symptoms (required for diagnosis)   - Depressed mood. Note: In children and adolescents, can be irritable mood.     - Diminished interest or pleasure in all, or almost all, activities.    - Decreased sleep.    - Psychomotor activity agitation.    - Fatigue or loss of energy.    - Feelings of worthlessness or inappropriate guilt.    - Diminished ability to think or concentrate, or indecisiveness.   B) The symptoms cause clinically significant distress or impairment in social, occupational, or other important areas of functioning  C) The episode is not attributable to the physiological effects of a substance or to another medical condition  D) The occurence of major depressive episode is not better explained by other thought / psychotic disorders  E) There has never been a manic episode or hypomanic episode    DSM5 Diagnoses: (Sustained by DSM5 Criteria Listed Above)  Diagnoses: 296.23 (F32.2) Major Depressive Disorder, Single Episode, Severe _  300.02 (F41.1) Generalized Anxiety Disorder  Psychosocial & Contextual Factors: Changes in employment, health concerns. Considering a medical leave.      Treatment Objective(s) Addressed in This Session:             Validate patient thoughts and emotions    Identify stressors      Teach techniques to reduce anxiety.  Introduce Mindfulness       Intervention:                Solution Focused Brief Therapy:                  Strengthen coping skills        Validate and strengthen self resiliency.          Reduce symptoms of depression and sadness.   Reduce symptoms of anxiety; agitation, rumination,  worry      DATA:  Interactive Complexity:   Crisis:     PLAN: (Homework, other):  Provider will continue Diagnostic Assessment. Initial Treatment will focus on: Depressed Mood - and anxiety ; identify stressor, and current skills to manage symptoms.  2.   Provider recommended the following referrals: TC psychiatry.    3.   Information in this assessment was  obtained from the medical record and provided by patient who is a fair and unable to focus  historian.   4.   Follow up scheduled:  1/25/2024        Patient was given the following to do until next session:  discuss FMLA With HR  5.  Anticipated Discharge: Anticipated Discharge Time: 1 - 3 Months     Procedure Code:  Psychotherapy (with patient) - 45 [CPT 69731]    Suicide Risk and Safety Concerns were assessed for. Patient meets the following risk assessment and triage: Patient denied any current/recent/lifetime history of suicidal ideation and/or behaviors.  No safety plan indicated at this time.     Gena Mccain, ANASW

## 2024-01-24 ENCOUNTER — PRE VISIT (OUTPATIENT)
Dept: NEUROSURGERY | Facility: CLINIC | Age: 59
End: 2024-01-24

## 2024-01-24 ENCOUNTER — VIRTUAL VISIT (OUTPATIENT)
Dept: NEUROSURGERY | Facility: CLINIC | Age: 59
End: 2024-01-24
Attending: NEUROLOGICAL SURGERY
Payer: COMMERCIAL

## 2024-01-24 ENCOUNTER — TELEPHONE (OUTPATIENT)
Dept: BEHAVIORAL HEALTH | Facility: CLINIC | Age: 59
End: 2024-01-24

## 2024-01-24 VITALS — WEIGHT: 150 LBS | BODY MASS INDEX: 24.21 KG/M2

## 2024-01-24 DIAGNOSIS — M99.9 NONALLOPATHIC LESION OF THORACIC REGION: Primary | ICD-10-CM

## 2024-01-24 PROCEDURE — 99213 OFFICE O/P EST LOW 20 MIN: CPT | Mod: 95 | Performed by: NEUROLOGICAL SURGERY

## 2024-01-24 ASSESSMENT — PATIENT HEALTH QUESTIONNAIRE - PHQ9: SUM OF ALL RESPONSES TO PHQ QUESTIONS 1-9: 15

## 2024-01-24 NOTE — NURSING NOTE
Is the patient currently in the state of [place]? YES    Visit mode:VIDEO    If the visit is dropped, the patient can be reconnected by: VIDEO VISIT: Text to cell phone:   Telephone Information:   Mobile [phone]       Will anyone else be joining the visit? NO  (If patient encounters technical issues they should call [phone]    How would you like to obtain your AVS? mail    Are changes needed to the allergy or medication list? Pt stated no changes to allergies and Pt stated no med changes    Reason for visit: Consult    Colleen Larson VVF    High PHQ2&9, decline triage, pt stated pt is seeing someone for depression already.

## 2024-01-24 NOTE — LETTER
1/24/2024       RE: Kari Christian  [address] [place] [address]       Dear Colleague,    Thank you for referring your patient, Kari Christian, to the [facility] [place]. Please see a copy of my visit note below.      I had the pleasure to talk to Kari today during a neurosurgical video visit.    Briefly Kari is a 58-year-old woman has been referred to me by Dr. Pierre.  He had seen her in his neurosurgery clinic on November 28, 2023.  At that time she was referred because of pain in the thoracic region of her back in addition to paresthesias and some weakness in the left leg.  An MRI had been performed and shows flow voids in the thoracic spine.  She has been referred for a diagnostic spinal angiogram.    Today she confirmed the symptoms that she has been experiencing.  I went over the MRI with her and explained the findings.  I talked her about the procedure of a diagnostic spinal angiogram.  I would do this under general anesthesia.  Risks include injury to any of the blood vessels that the catheter removed through the potential for spinal cord stroke or bleeding at her access point.  She understands the risks and benefits.  She understands the rationale for the procedure which is to look for some form of arteriovenous fistula.  She would like to proceed with the procedure and understands the risk and benefits.    I will ask my office to schedule her for a spinal angiogram under general anesthesia.      Again, thank you for allowing me to participate in the care of your patient.      Sincerely,    Howie Abreu MD

## 2024-01-24 NOTE — PROGRESS NOTES
Virtual Visit Details    Type of service:  Video Visit     I had the pleasure to talk to Kari today during a neurosurgical video visit.    Briefly Kari is a 58-year-old woman has been referred to me by Dr. Pierre.  He had seen her in his neurosurgery clinic on November 28, 2023.  At that time she was referred because of pain in the thoracic region of her back in addition to paresthesias and some weakness in the left leg.  An MRI had been performed and shows flow voids in the thoracic spine.  She has been referred for a diagnostic spinal angiogram.    Today she confirmed the symptoms that she has been experiencing.  I went over the MRI with her and explained the findings.  I talked her about the procedure of a diagnostic spinal angiogram.  I would do this under general anesthesia.  Risks include injury to any of the blood vessels that the catheter removed through the potential for spinal cord stroke or bleeding at her access point.  She understands the risks and benefits.  She understands the rationale for the procedure which is to look for some form of arteriovenous fistula.  She would like to proceed with the procedure and understands the risk and benefits.    I will ask my office to schedule her for a spinal angiogram under general anesthesia.

## 2024-01-24 NOTE — TELEPHONE ENCOUNTER
Writer spoke with patient on que and rescheduled appointment for 01/30/2024 @ 11:30 am.    Swati Miramontes  01/24/2024  1226

## 2024-01-25 ENCOUNTER — TELEPHONE (OUTPATIENT)
Dept: NEUROSURGERY | Facility: CLINIC | Age: 59
End: 2024-01-25
Payer: COMMERCIAL

## 2024-01-25 ENCOUNTER — HOSPITAL ENCOUNTER (OUTPATIENT)
Facility: CLINIC | Age: 59
End: 2024-01-25

## 2024-01-25 NOTE — TELEPHONE ENCOUNTER
Left voicemail for patient regarding scheduled surgery with Dr. Abreu.  Provided contact number to discuss.  [phone]    Vera Callahan, on 1/25/2024 at 12:56 PM

## 2024-01-25 NOTE — TELEPHONE ENCOUNTER
Patient is scheduled for surgery with: Dr. Abreu    Surgery Date: 4/3     Location: [facility]    H&P: to be completed by Primary Care team - patient instructed to schedule. Patient stated this will be schedule with [facility]     Post-op:  4/17, in-person visit, with Dr Abreu    Patient is aware of the arrival and procedure times.      Patient questions/concerns:  Patient would like to know the risks and benefits of having the procedure. She would also like to know what to expect if she doesn't have the procedure. She needs information mailed to her with the location of the procedure. She was concerned about the cost of the procedure, advised prior authorization team will reach out to her with information now that the procedure is scheduled. Will have [facility] reach out to patient to discuss.      Surgery packet N/A       Vera Callahan on 1/25/2024 at 3:26 PM

## 2024-01-25 NOTE — PATIENT INSTRUCTIONS
Scheduling will contact you to make arrangements for your angiogram. You will need a  home and someone that can stay with you through the night. Do not eat for 8 hours prior to arrival time. You may drink clear liquids (includes water, Jell-O, clear broth, apple juice or any non-carbonated beverage that you can see through) until 2 hours prior to arrival time. You may take your medications with a sip of water. You will check in at the Gold waiting room at the [facility] 2 hours prior to your procedure.     If you have questions regarding your procedure, please contact us at [phone], option [phone].    If you need to cancel, reschedule or have procedure scheduling related questions, please call Neuroendovascular IR Procedure Scheduling at [phone].    Thank you,  Estela Yarbrough, RN & Korin Abraham, RN, CNRN, SCRN  Stroke & Endovascular Care Coordinators    Elvira Dunn RN BSN  Neurosurgery Care Coordinator

## 2024-01-30 ENCOUNTER — VIRTUAL VISIT (OUTPATIENT)
Dept: BEHAVIORAL HEALTH | Facility: CLINIC | Age: 59
End: 2024-01-30
Payer: COMMERCIAL

## 2024-01-30 DIAGNOSIS — F33.1 MAJOR DEPRESSIVE DISORDER, RECURRENT EPISODE, MODERATE (H): Primary | ICD-10-CM

## 2024-01-30 DIAGNOSIS — F41.1 GENERALIZED ANXIETY DISORDER: ICD-10-CM

## 2024-01-31 ASSESSMENT — ANXIETY QUESTIONNAIRES
5. BEING SO RESTLESS THAT IT IS HARD TO SIT STILL: NOT AT ALL
1. FEELING NERVOUS, ANXIOUS, OR ON EDGE: SEVERAL DAYS
GAD7 TOTAL SCORE: 5
3. WORRYING TOO MUCH ABOUT DIFFERENT THINGS: SEVERAL DAYS
7. FEELING AFRAID AS IF SOMETHING AWFUL MIGHT HAPPEN: SEVERAL DAYS
4. TROUBLE RELAXING: NOT AT ALL
6. BECOMING EASILY ANNOYED OR IRRITABLE: NOT AT ALL
IF YOU CHECKED OFF ANY PROBLEMS ON THIS QUESTIONNAIRE, HOW DIFFICULT HAVE THESE PROBLEMS MADE IT FOR YOU TO DO YOUR WORK, TAKE CARE OF THINGS AT HOME, OR GET ALONG WITH OTHER PEOPLE: SOMEWHAT DIFFICULT
GAD7 TOTAL SCORE: 5
2. NOT BEING ABLE TO STOP OR CONTROL WORRYING: MORE THAN HALF THE DAYS

## 2024-01-31 ASSESSMENT — COLUMBIA-SUICIDE SEVERITY RATING SCALE - C-SSRS
2. HAVE YOU ACTUALLY HAD ANY THOUGHTS OF KILLING YOURSELF?: NO
1. SINCE LAST CONTACT, HAVE YOU WISHED YOU WERE DEAD OR WISHED YOU COULD GO TO SLEEP AND NOT WAKE UP?: NO

## 2024-01-31 ASSESSMENT — PATIENT HEALTH QUESTIONNAIRE - PHQ9: SUM OF ALL RESPONSES TO PHQ QUESTIONS 1-9: 7

## 2024-02-01 NOTE — PROGRESS NOTES
Transition Clinic Progress Note   Discharge Summary    Discharge Summary Date:  2024  Multiple Sessions    Client Name:  Kari Christian MRN#: [id number] YOB: 1965    Service Modality: Service Modality: Video Visit:      Provider verified identity through the following two step process.  Patient provided:  Patient photo, Patient , and Patient address    Telemedicine Visit: The patient's condition can be safely assessed and treated via synchronous audio and visual telemedicine encounter.      Reason for Telemedicine Visit: Patient has requested telehealth visit    Originating Site (Patient Location): Patient's home    Distant Site (Provider Location): [facility]    Consent:  The patient/guardian has verbally consented to: the potential risks and benefits of telemedicine (video visit) versus in person care; bill my insurance or make self-payment for services provided; and responsibility for payment of non-covered services.     Patient would like the video invitation sent by:  My Chart    Mode of Communication:  Video Conference via [facility]    Distant Location (Provider):  Off-site    As the provider I attest to compliance with applicable laws and regulations related to telemedicine.    Service Type: Individual     VISIT TIME START: 5  VISIT TIME END: 1225      Session Length: TC Session Length: 45 (38-52 Minutes)     Session #: 2     Attendees: Client attended alone    Why is patient discharging from the clinic? Other: spouse is retirering.  Patient concerned she will not have insurance. She plans to go on the insurance plan at her employer. She will call TC and this provider when she is ready to start therapy again.    Level of Care of Patient Discharge: Other: No other service at this time.    Informed Consent and Assessment Methods    Provided at intake on 2024    Progress Since Last Session (Related to Symptoms / Goals /  Homework):   Symptoms: Improving decreased anxiety.   Anxiety has decreased from 18 to 7, depression from 15 to 5.  Homework: Partially completed  Patient Presentation:       Focus of Treatment Objective(s):  Client's presenting concerns included: Anxiety - changes at place of employment with challenges in completing the work in time allotted.  Stage of Change at time of Discharge: PREPARATION (Decided to change - considering how)    Intervention:   Solution Focused Brief Therapy:                  Strengthen coping skills        Validate and strengthen self resiliency.    Reduce symptoms of depression and sadness.   Reduce symptoms of anxiety; agitation, rumination, worry     Medication Adherence:  Yes    Chemical Use:  No    ASSESSMENT:  Assessments completed prior to visit:     The following assessments were completed by patient for this visit:  PHQ9:       3/19/2021     9:27 AM 3/19/2021    11:52 AM 5/27/2022    12:47 PM 6/29/2023    12:44 PM 1/4/2024     5:38 PM 1/24/2024     3:57 PM 1/31/2024     8:00 PM   PHQ-9 SCORE   PHQ-9 Total Score MyChart 13 (Moderate depression)  10 (Moderate depression) 13 (Moderate depression)      PHQ-9 Total Score 13 15 10 13 20 15 7     GAD7:       3/22/2019    11:46 AM 7/23/2019     4:12 PM 3/19/2021    11:52 AM 5/27/2022    12:47 PM 6/29/2023    12:45 PM 1/4/2024     5:38 PM 1/31/2024     8:00 PM   AUDREY-7 SCORE   Total Score    9 (mild anxiety) 5 (mild anxiety)     Total Score 12 10 12 9 5 18 5     PROMIS Parent Proxy Scale V1.0 Global Health 7+2: No questionnaires on file.  [place] Suicide Severity Rating Scale (Lifetime/Recent)      4/30/2019     3:47 PM 1/19/2024     4:55 PM   [place] Suicide Severity Rating (Lifetime/Recent)   Q1 Wished to be Dead (Past Month) no    Q2 Suicidal Thoughts (Past Month) no    Q6 Suicide Behavior (Lifetime) no    Has subject engaged in non-suicidal self-injurious behavior? (Lifetime)  N     [place] Suicide Severity Rating Scale (Short  Version)      4/30/2019     3:47 PM 1/31/2024     8:00 PM   [place] Suicide Severity Rating (Short Version)   Q1 Wished to be Dead (Past Month) no    Q2 Suicidal Thoughts (Past Month) no    Q6 Suicide Behavior (Lifetime) no    1. Wish to be Dead (Since Last Contact)  N   2. Non-Specific Active Suicidal Thoughts (Since Last Contact)  N   Calculated C-SSRS Risk Score (Since Last Contact)  No Risk Indicated        Assessment: Current Emotional / Mental Status (status of significant symptoms):  Risk status (Self / Other harm or suicidal ideation)  Client denies current fears or concerns for personal safety.  Client denies current or recent suicidal ideation or behaviors.  Client denies current or recent homicidal ideation or behaviors.  Client denies current or recent self injurious behavior or ideation.  Client denies other safety concerns.  A safety and risk management plan has not been developed at this time, however client was given the after-hours number should there be a change in any of these risk factors.    Appearance:   Appropriate   Eye Contact:   Good   Psychomotor Behavior: Normal   Attitude:   Cooperative   Orientation:   All  Speech   Rate / Production: Normal    Volume:  Normal   Mood:    Anxious  Depressed   Affect:    Blunted   Thought Content:  Clear   Thought Form:  Coherent  Logical   Insight:   Good     DSM5 Diagnoses: (Sustained by DSM5 Criteria Listed Above)  Diagnoses: 296.22 (F32.1)  Major Depressive Disorder, Single Episode, Moderate _ and With anxious distress  300.02 (F41.1) Generalized Anxiety Disorder  Psychosocial & Contextual Factors: Changes in  employment, health concerns. Considering a medical leave.    Reason for Discharge:  Insurance: spouse is retiring worried she won't have insurance for a  for patient care.     Aftercare Plan:  Client will return to  when ready and has new insurance.  Follow up with [facility]    Procedure Code: Psychotherapy (with patient) - 45 [CPT 07584]    Gena  JATINDER Mccain, [facility]  January 31, 2024

## 2024-02-07 DIAGNOSIS — F33.1 MODERATE EPISODE OF RECURRENT MAJOR DEPRESSIVE DISORDER (H): ICD-10-CM

## 2024-02-07 DIAGNOSIS — F41.9 ANXIETY: ICD-10-CM

## 2024-02-07 RX ORDER — SERTRALINE HYDROCHLORIDE 25 MG/1
25 TABLET, FILM COATED ORAL DAILY
Qty: 90 TABLET | Refills: 1 | Status: SHIPPED | OUTPATIENT
Start: 2024-02-07 | End: 2024-08-05

## 2024-02-08 ENCOUNTER — TELEPHONE (OUTPATIENT)
Dept: INTERNAL MEDICINE | Facility: CLINIC | Age: 59
End: 2024-02-08

## 2024-03-05 ENCOUNTER — TELEPHONE (OUTPATIENT)
Dept: NEUROSURGERY | Facility: CLINIC | Age: 59
End: 2024-03-05
Payer: COMMERCIAL

## 2024-03-05 NOTE — TELEPHONE ENCOUNTER
Patient calling to discuss Spinal Angio and what it is for.  Explained have an abnormal blood flow and it needs to be evaluated, not wanting it to bleed or rupture.    Patient approves of procedure and will keep appointments.    Patient asking for pre op information by mail.    Note sent to Neurology nursing.    Voices understanding.

## 2024-03-05 NOTE — TELEPHONE ENCOUNTER
[facility] Call Center    Phone Message    May a detailed message be left on voicemail: yes     Reason for Call: Other: Pt calling stating she wants to cancel her surgery on 4/3 because she isn't quite understanding what is going to be done and would like further explanation to understand and ask questions. Please call back to advise      Action Taken: Message routed to:  [facility] Neurosurgery    Travel Screening: Not Applicable

## 2024-03-06 ENCOUNTER — PATIENT OUTREACH (OUTPATIENT)
Dept: NEUROLOGY | Facility: CLINIC | Age: 59
End: 2024-03-06
Payer: COMMERCIAL

## 2024-03-06 NOTE — PROGRESS NOTES
LVMM informing patient instructions will be sent. Encouraged to return call.     Korin Abraham RN 3/6/2024 3:41 PM

## 2024-03-06 NOTE — PATIENT INSTRUCTIONS
You are scheduled for a Diagnostic Spinal Angiogram, under general anesthesia, with Dr. Abreu on 4/3/24. Arrival Time: 6:00 am. Procedure Time: 8:00 am.    Please follow these instructions:    * You will need a pre-op physical within 30 days prior to your procedure. You may do this with your primary care provider or with the Pre-Operative Assessment Center (PAC), located at [facility] at [address] [place] [address]. The PAC phone number is [phone].    * Check in at the Surgery Admission Unit (3C), on the third floor, at the [facility]. The address is [address] [place] [address]. The phone number is [phone].     * Nothing to eat for 8 hours prior to arrival time. You may drink clear liquids (includes water, Jell-O, clear broth, apple juice or any non-carbonated beverage that you can see through) up until 2 hours prior to arrival time.     * You may take your medications with a sip of water the morning of the procedure.     * You will be discharged the same day. You must have a  home and someone that can stay with you through the night.     PLEASE NOTE our COVID-19 visitors policy: Adult surgical and procedural patients can have two visitors throughout the surgery process. The two visitors may include children of any age; please note, children cannot stay in the waiting room alone.    All discharge instructions will be given to the  or volunteer. Documentation for the post-operative plan will be given to the patient and . Patients are required to have someone to stay with them for 24 hours after their procedure.    If you have questions regarding your procedure, please contact me at [phone], option 1.    If you need to cancel, reschedule or have procedure scheduling related questions, please call [facility] IR Procedure Scheduling at [phone].    Thank you,  Korin Abraham RN, CNRN,  SCRN  Estela Maurer, RN, BSN  Stroke & Neuroendovascular Care Coordinator

## 2024-03-13 ENCOUNTER — OFFICE VISIT (OUTPATIENT)
Dept: INTERNAL MEDICINE | Facility: CLINIC | Age: 59
End: 2024-03-13
Payer: COMMERCIAL

## 2024-03-13 VITALS
DIASTOLIC BLOOD PRESSURE: 68 MMHG | HEIGHT: 66 IN | RESPIRATION RATE: 14 BRPM | HEART RATE: 90 BPM | BODY MASS INDEX: 23.42 KG/M2 | SYSTOLIC BLOOD PRESSURE: 112 MMHG | WEIGHT: 145.7 LBS | OXYGEN SATURATION: 97 %

## 2024-03-13 DIAGNOSIS — Z01.818 ENCOUNTER FOR PREOPERATIVE ASSESSMENT: Primary | ICD-10-CM

## 2024-03-13 DIAGNOSIS — R53.83 OTHER FATIGUE: ICD-10-CM

## 2024-03-13 DIAGNOSIS — N95.1 MENOPAUSAL SYNDROME (HOT FLASHES): ICD-10-CM

## 2024-03-13 DIAGNOSIS — M99.9 NONALLOPATHIC LESION OF THORACIC REGION: ICD-10-CM

## 2024-03-13 DIAGNOSIS — D58.0 HEREDITARY SPHEROCYTOSIS (H): ICD-10-CM

## 2024-03-13 DIAGNOSIS — F33.1 MODERATE EPISODE OF RECURRENT MAJOR DEPRESSIVE DISORDER (H): ICD-10-CM

## 2024-03-13 DIAGNOSIS — E55.9 VITAMIN D DEFICIENCY: ICD-10-CM

## 2024-03-13 LAB
BASOPHILS # BLD AUTO: 0 10E3/UL (ref 0–0.2)
BASOPHILS NFR BLD AUTO: 0 %
EOSINOPHIL # BLD AUTO: 0.4 10E3/UL (ref 0–0.7)
EOSINOPHIL NFR BLD AUTO: 6 %
ERYTHROCYTE [DISTWIDTH] IN BLOOD BY AUTOMATED COUNT: 20.7 % (ref 10–15)
FOLATE SERPL-MCNC: 9.5 NG/ML (ref 4.6–34.8)
HCT VFR BLD AUTO: 29.3 % (ref 35–47)
HGB BLD-MCNC: 10.2 G/DL (ref 11.7–15.7)
IMM GRANULOCYTES # BLD: 0 10E3/UL
IMM GRANULOCYTES NFR BLD: 0 %
LYMPHOCYTES # BLD AUTO: 1.9 10E3/UL (ref 0.8–5.3)
LYMPHOCYTES NFR BLD AUTO: 25 %
MCH RBC QN AUTO: 30.2 PG (ref 26.5–33)
MCHC RBC AUTO-ENTMCNC: 34.8 G/DL (ref 31.5–36.5)
MCV RBC AUTO: 87 FL (ref 78–100)
MONOCYTES # BLD AUTO: 0.5 10E3/UL (ref 0–1.3)
MONOCYTES NFR BLD AUTO: 6 %
NEUTROPHILS # BLD AUTO: 4.7 10E3/UL (ref 1.6–8.3)
NEUTROPHILS NFR BLD AUTO: 63 %
PLATELET # BLD AUTO: 253 10E3/UL (ref 150–450)
RBC # BLD AUTO: 3.38 10E6/UL (ref 3.8–5.2)
WBC # BLD AUTO: 7.5 10E3/UL (ref 4–11)

## 2024-03-13 PROCEDURE — 84443 ASSAY THYROID STIM HORMONE: CPT | Performed by: INTERNAL MEDICINE

## 2024-03-13 PROCEDURE — 80053 COMPREHEN METABOLIC PANEL: CPT | Performed by: INTERNAL MEDICINE

## 2024-03-13 PROCEDURE — 82746 ASSAY OF FOLIC ACID SERUM: CPT | Performed by: INTERNAL MEDICINE

## 2024-03-13 PROCEDURE — 83550 IRON BINDING TEST: CPT | Performed by: INTERNAL MEDICINE

## 2024-03-13 PROCEDURE — 82306 VITAMIN D 25 HYDROXY: CPT | Performed by: INTERNAL MEDICINE

## 2024-03-13 PROCEDURE — 83540 ASSAY OF IRON: CPT | Performed by: INTERNAL MEDICINE

## 2024-03-13 PROCEDURE — 85025 COMPLETE CBC W/AUTO DIFF WBC: CPT | Performed by: INTERNAL MEDICINE

## 2024-03-13 PROCEDURE — 36415 COLL VENOUS BLD VENIPUNCTURE: CPT | Performed by: INTERNAL MEDICINE

## 2024-03-13 PROCEDURE — 99214 OFFICE O/P EST MOD 30 MIN: CPT | Performed by: INTERNAL MEDICINE

## 2024-03-13 PROCEDURE — 82607 VITAMIN B-12: CPT | Performed by: INTERNAL MEDICINE

## 2024-03-13 PROCEDURE — 82728 ASSAY OF FERRITIN: CPT | Performed by: INTERNAL MEDICINE

## 2024-03-13 PROCEDURE — 84439 ASSAY OF FREE THYROXINE: CPT | Performed by: INTERNAL MEDICINE

## 2024-03-13 ASSESSMENT — ASTHMA QUESTIONNAIRES
QUESTION_1 LAST FOUR WEEKS HOW MUCH OF THE TIME DID YOUR ASTHMA KEEP YOU FROM GETTING AS MUCH DONE AT WORK, SCHOOL OR AT HOME: SOME OF THE TIME
QUESTION_5 LAST FOUR WEEKS HOW WOULD YOU RATE YOUR ASTHMA CONTROL: SOMEWHAT CONTROLLED
QUESTION_3 LAST FOUR WEEKS HOW OFTEN DID YOUR ASTHMA SYMPTOMS (WHEEZING, COUGHING, SHORTNESS OF BREATH, CHEST TIGHTNESS OR PAIN) WAKE YOU UP AT NIGHT OR EARLIER THAN USUAL IN THE MORNING: ONCE A WEEK
QUESTION_4 LAST FOUR WEEKS HOW OFTEN HAVE YOU USED YOUR RESCUE INHALER OR NEBULIZER MEDICATION (SUCH AS ALBUTEROL): TWO OR THREE TIMES PER WEEK
ACT_TOTALSCORE: 15
ACT_TOTALSCORE: 15
QUESTION_2 LAST FOUR WEEKS HOW OFTEN HAVE YOU HAD SHORTNESS OF BREATH: THREE TO SIX TIMES A WEEK

## 2024-03-13 ASSESSMENT — PATIENT HEALTH QUESTIONNAIRE - PHQ9
SUM OF ALL RESPONSES TO PHQ QUESTIONS 1-9: 12
10. IF YOU CHECKED OFF ANY PROBLEMS, HOW DIFFICULT HAVE THESE PROBLEMS MADE IT FOR YOU TO DO YOUR WORK, TAKE CARE OF THINGS AT HOME, OR GET ALONG WITH OTHER PEOPLE: VERY DIFFICULT
SUM OF ALL RESPONSES TO PHQ QUESTIONS 1-9: 12

## 2024-03-13 NOTE — PROGRESS NOTES
Preoperative Evaluation  [address] [address]  Phone: [phone]  Primary Provider: Blessing Ling  Pre-op Performing Provider: WEI LLAMAS  Mar 13, 2024     Kari is a 58 year old presenting for the following: Pre-Op Exam    Surgical Information  Surgery/Procedure: ANESTHESIA, IN NON-OPERATING ROOM SETTING Spinal Angiogram Interventional Radiology @0800   Surgery Location: [facility]   Surgeon: Dr Abreu  Surgery Date: 4/3/2024  Time of Surgery: 8am  Where patient plans to recover: At home with family  Fax number for surgical facility: Note does not need to be faxed, will be available electronically in Epic.    Assessment & Plan   The proposed surgical procedure is considered INTERMEDIATE risk.    Encounter for preoperative assessment  Nonallopathic lesion of thoracic region  Approval given to proceed with proposed procedure without further diagnostic evaluation. Most recent labs reviewed. Patient able to perform at least 4 METS without getting anginal symptoms. Medications were reviewed in detail today. On the morning of surgery, take all normal medications with a small sip of water. Resume all medications post-operatively at the same doses unless otherwise directed by surgical care team.    Recommendation  APPROVAL GIVEN to proceed with proposed procedure, without further diagnostic evaluation.    F/u with regular PCP at regular interval or sooner PRN    Signed Electronically by:  Wei Llamas MD, MPH  [facility]  Internal Medicine    Subjective   HPI related to upcoming procedure: Kari presents for a pre-op exam. She has no other complaints. She reports no personal history of cardiac disease. She is able to walk up a flight of stairs without developing chest pain.        3/13/2024     3:58 PM   Preop Questions   1. Have you ever had a heart attack or stroke? No   2. Have you ever had surgery  on your heart or blood vessels, such as a stent placement, a coronary artery bypass, or surgery on an artery in your head, neck, heart, or legs? No   3. Do you have chest pain with activity? No   4. Do you have a history of heart failure? No   5. Do you currently have a cold, bronchitis or symptoms of other infection? No   6. Do you have a cough, shortness of breath, or wheezing? YES - Chronic cough, smoker   7. Do you or anyone in your family have previous history of blood clots? YES - Her mother   8. Do you or does anyone in your family have a serious bleeding problem such as prolonged bleeding following surgeries or cuts? UNKNOWN   9. Have you ever had problems with anemia or been told to take iron pills? YES   10. Have you had any abnormal blood loss such as black, tarry or bloody stools, or abnormal vaginal bleeding? No   11. Have you ever had a blood transfusion? No   12. Are you willing to have a blood transfusion if it is medically needed before, during, or after your surgery? Yes   13. Have you or any of your relatives ever had problems with anesthesia? UNKNOWN   14. Do you have sleep apnea, excessive snoring or daytime drowsiness? YES   14a. Do you have a CPAP machine? No   15. Do you have any artifical heart valves or other implanted medical devices like a pacemaker, defibrillator, or continuous glucose monitor? No   16. Do you have artificial joints? No   17. Are you allergic to latex? No   18. Is there any chance that you may be pregnant? No     Health Care Directive  Patient does not have a Health Care Directive or Living Will    Preoperative Review of    reviewed - controlled substances prescribed by other outside provider(s).    Patient Active Problem List    Diagnosis Date Noted    H/O LEEP      Priority: Medium     2004 NIL pap  2016 and 2018 NIL pap per abstracted records  5/27/22 NIL pap, Neg HPV. Plan cotest in 3 years.       Deafness, left 05/27/2022     Priority: Medium    Mild  persistent asthma, unspecified whether complicated 05/27/2022     Priority: Medium    Other fatigue 05/27/2022     Priority: Medium    Family history of ischemic heart disease 03/19/2021     Priority: Medium    Labyrinthitis of left ear 05/07/2019     Priority: Medium    Intermittent asthma 01/19/2015     Priority: Medium    Tobacco abuse 02/20/2012     Priority: Medium    Hyperthyroidism 09/01/2011     Priority: Medium    Irritable bowel syndrome with diarrhea 08/31/2011     Priority: Medium    Vitamin D deficiency 04/04/2011     Priority: Medium     (Problem list name updated by automated process. Provider to review and confirm.)      Anxiety 01/28/2011     Priority: Medium    CARDIOVASCULAR SCREENING; LDL GOAL LESS THAN 160 10/31/2010     Priority: Medium    Major depressive disorder, single episode, mild (H24)      Priority: Medium    Hereditary spherocytosis (H24) 06/03/2005     Priority: Medium      Past Medical History:   Diagnosis Date    Anemia, unspecified     normal is 7-10    Hereditary spherocytosis (H24)     no past transfusion;     Major depressive disorder, single episode, mild (H24)     Thyroid disease      Past Surgical History:   Procedure Laterality Date    CHOLECYSTECTOMY  2007    COLONOSCOPY  09/2014    COLONOSCOPY  01/15/2018    Dr. Wade [facility]    ESOPHAGOSCOPY, GASTROSCOPY, DUODENOSCOPY (EGD), COMBINED N/A 01/15/2018    Procedure: COMBINED ESOPHAGOSCOPY, GASTROSCOPY, DUODENOSCOPY (EGD), BIOPSY SINGLE OR MULTIPLE;  ESOPHAGOSCOPY, GASTROSCOPY, DUODENOSCOPY (EGD) with cold biopsies of duodenum and  COLONOSCOPY with polypectomy;  Surgeon: Chad Wade MD;  Location:  GI    HEAD & NECK SURGERY      wisdom teeth removed    LEEP TX, CERVICAL      Unknow date and results.     Current Outpatient Medications   Medication Sig Dispense Refill    albuterol (PROAIR HFA/PROVENTIL HFA/VENTOLIN HFA) 108 (90 Base) MCG/ACT inhaler Inhale 2 puffs into the lungs every 6 hours as needed for shortness of  "breath 18 g 11    budesonide-formoterol (SYMBICORT) 160-4.5 MCG/ACT Inhaler Inhale 2 puffs into the lungs 2 times daily 10.2 g 11    FOLIC ACID PO Take 1 mg by mouth as needed       sertraline (ZOLOFT) 25 MG tablet TAKE 1 TABLET BY MOUTH EVERY DAY 90 tablet 1    vitamin D3 (CHOLECALCIFEROL) 50 mcg (2000 units) tablet Take 2 tablets (100 mcg) by mouth daily 200 tablet 3     Allergies   Allergen Reactions    Cephalexin Itching    Lexapro [Escitalopram]      dizzy    Paxil [Paroxetine]      Dizzy      Sulfa Antibiotics     Wellbutrin [Bupropion]      Dizzy        Social History     Tobacco Use    Smoking status: Every Day     Packs/day: 0.50     Years: 40.00     Additional pack years: 0.00     Total pack years: 20.00     Types: Cigarettes    Smokeless tobacco: Never    Tobacco comments:     current smoker  40 years at 2022   Substance Use Topics    Alcohol use: Not Currently     Alcohol/week: 2.0 - 3.0 standard drinks of alcohol     Types: 2 - 3 Standard drinks or equivalent per week     Comment: socially     History   Drug Use No         Review of Systems  8pt ROS reviewed and all other systems negative unless otherwise stated above.    Objective    /68   Pulse 90   Resp 14   Ht 1.676 m (5' 6\")   Wt 66.1 kg (145 lb 11.2 oz)   LMP 04/28/2009   SpO2 97%   BMI 23.52 kg/m     Estimated body mass index is 23.52 kg/m  as calculated from the following:    Height as of this encounter: 1.676 m (5' 6\").    Weight as of this encounter: 66.1 kg (145 lb 11.2 oz).    Physical Exam  GENERAL: alert and in no distress.  EYES: conjunctivae/corneas clear. EOMs grossly intact  HENT: Facies symmetric.  RESP: CTAB, no w/r/r.  CV: RRR, no m/r/g.  MSK: Moves all four extremities freely.  SKIN: No significant ulcers, lesions, or rashes on the visualized portions of the skin  NEURO: CN II-XII grossly intact.    Recent Labs   Lab Test 06/29/23  1337 05/27/22  1429   HGB 10.7* 11.2*    230    139   POTASSIUM 3.8 3.7 "   CR 0.69 0.66      Diagnostics  Labs pending at this time.  Results will be reviewed when available.   No EKG required, no history of coronary heart disease, significant arrhythmia, peripheral arterial disease or other structural heart disease.    Revised Cardiac Risk Index (RCRI)  The patient has the following serious cardiovascular risks for perioperative complications:   - No serious cardiac risks = 0 points     RCRI Interpretation: 0 points: Class I (very low risk - 0.4% complication rate)    Signed Electronically by: Wei Chávez MD  Copy of this evaluation report is provided to requesting physician.    Answers submitted by the patient for this visit:  Patient Health Questionnaire (Submitted on 3/13/2024)  If you checked off any problems, how difficult have these problems made it for you to do your work, take care of things at home, or get along with other people?: Very difficult  PHQ9 TOTAL SCORE: 12

## 2024-03-14 LAB
ALBUMIN SERPL BCG-MCNC: 4.4 G/DL (ref 3.5–5.2)
ALP SERPL-CCNC: 99 U/L (ref 40–150)
ALT SERPL W P-5'-P-CCNC: 14 U/L (ref 0–50)
ANION GAP SERPL CALCULATED.3IONS-SCNC: 9 MMOL/L (ref 7–15)
AST SERPL W P-5'-P-CCNC: 18 U/L (ref 0–45)
BILIRUB SERPL-MCNC: 1.8 MG/DL
BUN SERPL-MCNC: 16.3 MG/DL (ref 8–23)
CALCIUM SERPL-MCNC: 8.7 MG/DL (ref 8.8–10.2)
CHLORIDE SERPL-SCNC: 106 MMOL/L (ref 98–107)
CREAT SERPL-MCNC: 0.59 MG/DL (ref 0.51–0.95)
DEPRECATED HCO3 PLAS-SCNC: 25 MMOL/L (ref 22–29)
EGFRCR SERPLBLD CKD-EPI 2021: >90 ML/MIN/1.73M2
FERRITIN SERPL-MCNC: 223 NG/ML (ref 11–328)
GLUCOSE SERPL-MCNC: 87 MG/DL (ref 70–99)
IRON BINDING CAPACITY (ROCHE): 252 UG/DL (ref 240–430)
IRON SATN MFR SERPL: 26 % (ref 15–46)
IRON SERPL-MCNC: 65 UG/DL (ref 37–145)
POTASSIUM SERPL-SCNC: 3.9 MMOL/L (ref 3.4–5.3)
PROT SERPL-MCNC: 6.9 G/DL (ref 6.4–8.3)
SODIUM SERPL-SCNC: 140 MMOL/L (ref 135–145)
T4 FREE SERPL-MCNC: 2.03 NG/DL (ref 0.9–1.7)
TSH SERPL DL<=0.005 MIU/L-ACNC: <0.01 UIU/ML (ref 0.3–4.2)
VIT B12 SERPL-MCNC: 374 PG/ML (ref 232–1245)
VIT D+METAB SERPL-MCNC: 12 NG/ML (ref 20–50)

## 2024-03-15 DIAGNOSIS — E05.90 HYPERTHYROIDISM: Primary | ICD-10-CM

## 2024-03-18 ENCOUNTER — TELEPHONE (OUTPATIENT)
Dept: ENDOCRINOLOGY | Facility: CLINIC | Age: 59
End: 2024-03-18
Payer: COMMERCIAL

## 2024-03-18 NOTE — TELEPHONE ENCOUNTER
RECORDS RECEIVED FROM: Hyperthyroidism   DATE RECEIVED: 3/29/2024   NOTES (FOR ALL VISITS) STATUS DETAILS   OFFICE NOTES from referring provider [facility]:  3/13/24 - PCC OV with Dr. Yates   OFFICE NOTES from other specialist Internal [facility]:  1/24/24 - NEUROSURG OV with Dr. Brady Steven - [place]:  11/28/23 - NEUROSURG OV with Dr. Rico Steven - [place]:  8/20/19 - ENDO OV with Dr. Gutiérrez   ED NOTES N/A    OPERATIVE REPORT  (thyroid, pituitary, adrenal, parathyroid) N/A    MEDICATION LIST Internal    IMAGING      XR (Chest) Internal [facility]:  3/19/21 - XR Chest   CT (HEAD/NECK/CHEST/ABDOMEN) [facility]:  10/5/23 - CT Chest  8/21/22 - CT Chest  4/30/19 - CT Head/Neck   NUCLEAR  Internal [facility]:  8/8/19 - NM Thyroid   LABS     DIABETES: HBGA1C, CREATININE, FASTING LIPIDS, MICROALBUMIN URINE, POTASSIUM, TSH, T4    THYROID: TSH, T4, CBC, THYRODLONULIN, TOTAL T3, FREE T4, CALCITONIN, CEA Internal   [facility]:  3/13/24 - CMP  3/13/24 - Ferritin  3/13/24 - Iron  3/13/24 - TSH, T4  3/13/24- Vitamin D  6/29/23 - CBC  6/29/23 - Lipid  4/30/19 - Magnesium  3/30/15 - BMP  3/30/15 - HBGA1C

## 2024-03-18 NOTE — TELEPHONE ENCOUNTER
Patient Contacted for the patient to call back and schedule the following:    Appointment type: New endocrine   Provider: Yossi   Return date: 3/29   Specialty phone number: [phone]   Additional appointment(s) needed: NA   Additonal Notes: Spoke to pt and offered appt w Yossi and pt agreed to schedule per Leyla's message     Please offer new with Dr. Lockhart today at 2PM or on call with Yossi 3/19 10AM or any sooner new appointment for new endocrine.     Simi Thibodeaux on 3/18/2024 at 10:09 AM

## 2024-03-26 ENCOUNTER — TELEPHONE (OUTPATIENT)
Dept: NEUROSURGERY | Facility: CLINIC | Age: 59
End: 2024-03-26
Payer: COMMERCIAL

## 2024-03-26 NOTE — TELEPHONE ENCOUNTER
[facility] Call Center    Phone Message    May a detailed message be left on voicemail: yes     Reason for Call: Other: Patient calling to discuss her procedure on 4/3/2024.  She is needing to reschedule due to insurance reasons.  Please call her back.      Action Taken: Message routed to:  [facility] ([facility]; Neurosurgery    Travel Screening: Not Applicable

## 2024-03-26 NOTE — TELEPHONE ENCOUNTER
Spoke with the patient and was able to confirm all scheduled information.     Patient is rescheduled for surgery with: Dr. Abreu    Surgery Date: 7/10     Location: [facility]    H&P: to be completed by Primary Care team - patient instructed to schedule per patient, this will be scheduled with [facility]     Post-op:  7/23, in-person visit, with Dr Abreu    Patient is aware of arrival and procedure times.        Patient questions/concerns: N/A     Surgery packet N/A       Vera Callahan on 3/26/2024 at 10:12 AM

## 2024-03-26 NOTE — TELEPHONE ENCOUNTER
Return call to Kari Pierce asking to reschedule Spinal Angiogram in note below.  Left Kari a message to please call Elvira  [phone] to discuss changing procedure date.

## 2024-03-29 ENCOUNTER — PRE VISIT (OUTPATIENT)
Dept: ENDOCRINOLOGY | Facility: CLINIC | Age: 59
End: 2024-03-29

## 2024-04-02 ENCOUNTER — TELEPHONE (OUTPATIENT)
Dept: GASTROENTEROLOGY | Facility: CLINIC | Age: 59
End: 2024-04-02

## 2024-04-02 NOTE — MR AVS SNAPSHOT
After Visit Summary   8/29/2017    Kari Christian    MRN: [id number]           Patient Information     Date Of Birth          1965        Visit Information        Provider Department      8/29/2017 1:00 PM Brenna Morse MD [facility]        Today's Diagnoses     Encounter for routine adult health examination without abnormal findings    -  1    Irritable bowel syndrome, unspecified type        Gastroesophageal reflux disease without esophagitis        Family history of esophageal cancer           Follow-ups after your visit        Additional Services     GASTROENTEROLOGY ADULT REF PROCEDURE ONLY       Last Lab Result: Creatinine (mg/dL)       Date                     Value                 03/30/2015               0.66             ----------  Body mass index is 21.61 kg/(m^2).     Needed:  No  Language:  English    Patient will be contacted to schedule procedure.     Please be aware that coverage of these services is subject to the terms and limitations of your health insurance plan.  Call member services at your health plan with any benefit or coverage questions.  Any procedures must be performed at a [facility] facility OR coordinated by your clinic's referral office.    Please bring the following with you to your appointment:    (1) Any X-Rays, CTs or MRIs which have been performed.  Contact the facility where they were done to arrange for  prior to your scheduled appointment.    (2) List of current medications   (3) This referral request   (4) Any documents/labs given to you for this referral            GASTROENTEROLOGY ADULT REF PROCEDURE ONLY       Last Lab Result: Creatinine (mg/dL)       Date                     Value                 03/30/2015               0.66             ----------  Body mass index is 21.61 kg/(m^2).     Needed:  No  Language:  English    Patient will be contacted to schedule procedure.     Please be aware that coverage  of these services is subject to the terms and limitations of your health insurance plan.  Call member services at your health plan with any benefit or coverage questions.  Any procedures must be performed at a [facility] facility OR coordinated by your clinic's referral office.    Please bring the following with you to your appointment:    (1) Any X-Rays, CTs or MRIs which have been performed.  Contact the facility where they were done to arrange for  prior to your scheduled appointment.    (2) List of current medications   (3) This referral request   (4) Any documents/labs given to you for this referral                  Who to contact     If you have questions or need follow up information about today's clinic visit or your schedule please contact [facility] directly at [phone].  Normal or non-critical lab and imaging results will be communicated to you by MyChart, letter or phone within 4 business days after the clinic has received the results. If you do not hear from us within 7 days, please contact the clinic through [employer] or phone. If you have a critical or abnormal lab result, we will notify you by phone as soon as possible.  Submit refill requests through [employer] or call your pharmacy and they will forward the refill request to us. Please allow 3 business days for your refill to be completed.          Additional Information About Your Visit        [employer] Information     [employer] gives you secure access to your electronic health record. If you see a primary care provider, you can also send messages to your care team and make appointments. If you have questions, please call your primary care clinic.  If you do not have a primary care provider, please call [phone] and they will assist you.        Care EveryWhere ID     This is your Care EveryWhere ID. This could be used by other organizations to access your [facility] medical records  [id number]        Your Vitals Were     Pulse  "Temperature Height Last Period Pulse Oximetry Breastfeeding?    84 98.6  F (37  C) (Oral) 5' 7\" (1.702 m) 04/28/2009 99% No    BMI (Body Mass Index)                   21.61 kg/m2            Blood Pressure from Last 3 Encounters:   08/29/17 110/70   12/23/16 104/70   11/21/16 90/58    Weight from Last 3 Encounters:   08/29/17 138 lb (62.6 kg)   12/23/16 138 lb (62.6 kg)   11/21/16 138 lb 7 oz (62.8 kg)              We Performed the Following     CBC with platelets differential     Comprehensive metabolic panel     GASTROENTEROLOGY ADULT REF PROCEDURE ONLY     GASTROENTEROLOGY ADULT REF PROCEDURE ONLY     Lipid panel reflex to direct LDL     TSH with free T4 reflex     Vitamin D Deficiency        Primary Care Provider Office Phone # Fax #    Brenna Morse [phone]       [address] [facility] [address]        Equal Access to Services     JAN FRANZ : Hadii nino holley hadasho Soronan, waaxda luqadaha, qaybta kaalmada adeegyada, jennifer florez . So [facility] [phone].    ATENCIÓN: Si habla español, tiene a barajas disposición servicios gratuitos de asistencia lingüística. Llame al [phone].    We comply with applicable federal civil rights laws and [place] laws. We do not discriminate on the basis of race, color, national origin, age, disability sex, sexual orientation or gender identity.            Thank you!     Thank you for choosing [facility]  for your care. Our goal is always to provide you with excellent care. Hearing back from our patients is one way we can continue to improve our services. Please take a few minutes to complete the written survey that you may receive in the mail after your visit with us. Thank you!             Your Updated Medication List - Protect others around you: Learn how to safely use, store and throw away your medicines at www.disposemymeds.org.          This list is accurate as of: 8/29/17  1:51 PM.  Always use " your most recent med list.                   Brand Name Dispense Instructions for use Diagnosis    ADVIL PO           albuterol 108 (90 BASE) MCG/ACT Inhaler    PROAIR HFA/PROVENTIL HFA/VENTOLIN HFA    3 Inhaler    Inhale 2 puffs into the lungs every 6 hours as needed for shortness of breath / dyspnea    SOB (shortness of breath)       FOLIC ACID PO      Take 1 mg by mouth daily        triamcinolone 0.1 % cream    KENALOG    30 g    Apply sparingly to affected area 2-3 times daily for up to 14 days at a time.    Rash          room air

## 2024-04-02 NOTE — TELEPHONE ENCOUNTER
"Endoscopy Scheduling Screen    Have you had a positive Covid test in the last 14 days?  No    What is your communication preference for Instructions and/or Bowel Prep?   Mail/USPS    What insurance is in the chart?  Other:  BCBS    Ordering/Referring Provider: CARYL KAISER   (If ordering provider performs procedure, schedule with ordering provider unless otherwise instructed. )    BMI: Estimated body mass index is 23.52 kg/m  as calculated from the following:    Height as of 3/13/24: 1.676 m (5' 6\").    Weight as of 3/13/24: 66.1 kg (145 lb 11.2 oz).     Sedation Ordered  moderate sedation.   If patient BMI > 50 do not schedule in [facility].    If patient BMI > 45 do not schedule at [facility].    Are you taking methadone or Suboxone?  No    Have you had difficulties, pain, or discomfort during past endoscopy procedures?  No    Are you taking any prescription medications for pain 3 or more times per week?   NO, No RN review required.    Do you have a history of malignant hyperthermia?  No    (Females) Are you currently pregnant?   No     Have you been diagnosed or told you have pulmonary hypertension?   No    Do you have an LVAD?  No    Have you been told you have moderate to severe sleep apnea?  No    Have you been told you have COPD, asthma, or any other lung disease?  Yes     What breathing problems do you have?  Asthma     Do you use home oxygen?  No    Have your breathing problems required an ED visit or hospitalization in the last year?  No    Do you have any heart conditions?  No     Have you ever had or are you waiting for an organ transplant?  No. Continue scheduling, no site restrictions.    Have you had a stroke or transient ischemic attack (TIA aka \"mini stroke\" in the last 6 months?   No    Have you been diagnosed with or been told you have cirrhosis of the liver?   No    Are you currently on dialysis?   No    Do you need assistance transferring?   No    BMI: Estimated body mass index is 23.52 kg/m  as " "calculated from the following:    Height as of 3/13/24: 1.676 m (5' 6\").    Weight as of 3/13/24: 66.1 kg (145 lb 11.2 oz).     Is patients BMI > 40 and scheduling location [facility]?  No    Do you take an injectable medication for weight loss or diabetes (excluding insulin)?  No    Do you take the medication Naltrexone?  No    Do you take blood thinners?  No       Prep   Are you currently on dialysis or do you have chronic kidney disease?  No    Do you have a diagnosis of diabetes?  No    Do you have a diagnosis of cystic fibrosis (CF)?  No    On a regular basis do you go 3 -5 days between bowel movements?  No    BMI > 40?  No    Preferred Pharmacy:    [facility]/pharmacy #[id number] - [place], [place] - [address] AT [place] [address]  [place] [address]  Phone: [phone] Fax: [phone]      Final Scheduling Details     Procedure scheduled  Colonoscopy    Surgeon:  IGNACIO     Date of procedure:  6/17/24     Pre-OP / PAC:   No - Not required for this site.    Location  [facility] - Patient preference.    Sedation   Moderate Sedation - Per order.      Patient Reminders:   You will receive a call from a Nurse to review instructions and health history.  This assessment must be completed prior to your procedure.  Failure to complete the Nurse assessment may result in the procedure being cancelled.      On the day of your procedure, please designate an adult(s) who can drive you home stay with you for the next 24 hours. The medicines used in the exam will make you sleepy. You will not be able to drive.      You cannot take public transportation, ride share services, or non-medical taxi service without a responsible caregiver.  Medical transport services are allowed with the requirement that a responsible caregiver will receive you at your destination.  We require that drivers and caregivers are confirmed prior to your procedure.    "

## 2024-04-18 ENCOUNTER — TELEPHONE (OUTPATIENT)
Dept: GASTROENTEROLOGY | Facility: CLINIC | Age: 59
End: 2024-04-18
Payer: COMMERCIAL

## 2024-04-18 NOTE — TELEPHONE ENCOUNTER
Caller: call to Kari Christian      Reason for Reschedule/Cancellation   (please be detailed, any staff messages or encounters to note?): sooner date offered per previous pt request      Prior to reschedule please review:  Ordering Provider: WILLAM KAISER  Sedation Determined: CS  Does patient have any ASC Exclusions, please identify?: NO      Notes on Cancelled Procedure:  Procedure: Lower Endoscopy [Colonoscopy]   Date: 6/17/24  Location: [facility]; [address] [place] [address]  Surgeon: IGNACIO      Rescheduled: Yes,   Procedure: Lower Endoscopy [Colonoscopy]    Date: 5/1/24   Location: [facility]; [address] [place] [address]   Surgeon: MITCHELL   Sedation Level Scheduled  CS ,  Reason for Sedation Level PER ORDER   Instructions updated and sent: Y     Does patient need PAC or Pre -Op Rescheduled? : N       Did you cancel or rescheduled an EUS procedure? No.

## 2024-04-18 NOTE — LETTER
April 19, 2024      Kari Christian  [address] [place] [address]              We apologize that we have been unable to contact you by phone. We are calling in regards to your upcoming Colonoscopy  procedure that is scheduled on 05.01.2024 to complete pre assessment.    Please contact our pre assessment nursing team at [phone] option 4. We are open Monday through Friday, 7:00am to 5:00pm. Note that failure to complete Nurse assessment phone call will result in your procedure being cancelled.     Our schedules fill up quickly and are in high demand. If you know that you need to cancel, please contact us so that we can accommodate scheduling for other patients. Our endoscopy scheduling team can be reached at [phone] option 2.     Thank you,  [facility] Endoscopy Team                            Sincerely,      [facility] Endoscopy

## 2024-04-18 NOTE — LETTER
April 18, 2024      Kari Christian  [address] [place] [address]              Dear Kari,          Colonoscopy      Procedure date: 5/1/24    Anticipated arrival time: 1:15 PM   (Please note that arrival times may change)    Facility location: [facility]; [address] [place] [address] Check in location: Main entrance at . Come through the roundabout underneath the awning (not the ER entrance).      Important Procedure Reminders:     Prep Instructions:   Instructions on how to prepare for your upcoming procedure are found below. Please read instructions carefully. Deviation from instructions may result in less than desired outcomes and procedure may need to be rescheduled.   If you have additional questions regarding how to prepare for your upcoming procedure, contact our endoscopy pre assessment nurses at [phone] option 4 Monday through Friday 7:00am-5:00pm     Policy:   The medications used during the procedure will make you sleepy, so you won't be able to drive. On the day of your procedure, please have an adult ready to drive you home and stay with you for the next 6 hours.   You can't use public transportation, ride-share services, or non-medical taxi services without a responsible caregiver. Medical transport services are okay, but a caregiver must be there to receive you at your destination.   Make sure your  and caregiver are confirmed before your procedure.      Day of procedure:  Please keep in mind that arrival times may change. Let your  know there might be a one-hour window for changes.  We ask that you please check in at the  with your . Your  should remain on campus.  Expect to be at the procedure center for about 1.5-2.5 hours.    Please do not wear jewelry (i.e. earrings, rings, necklaces, watches, etc). Leave your purse, billfold, credit cards, and other valuables at home.   Bring insurance card and ID.     To cancel or  reschedule your procedure:   Within 14 days of your procedure if you develop any flu-like symptoms (such as fever, cough, shortness of breath), COVID-19 like symptoms or exposure to COVID-19, contact our endoscopy team at [phone] option 4 to determine if procedure can be completed or needs to be delayed.   If you need to cancel or reschedule, our endoscopy scheduling team can be reached at [phone], option 2. Monday through Friday, 7:00am-5:00pm.      Medication Reminders:    Please note the following medication holding recommendations:   N/A        Standard MiraLAX Bowel Prep  Prep Instructions for your Colonoscopy      Please read these instructions carefully at least 7 days prior to your colonoscopy procedure. Be sure to follow all directions completely. The inside of your colon must be clean to allow for a complete examination for the presence of any growths, polyps, and/or abnormalities, as well as their biopsy or removal. A number of tips are included in order to make this part of the procedure as comfortable as possible.    Getting ready:   A nurse will call you to go over instructions and your health history.  It's important to complete the nurse assessment before your procedure. If you don't, your procedure might have to be canceled.  You must arrange for an adult to drive you home after your exam. Your colonoscopy cannot be done unless you have a ride. If you need to use public transportation, someone must ride with you and stay with you for a minimum of 24 hours.  Check with your insurance company to be sure they will cover this exam.  Go to the drug store and buy:  Four (4) - Dulcolax (Bisacodyl) 5mg tablets   8.3 ounce bottle of Miralax powder  64 ounces of Gatorade (not red or purple)     10 ounce bottle of clear magnesium citrate    7 days before the exam:  Talk to your prescribing provider: If you take blood thinners (such as Coumadin, Plavix, Xarelto, Eliquis, Lovenox, or others), these  medications may need to be stopped temporarily before your procedure. Your prescribing provider will tell you what to do.   Talk to your prescribing provider: If you take prescription NSAIDS (such as Sulindac, Celebrex, Mobic, Relafen). Your prescribing provider will tell you what to do.   Stop taking fiber and iron supplements   Stop eating corn, popcorn, nuts and foods that contain seeds. These can stay in the colon for many days and they can clog up the colonoscope.     3 days before the exam:  Begin a low-fiber diet (see below).   Drink at least 4 to 6 large glasses of water or sports drink (not red or purple) each day.     One day before the exam:  Only clear liquid diet is allowed (see below). No solid food should be eaten.   Drink at least 8 to 10 full glasses of clear liquids during the day.   Stop taking NSAID pain relievers, such as Advil, Ibuprofen, Motrin, etc.  You may take Tylenol.  Note: You will start drinking the colonoscopy prep solution at 5 PM. The timing of second step depends on your appointment arrival time. See Steps 1-2 below.    Step One:  At 4 PM, take 2 Dulcolax (Bisacodyl) tablets.   At 4 PM, mix the entire bottle of Miralax with 64 ounces of Gatorade in a pitcher and stir to dissolve the powder. Chill if desired, but do not add ice.   At 5 PM, start drinking an 8-ounce glass of the Miralax and Gatorade mixture every 15 minutes until the pitcher is HALF empty (about 4 glasses).  Store the rest in the refrigerator.   Bowel movements usually begin about one hour after your finish this first dose of Miralax. The stool is likely to be brown at this stage.   After you start drinking the solution, stay near a toilet. You may have watery stools (diarrhea), mild cramping, bloating , and nausea.   You may want to use Vaseline on the skin around your anus after each bowel movement to prevent irritation. You can also use wet wipes to prevent irritation.  Continue to drink clear liquids.     At 10  PM, take 2 Dulcolax (Bisacodyl) tablets  At 10 PM start drinking an 8-ounce glass of Miralax and Gatorade mixture every 15 minutes until the pitcher is empty (about 4 glasses).       Step Two:   If yo If you arrive for your procedure after 11 AM:     At 6 AM on the day of the exam: drink 10 ounces of clear Magnesium Citrate        Day of exam:  You may drink clear liquids only up until 2 hours before your arrival time.  You may take your necessary morning medications with sips of water  Do not take diabetes medicine by mouth until after your exam.  Do not smoke, chew tobacco, or swallow anything, including water or gum for at least 2 hours before your arrival time. This is a safety issue. Your procedure could be cancelled if you do not follow directions.  Please do not wear jewelry (i.e. earrings, rings, necklaces, watches, etc) . Leave your purse, billfold, credit cards, and other valuables at home.    Please arrive with a responsible adult who can take you home after the test and stay with you for a minimum of 24 hours: The medicine used will make you sleepy and forgetful. If you do not have someone to take you home, we will cancel your procedure. If using public transportation you must have someone to ride with you.  Please perform your nebulizer treatments and airway clearance therapy in the morning prior to the procedure (if applicable).  If you have asthma, bring your inhalers.       CLEAR LIQUID DIET   You may have:    Water, tea, coffee (no milk or cream)  Soda pop, Gatorade (not red or purple)  Jell-O, Popsicles (no milk or fruit pieces - not red or purple)  Fat-free soup broth or bouillon  Plain hard candy, such as clear life savers (not red or purple)  Clear juices and fruit-flavored drinks, such as apple juice, white grape juice, Hi-C, and Homar-Aid (not red or purple)   Do not have:    Milk or milk products such as ice cream, malts or shakes, or coffee creamer  Red or purple drinks of any kind such as  cranberry juice or grape juice. Avoid red or purple Jell-O, Popsicles, Homar-Aid, sorbet, sherbet and candy  Juices with pulp such as orange, grapefruit, pineapple or tomato juice  Cream soups of any kind  Alcohol and beer  Protein drinks or protein powder     LOW FIBER DIET   You may have:    Starches: White bread, rolls, biscuits, croissants, Platina toast, white flour tortillas, waffles, pancakes, Sami toast; white rice, noodles, pasta, macaroni; cooked and peeled potatoes; plain crackers, saltines; cooked farina or cream of rice; puffed rice, corn flakes, Rice Krispies, Special K   Vegetables: tender cooked and canned, vegetable broths  Fruits and fruit juices: Strained fruit juice, canned fruit without seeds or skin (not pineapple), applesauce, pear sauce, ripe bananas, melons (not watermelon)   Milk products: Milk (plain or flavored), cheese, cottage cheese, yogurt (no berries), custard, ice cream    Proteins: Tender, well-cooked ground beef, lamb, veal, ham, pork, chicken, turkey, fish or organ meats; eggs; creamy peanut butter   Fats and condiments:  Margarine, butter, oils, mayonnaise, sour cream, salad dressing, plain gravy; spices, cooked herbs; sugar, clear jelly, honey, syrup   Snacks, sweets and drinks: Pretzels, hard candy; plain cakes and cookies (no nuts or seeds); gelatin, plain pudding, sherbet, Popsicles; coffee, tea, carbonated ( fizzy ) drinks Do not have:    Starches: Breads or rolls that contain nuts, seeds or fruit; whole wheat or whole grain breads that contain more than 1 gram of fiber per slice; cornbread; corn or whole wheat tortillas; potatoes with skin; brown rice, wild rice, kasha (buckwheat), and oatmeal   Vegetables: Any raw or steamed vegetables; vegetables with seeds; corn in any form   Fruits and fruit juices: Prunes, prune juice, raisins and other dried fruits, berries and other fruits with seeds, canned pineapple juices with pulp such as orange, grapefruit, pineapple or tomato  juice  Milk products: Any yogurt with nuts, seeds or berries   Proteins: Tough, fibrous meats with gristle; cooked dried beans, peas or lentils; crunchy peanut butter  Fats and condiments: Pickles, olives, relish, horseradish; jam, marmalade, preserves   Snacks, sweets and drinks: Popcorn, nuts, seeds, granola, coconut, candies made with nuts or seeds; all desserts that contain nuts, seeds, raisins and other dried fruits, coconut, whole grains or bran.      FAQ:    Why should Miralax be mixed with Gatorade or another clear sports drink?   It is important that your body does not develop an imbalance of electrolytes with the large volume of fluid in this prep. Gatorade/sports drinks contains those electrolytes.   Does Miralax have to be mixed with Gatorade?  Gatorade, Powerade, or any of the other sports drinks are acceptable. If you are diabetic, it is acceptable to use the low sugar options, such as Gatorade Zero, Powerade Zero, G2, etc.  Can I put ice in the Gatorade/Miralax mixture?  We prefer that you mix it and put it in the refrigerator to chill instead of using ice. The ice will melt and dilute the laxative.    Why should the Miralax prep be taken in several steps?   The stool is flushed out by a large wave of fluid going through the colon. Just sipping a large volume of the solution will not achieve the desired result. Studies have shown that two smaller waves (or more in some cases) are better than one large one.    Why are the second Miralax dose and Magnesium Citrate so close to the exam?   The intestine continues to produce mucus and waste. Longer intervals between the prep and the exam can lead to less than desired results. However, the stomach must be empty at the time of the exam in order to allow safe sedation. Therefore, there should be nothing by mouth 2 hours before the exam is started.   How do you know if your colon is cleaned out?   After completing the bowel prep, your bowel movements should be  all liquid and yellow. Your bowel movements will look similar to urine in the toilet. If there are pieces of stool (poop) in the toilet, or if you can't see to the bottom of the toilet, please call our office for advice. Call [phone] and ask to speak with a nurse.   Why do you need a responsible  to take you home and stay with you?  We require a responsible adult to take you home for your safety. The sedation medicines used to relax you during the procedure can impair your judgement and reaction time, and make you forgetful and possibly a little unsteady. Do not drive, make any important decisions, or sign any legal documents for 24 hours after your procedure.   It is normal to feel bloated and gassy after your procedure. Walking will help move the air through your colon. You can take non-aspirin pain relievers that contain acetaminophen (Tylenol).     When can you eat after your procedure?  You may resume your normal diet when you feel ready, unless advised otherwise by the doctor performing your procedure. Do not drink alcohol for 24 hours after your procedure.   You many resume normal activities (work, exercise, etc.) after 24 hours.     When will you get test results?  You should have your procedure results and any lab results (if applicable) by letter, MyChart message, or phone call within 2 weeks. If you have any questions, please call the doctor that referred you for the procedure.         Updated: 06/22/2022

## 2024-04-19 NOTE — TELEPHONE ENCOUNTER
Attempted to contact patient in order to complete pre assessment questions.     No answer. Left message to return call to [phone] option 4    Callback required communication sent via letter.      Cristy Masterson RN  Endoscopy Procedure Pre Assessment RN

## 2024-04-19 NOTE — TELEPHONE ENCOUNTER
Pre visit planning completed.      Procedure details:    Patient scheduled for Colonoscopy  on 5/1/24.     Arrival time: 1315. Procedure time 1400    Facility location: [facility]; [address] [place] [address]. Check in location: Main entrance at . Come through the roundabout underneath the awning (not the ER entrance).    Sedation type: Conscious sedation     Pre op exam needed? N/A    Indication for procedure: hx of polyps      Chart review:     Electronic implanted devices? No    Recent diagnosis of diverticulitis within the last 6 weeks? No    Diabetic? No      Medication review:    Anticoagulants? No    NSAIDS? No    Other medication HOLDING recommendations:  N/A      Prep for procedure:     Bowel prep recommendation: Standard Miralax  Due to: standard bowel prep.    Prep instructions sent via letter (4/18/24)         Cristy Harrison RN  Endoscopy Procedure Pre Assessment RN  [phone] option 4

## 2024-04-22 NOTE — TELEPHONE ENCOUNTER
Pre assessment completed for upcoming procedure.   (Please see previous telephone encounter notes for complete details)    Procedure details:    Arrival time and facility location reviewed.    Pre op exam needed? N/A    Designated  policy reviewed. Instructed to have someone stay 6 hours post procedure.     COVID policy reviewed.      Medication review:    Medications reviewed. Please see supporting documentation below. Holding recommendations discussed (if applicable).       Prep for procedure:     Procedure prep instructions reviewed.        Additional information needed?  N/A      Patient  verbalized understanding and had no questions or concerns at this time.      Corinne Kliber, RN  Endoscopy Procedure Pre Assessment RN  [phone] option 4

## 2024-05-01 ENCOUNTER — HOSPITAL ENCOUNTER (OUTPATIENT)
Facility: CLINIC | Age: 59
Discharge: HOME | End: 2024-05-01
Attending: COLON & RECTAL SURGERY | Admitting: COLON & RECTAL SURGERY
Payer: COMMERCIAL

## 2024-05-01 VITALS
RESPIRATION RATE: 16 BRPM | BODY MASS INDEX: 23.23 KG/M2 | TEMPERATURE: 98.5 F | DIASTOLIC BLOOD PRESSURE: 74 MMHG | WEIGHT: 148 LBS | SYSTOLIC BLOOD PRESSURE: 112 MMHG | HEART RATE: 76 BPM | HEIGHT: 67 IN | OXYGEN SATURATION: 97 %

## 2024-05-01 LAB — COLONOSCOPY: NORMAL

## 2024-05-01 PROCEDURE — 250N000011 HC RX IP 250 OP 636: Performed by: COLON & RECTAL SURGERY

## 2024-05-01 PROCEDURE — 45380 COLONOSCOPY AND BIOPSY: CPT | Performed by: COLON & RECTAL SURGERY

## 2024-05-01 PROCEDURE — 88305 TISSUE EXAM BY PATHOLOGIST: CPT | Mod: 26 | Performed by: PATHOLOGY

## 2024-05-01 PROCEDURE — G0500 MOD SEDAT ENDO SERVICE >5YRS: HCPCS | Mod: PT | Performed by: COLON & RECTAL SURGERY

## 2024-05-01 PROCEDURE — 250N000013 HC RX MED GY IP 250 OP 250 PS 637: Performed by: COLON & RECTAL SURGERY

## 2024-05-01 PROCEDURE — 45385 COLONOSCOPY W/LESION REMOVAL: CPT | Performed by: COLON & RECTAL SURGERY

## 2024-05-01 PROCEDURE — 99153 MOD SED SAME PHYS/QHP EA: CPT | Performed by: COLON & RECTAL SURGERY

## 2024-05-01 PROCEDURE — 88305 TISSUE EXAM BY PATHOLOGIST: CPT | Mod: TC | Performed by: COLON & RECTAL SURGERY

## 2024-05-01 RX ORDER — FLUMAZENIL 0.1 MG/ML
0.2 INJECTION, SOLUTION INTRAVENOUS
Status: DISCONTINUED | OUTPATIENT
Start: 2024-05-01 | End: 2024-05-01 | Stop reason: HOSPADM

## 2024-05-01 RX ORDER — ATROPINE SULFATE 0.1 MG/ML
1 INJECTION INTRAVENOUS
Status: DISCONTINUED | OUTPATIENT
Start: 2024-05-01 | End: 2024-05-01 | Stop reason: HOSPADM

## 2024-05-01 RX ORDER — FENTANYL CITRATE 50 UG/ML
50-100 INJECTION, SOLUTION INTRAMUSCULAR; INTRAVENOUS EVERY 5 MIN PRN
Status: DISCONTINUED | OUTPATIENT
Start: 2024-05-01 | End: 2024-05-01 | Stop reason: HOSPADM

## 2024-05-01 RX ORDER — DIPHENHYDRAMINE HYDROCHLORIDE 50 MG/ML
25-50 INJECTION INTRAMUSCULAR; INTRAVENOUS
Status: DISCONTINUED | OUTPATIENT
Start: 2024-05-01 | End: 2024-05-01 | Stop reason: HOSPADM

## 2024-05-01 RX ORDER — PROCHLORPERAZINE MALEATE 10 MG
10 TABLET ORAL EVERY 6 HOURS PRN
Status: DISCONTINUED | OUTPATIENT
Start: 2024-05-01 | End: 2024-05-01 | Stop reason: HOSPADM

## 2024-05-01 RX ORDER — ONDANSETRON 2 MG/ML
4 INJECTION INTRAMUSCULAR; INTRAVENOUS
Status: DISCONTINUED | OUTPATIENT
Start: 2024-05-01 | End: 2024-05-01 | Stop reason: HOSPADM

## 2024-05-01 RX ORDER — EPINEPHRINE 1 MG/ML
0.1 INJECTION, SOLUTION INTRAMUSCULAR; SUBCUTANEOUS
Status: DISCONTINUED | OUTPATIENT
Start: 2024-05-01 | End: 2024-05-01 | Stop reason: HOSPADM

## 2024-05-01 RX ORDER — NALOXONE HYDROCHLORIDE 0.4 MG/ML
0.4 INJECTION, SOLUTION INTRAMUSCULAR; INTRAVENOUS; SUBCUTANEOUS
Status: DISCONTINUED | OUTPATIENT
Start: 2024-05-01 | End: 2024-05-01 | Stop reason: HOSPADM

## 2024-05-01 RX ORDER — SIMETHICONE 40MG/0.6ML
133 SUSPENSION, DROPS(FINAL DOSAGE FORM)(ML) ORAL
Status: COMPLETED | OUTPATIENT
Start: 2024-05-01 | End: 2024-05-01

## 2024-05-01 RX ORDER — ONDANSETRON 2 MG/ML
4 INJECTION INTRAMUSCULAR; INTRAVENOUS EVERY 6 HOURS PRN
Status: DISCONTINUED | OUTPATIENT
Start: 2024-05-01 | End: 2024-05-01 | Stop reason: HOSPADM

## 2024-05-01 RX ORDER — NALOXONE HYDROCHLORIDE 0.4 MG/ML
0.2 INJECTION, SOLUTION INTRAMUSCULAR; INTRAVENOUS; SUBCUTANEOUS
Status: DISCONTINUED | OUTPATIENT
Start: 2024-05-01 | End: 2024-05-01 | Stop reason: HOSPADM

## 2024-05-01 RX ORDER — ONDANSETRON 4 MG/1
4 TABLET, ORALLY DISINTEGRATING ORAL EVERY 6 HOURS PRN
Status: DISCONTINUED | OUTPATIENT
Start: 2024-05-01 | End: 2024-05-01 | Stop reason: HOSPADM

## 2024-05-01 RX ORDER — LIDOCAINE 40 MG/G
CREAM TOPICAL
Status: DISCONTINUED | OUTPATIENT
Start: 2024-05-01 | End: 2024-05-01 | Stop reason: HOSPADM

## 2024-05-01 RX ADMIN — FENTANYL CITRATE 50 MCG: 50 INJECTION, SOLUTION INTRAMUSCULAR; INTRAVENOUS at 11:21

## 2024-05-01 RX ADMIN — MIDAZOLAM 2 MG: 1 INJECTION INTRAMUSCULAR; INTRAVENOUS at 11:13

## 2024-05-01 RX ADMIN — SIMETHICONE 133 MG: 20 SUSPENSION/ DROPS ORAL at 11:38

## 2024-05-01 RX ADMIN — FENTANYL CITRATE 100 MCG: 50 INJECTION, SOLUTION INTRAMUSCULAR; INTRAVENOUS at 11:13

## 2024-05-01 ASSESSMENT — ACTIVITIES OF DAILY LIVING (ADL)
ADLS_ACUITY_SCORE: 35

## 2024-05-01 NOTE — H&P
Pre-Endoscopy History and Physical     Kari Christian MRN# [id number]   YOB: 1965 Age: 58 year old     Date of Procedure: 5/1/2024  Primary care provider: Blessing Ling  Type of Endoscopy: colonoscopy  Reason for Procedure: surveillance  Type of Anesthesia Anticipated: Moderate Sedation    HPI:    Kari is a 58 year old female who will be undergoing the above procedure.      A history and physical has been performed. The patient's medications and allergies have been reviewed. The risks and benefits of the procedure and the sedation options and risks were discussed with the patient.  All questions were answered and informed consent was obtained.      She denies a personal or family history of anesthesia complications or bleeding disorders.     Allergies   Allergen Reactions    Cephalexin Itching    Lexapro [Escitalopram]      dizzy    Paxil [Paroxetine]      Dizzy      Sulfa Antibiotics     Wellbutrin [Bupropion]      Dizzy          Prior to Admission Medications   Prescriptions Last Dose Informant Patient Reported? Taking?   FOLIC ACID PO   Yes No   Sig: Take 1 mg by mouth as needed    albuterol (PROAIR HFA/PROVENTIL HFA/VENTOLIN HFA) 108 (90 Base) MCG/ACT inhaler   No No   Sig: Inhale 2 puffs into the lungs every 6 hours as needed for shortness of breath   budesonide-formoterol (SYMBICORT) 160-4.5 MCG/ACT Inhaler   No No   Sig: Inhale 2 puffs into the lungs 2 times daily   sertraline (ZOLOFT) 25 MG tablet 4/30/2024  No Yes   Sig: TAKE 1 TABLET BY MOUTH EVERY DAY   vitamin D3 (CHOLECALCIFEROL) 50 mcg (2000 units) tablet   No No   Sig: Take 2 tablets (100 mcg) by mouth daily      Facility-Administered Medications: None       Patient Active Problem List   Diagnosis    Hereditary spherocytosis (H24)    Major depressive disorder, single episode, mild (H24)    CARDIOVASCULAR SCREENING; LDL GOAL LESS THAN 160    Anxiety    Vitamin D deficiency    Irritable bowel syndrome with diarrhea     Hyperthyroidism    Tobacco abuse    Intermittent asthma    Labyrinthitis of left ear    Family history of ischemic heart disease    Deafness, left    Mild persistent asthma, unspecified whether complicated    Other fatigue    H/O LEEP        Past Medical History:   Diagnosis Date    Anemia, unspecified     normal is 7-10    Hereditary spherocytosis (H24)     no past transfusion;     Major depressive disorder, single episode, mild (H24)     Thyroid disease         Past Surgical History:   Procedure Laterality Date    CHOLECYSTECTOMY  2007    COLONOSCOPY  09/2014    [facility]    ESOPHAGOSCOPY, GASTROSCOPY, DUODENOSCOPY (EGD), COMBINED N/A 01/15/2018    Procedure: COMBINED ESOPHAGOSCOPY, GASTROSCOPY, DUODENOSCOPY (EGD), BIOPSY SINGLE OR MULTIPLE;  ESOPHAGOSCOPY, GASTROSCOPY, DUODENOSCOPY (EGD) with cold biopsies of duodenum and  COLONOSCOPY with polypectomy;  Surgeon: Chad Wade MD;  Location:  GI    HEAD & NECK SURGERY      wisdom teeth removed    LEEP TX, CERVICAL      Unknow date and results.       Social History     Tobacco Use    Smoking status: Every Day     Current packs/day: 0.50     Average packs/day: 0.5 packs/day for 40.0 years (20.0 ttl pk-yrs)     Types: Cigarettes    Smokeless tobacco: Never    Tobacco comments:     current smoker  40 years at 2022   Substance Use Topics    Alcohol use: Not Currently     Alcohol/week: 2.0 - 3.0 standard drinks of alcohol     Types: 2 - 3 Standard drinks or equivalent per week       Family History   Problem Relation Age of Onset    Hypertension Mother     Arthritis Mother     Respiratory Mother         Emphysema    Gastrointestinal Disease Mother     Circulatory Mother     Neuropathy Mother     C.A.D. Father 59    Hypertension Father     Cancer Father         Esophageal    Gastrointestinal Disease Father         Gallstones, Gallbladder removal, Colostomy, diverticulitis    Cancer Maternal Grandfather         Kidney    Cancer  "Paternal Grandfather         Lung, smoker    Breast Cancer Sister     Thyroid Disease Sister     Cancer - colorectal No family hx of     Colon Cancer No family hx of        REVIEW OF SYSTEMS:     5 point ROS negative except as noted above in HPI, including Gen., Resp., CV, GI &  system review.      PHYSICAL EXAM:   Ht 1.702 m (5' 7\")   Wt 67.1 kg (148 lb)   LMP 04/28/2009   BMI 23.18 kg/m   Estimated body mass index is 23.18 kg/m  as calculated from the following:    Height as of this encounter: 1.702 m (5' 7\").    Weight as of this encounter: 67.1 kg (148 lb).   GENERAL APPEARANCE: healthy and alert  MENTAL STATUS: alert  AIRWAY EXAM: Mallampatti Class II (visualization of the soft palate, fauces, and uvula)  RESP: lungs clear to auscultation - no rales, rhonchi or wheezes  CV: regular rates and rhythm      DIAGNOSTICS:    Not indicated      IMPRESSION   ASA Class 2 - Mild systemic disease        PLAN:       Plan for colonoscopy. We discussed the risks, benefits and alternatives and the patient wished to proceed.    The above has been forwarded to the consulting provider.      Signed Electronically by: Ciara Olivarez MD  May 1, 2024    "

## 2024-05-02 LAB
PATH REPORT.COMMENTS IMP SPEC: NORMAL
PATH REPORT.COMMENTS IMP SPEC: NORMAL
PATH REPORT.FINAL DX SPEC: NORMAL
PATH REPORT.GROSS SPEC: NORMAL
PATH REPORT.MICROSCOPIC SPEC OTHER STN: NORMAL
PATH REPORT.RELEVANT HX SPEC: NORMAL
PHOTO IMAGE: NORMAL

## 2024-05-30 ENCOUNTER — PATIENT OUTREACH (OUTPATIENT)
Dept: CARE COORDINATION | Facility: CLINIC | Age: 59
End: 2024-05-30
Payer: COMMERCIAL

## 2024-06-03 ENCOUNTER — TELEPHONE (OUTPATIENT)
Dept: NEUROSURGERY | Facility: CLINIC | Age: 59
End: 2024-06-03

## 2024-06-03 NOTE — TELEPHONE ENCOUNTER
Called patient to reschedule appointment w/ Dr. Abreu, but she's having insurance issues and cancelled appointment w/ Dr. Abreu. -KB

## 2024-06-06 ENCOUNTER — TELEPHONE (OUTPATIENT)
Dept: NEUROSURGERY | Facility: CLINIC | Age: 59
End: 2024-06-06

## 2024-06-06 NOTE — TELEPHONE ENCOUNTER
Patient called requsting to cancel IR procedure due to insurance. Will call back when she is ready to rescheduled     Carlos Manuel Rai on 6/6/2024 at 10:41 AM

## 2024-06-13 ENCOUNTER — PATIENT OUTREACH (OUTPATIENT)
Dept: CARE COORDINATION | Facility: CLINIC | Age: 59
End: 2024-06-13

## 2024-09-07 NOTE — TELEPHONE ENCOUNTER
Reason for Call:  Other letter    Detailed comments: Pt is wondering if the Dr will fill out a form for approval of a standing work station or if she needs an appt.  Pt was told to drop off or mail in the form for the Dr to look at.  Pls be watching for form.  If pt needs an appt pls call pt.    Phone Number Patient can be reached at: Home number on file [phone] (home)    Best Time: any    Can we leave a detailed message on this number? YES    Call taken on 7/11/2018 at 1:21 PM by Berta Henson       No

## 2024-09-24 ENCOUNTER — OFFICE VISIT (OUTPATIENT)
Dept: INTERNAL MEDICINE | Facility: CLINIC | Age: 59
End: 2024-09-24
Payer: COMMERCIAL

## 2024-09-24 VITALS
WEIGHT: 147 LBS | BODY MASS INDEX: 23.63 KG/M2 | DIASTOLIC BLOOD PRESSURE: 70 MMHG | OXYGEN SATURATION: 93 % | HEART RATE: 104 BPM | HEIGHT: 66 IN | SYSTOLIC BLOOD PRESSURE: 110 MMHG | TEMPERATURE: 98.8 F | RESPIRATION RATE: 16 BRPM

## 2024-09-24 DIAGNOSIS — R93.89 ABNORMAL MRI: ICD-10-CM

## 2024-09-24 DIAGNOSIS — R19.7 DIARRHEA, UNSPECIFIED TYPE: ICD-10-CM

## 2024-09-24 DIAGNOSIS — Z00.00 ROUTINE GENERAL MEDICAL EXAMINATION AT A HEALTH CARE FACILITY: Primary | ICD-10-CM

## 2024-09-24 DIAGNOSIS — Z23 FLU VACCINE NEED: ICD-10-CM

## 2024-09-24 DIAGNOSIS — E55.9 VITAMIN D DEFICIENCY: ICD-10-CM

## 2024-09-24 DIAGNOSIS — Z12.31 ENCOUNTER FOR SCREENING MAMMOGRAM FOR BREAST CANCER: ICD-10-CM

## 2024-09-24 DIAGNOSIS — F33.0 MILD EPISODE OF RECURRENT MAJOR DEPRESSIVE DISORDER (H): ICD-10-CM

## 2024-09-24 DIAGNOSIS — J45.30 MILD PERSISTENT ASTHMA, UNSPECIFIED WHETHER COMPLICATED: ICD-10-CM

## 2024-09-24 LAB
BASOPHILS # BLD AUTO: 0 10E3/UL (ref 0–0.2)
BASOPHILS NFR BLD AUTO: 1 %
EOSINOPHIL # BLD AUTO: 0.2 10E3/UL (ref 0–0.7)
EOSINOPHIL NFR BLD AUTO: 4 %
ERYTHROCYTE [DISTWIDTH] IN BLOOD BY AUTOMATED COUNT: 19.6 % (ref 10–15)
HCT VFR BLD AUTO: 26.3 % (ref 35–47)
HGB BLD-MCNC: 9.3 G/DL (ref 11.7–15.7)
IMM GRANULOCYTES # BLD: 0 10E3/UL
IMM GRANULOCYTES NFR BLD: 0 %
LYMPHOCYTES # BLD AUTO: 1.5 10E3/UL (ref 0.8–5.3)
LYMPHOCYTES NFR BLD AUTO: 30 %
MCH RBC QN AUTO: 30.6 PG (ref 26.5–33)
MCHC RBC AUTO-ENTMCNC: 35.4 G/DL (ref 31.5–36.5)
MCV RBC AUTO: 87 FL (ref 78–100)
MONOCYTES # BLD AUTO: 0.4 10E3/UL (ref 0–1.3)
MONOCYTES NFR BLD AUTO: 7 %
NEUTROPHILS # BLD AUTO: 3 10E3/UL (ref 1.6–8.3)
NEUTROPHILS NFR BLD AUTO: 58 %
PLATELET # BLD AUTO: 194 10E3/UL (ref 150–450)
RBC # BLD AUTO: 3.04 10E6/UL (ref 3.8–5.2)
WBC # BLD AUTO: 5.2 10E3/UL (ref 4–11)

## 2024-09-24 PROCEDURE — 84443 ASSAY THYROID STIM HORMONE: CPT | Performed by: NURSE PRACTITIONER

## 2024-09-24 PROCEDURE — 85025 COMPLETE CBC W/AUTO DIFF WBC: CPT | Performed by: NURSE PRACTITIONER

## 2024-09-24 PROCEDURE — 84439 ASSAY OF FREE THYROXINE: CPT | Performed by: NURSE PRACTITIONER

## 2024-09-24 PROCEDURE — 99396 PREV VISIT EST AGE 40-64: CPT | Mod: 25 | Performed by: NURSE PRACTITIONER

## 2024-09-24 PROCEDURE — 80053 COMPREHEN METABOLIC PANEL: CPT | Performed by: NURSE PRACTITIONER

## 2024-09-24 PROCEDURE — 96127 BRIEF EMOTIONAL/BEHAV ASSMT: CPT | Performed by: NURSE PRACTITIONER

## 2024-09-24 PROCEDURE — 99214 OFFICE O/P EST MOD 30 MIN: CPT | Mod: 25 | Performed by: NURSE PRACTITIONER

## 2024-09-24 PROCEDURE — 90673 RIV3 VACCINE NO PRESERV IM: CPT | Performed by: NURSE PRACTITIONER

## 2024-09-24 PROCEDURE — 36415 COLL VENOUS BLD VENIPUNCTURE: CPT | Performed by: NURSE PRACTITIONER

## 2024-09-24 PROCEDURE — 80061 LIPID PANEL: CPT | Performed by: NURSE PRACTITIONER

## 2024-09-24 PROCEDURE — 90471 IMMUNIZATION ADMIN: CPT | Performed by: NURSE PRACTITIONER

## 2024-09-24 RX ORDER — BUDESONIDE AND FORMOTEROL FUMARATE DIHYDRATE 160; 4.5 UG/1; UG/1
2 AEROSOL RESPIRATORY (INHALATION) 2 TIMES DAILY
Qty: 10.2 G | Refills: 11 | Status: SHIPPED | OUTPATIENT
Start: 2024-09-24

## 2024-09-24 RX ORDER — ALBUTEROL SULFATE 90 UG/1
2 AEROSOL, METERED RESPIRATORY (INHALATION) EVERY 6 HOURS PRN
Qty: 18 G | Refills: 11 | Status: SHIPPED | OUTPATIENT
Start: 2024-09-24

## 2024-09-24 ASSESSMENT — PATIENT HEALTH QUESTIONNAIRE - PHQ9
SUM OF ALL RESPONSES TO PHQ QUESTIONS 1-9: 6
SUM OF ALL RESPONSES TO PHQ QUESTIONS 1-9: 6
10. IF YOU CHECKED OFF ANY PROBLEMS, HOW DIFFICULT HAVE THESE PROBLEMS MADE IT FOR YOU TO DO YOUR WORK, TAKE CARE OF THINGS AT HOME, OR GET ALONG WITH OTHER PEOPLE: SOMEWHAT DIFFICULT

## 2024-09-24 ASSESSMENT — ASTHMA QUESTIONNAIRES
QUESTION_3 LAST FOUR WEEKS HOW OFTEN DID YOUR ASTHMA SYMPTOMS (WHEEZING, COUGHING, SHORTNESS OF BREATH, CHEST TIGHTNESS OR PAIN) WAKE YOU UP AT NIGHT OR EARLIER THAN USUAL IN THE MORNING: ONCE OR TWICE
ACT_TOTALSCORE: 20
QUESTION_4 LAST FOUR WEEKS HOW OFTEN HAVE YOU USED YOUR RESCUE INHALER OR NEBULIZER MEDICATION (SUCH AS ALBUTEROL): ONCE A WEEK OR LESS
QUESTION_5 LAST FOUR WEEKS HOW WOULD YOU RATE YOUR ASTHMA CONTROL: WELL CONTROLLED
QUESTION_2 LAST FOUR WEEKS HOW OFTEN HAVE YOU HAD SHORTNESS OF BREATH: ONCE OR TWICE A WEEK
QUESTION_1 LAST FOUR WEEKS HOW MUCH OF THE TIME DID YOUR ASTHMA KEEP YOU FROM GETTING AS MUCH DONE AT WORK, SCHOOL OR AT HOME: A LITTLE OF THE TIME
ACT_TOTALSCORE: 20

## 2024-09-24 NOTE — PATIENT INSTRUCTIONS
Lab in [address]  Stool test     Zoloft / sertraline 50 mg daily to hyvee     Inhaler to yolie    Gastroenterology referral     Take fiber supplement daily - metamucil     Pancreas MRI

## 2024-09-24 NOTE — PROGRESS NOTES
Preventive Care Visit  [facility]  BILLIE Monson CNP, Internal Medicine  Sep 24, 2024      Assessment & Plan     Routine general medical examination at a health care facility    - Lipid panel reflex to direct LDL Fasting; Future  - Comprehensive metabolic panel (BMP + Alb, Alk Phos, ALT, AST, Total. Bili, TP); Future  - TSH with free T4 reflex; Future  - CBC with platelets and differential; Future  - INFLUENZA VACCINE IM 18-64 YRS (FLUBLOK)  - Lipid panel reflex to direct LDL Fasting  - Comprehensive metabolic panel (BMP + Alb, Alk Phos, ALT, AST, Total. Bili, TP)  - TSH with free T4 reflex  - CBC with platelets and differential    Diarrhea, unspecified type  Has been more bothersome   Normal colonoscopy   - Enteric Bacteria and Virus Panel by LIANA Stool; Future  - Ova and Parasite Exam Routine; Future  - C. difficile Toxin B PCR with reflex to C. difficile Antigen and Toxins A/B EIA; Future  - Adult GI  Referral - Consult Only; Future  - Enteric Bacteria and Virus Panel by LIANA Stool  - Ova and Parasite Exam Routine  - C. difficile Toxin B PCR with reflex to C. difficile Antigen and Toxins A/B EIA    Mild persistent asthma, unspecified whether complicated  Stable   - albuterol (PROAIR HFA/PROVENTIL HFA/VENTOLIN HFA) 108 (90 Base) MCG/ACT inhaler; Inhale 2 puffs into the lungs every 6 hours as needed for shortness of breath. ### DO NOT FILL NOW.  Please update patient's profile to reflect additional refills.  ####  - budesonide-formoterol (SYMBICORT) 160-4.5 MCG/ACT Inhaler; Inhale 2 puffs into the lungs 2 times daily.    Vitamin D deficiency  Stable     Mild episode of recurrent major depressive disorder (H24)  Want to increase dose - doing well on medication   Has made significant difference in mood issues    - sertraline (ZOLOFT) 50 MG tablet; Take 1 tablet (50 mg) by mouth daily.    Flu vaccine need    - INFLUENZA VACCINE IM 18-64 YRS (FLUBLOK)    Encounter for  screening mammogram for breast cancer    - MA Screen Bilateral w/Harry; Future    Abnormal MRI  Cyst - want to follow up    - MR Pancreas wo & w Contrast; Future            Nicotine/Tobacco Cessation  She reports that she has been smoking cigarettes. She has a 20 pack-year smoking history. She has never used smokeless tobacco.  Nicotine/Tobacco Cessation Plan  Information offered: Patient not interested at this time      Counseling  Appropriate preventive services were addressed with this patient via screening, questionnaire, or discussion as appropriate for fall prevention, nutrition, physical activity, Tobacco-use cessation, social engagement, weight loss and cognition.  Checklist reviewing preventive services available has been given to the patient.  Reviewed patient's diet, addressing concerns and/or questions.   The patient's PHQ-9 score is consistent with mild depression. She was provided with information regarding depression.       Patient Instructions   Lab in [address]  Stool test     Zoloft / sertraline 50 mg daily to hyvee     Inhaler to yolie    Gastroenterology referral     Take fiber supplement daily - metamucil     Pancreas MRI                  Alfie Pierce is a 58 year old, presenting for the following:  Physical        9/24/2024     8:53 AM   Additional Questions   Roomed by Ju SORIA CMA        Health Care Directive  Patient does not have a Health Care Directive or Living Will: Discussed advance care planning with patient; information given to patient to review.    HPI  Fasting     Colon done 2024  Polyp   Repeat 5 years     Diarrhea frequently   Lactose and fried food goes through her   Watery stool in am and mushy stool  at times   Rare formed   Takes fiber supplement daily prn- metamucil       9/24/2024   General Health   How would you rate your overall physical health? (!) FAIR   Feel stress (tense, anxious, or unable to sleep) To some extent      (!) STRESS CONCERN      9/24/2024    Nutrition   Three or more servings of calcium each day? (!) NO   Diet: I don't know   How many servings of fruit and vegetables per day? (!) 0-1   How many sweetened beverages each day? (!) 2            9/24/2024   Exercise   Days per week of moderate/strenous exercise 4 days            9/24/2024   Social Factors   Frequency of gathering with friends or relatives Once a week   Worry food won't last until get money to buy more No   Food not last or not have enough money for food? No   Do you have housing? (Housing is defined as stable permanent housing and does not include staying ouside in a car, in a tent, in an abandoned building, in an overnight shelter, or couch-surfing.) Yes   Are you worried about losing your housing? No   Lack of transportation? No   Unable to get utilities (heat,electricity)? No            9/24/2024   Fall Risk   Fallen 2 or more times in the past year? No   Trouble with walking or balance? No             9/24/2024   Dental   Dentist two times every year? Yes            9/24/2024   TB Screening   Were you born outside of the [place]? No          Today's PHQ-9 Score:       9/24/2024     8:39 AM   PHQ-9 SCORE   PHQ-9 Total Score MyChart 6 (Mild depression)   PHQ-9 Total Score 6         9/24/2024   Substance Use   Alcohol more than 3/day or more than 7/wk Not Applicable   Do you use any other substances recreationally? No        Social History     Tobacco Use    Smoking status: Every Day     Current packs/day: 0.50     Average packs/day: 0.5 packs/day for 40.0 years (20.0 ttl pk-yrs)     Types: Cigarettes    Smokeless tobacco: Never    Tobacco comments:     current smoker  40 years at 2022   Vaping Use    Vaping status: Never Used   Substance Use Topics    Alcohol use: Not Currently     Alcohol/week: 2.0 - 3.0 standard drinks of alcohol     Types: 2 - 3 Standard drinks or equivalent per week    Drug use: No           11/28/2023   LAST FHS-7 RESULTS   1st degree relative breast or ovarian cancer  "Yes   Any relative bilateral breast cancer No   Any male have breast cancer No   Any ONE woman have BOTH breast AND ovarian cancer No   Any woman with breast cancer before 50yrs Yes   2 or more relatives with breast AND/OR ovarian cancer No   2 or more relatives with breast AND/OR bowel cancer No                   9/24/2024   STI Screening   New sexual partner(s) since last STI/HIV test? No        History of abnormal Pap smear:         Latest Ref Rng & Units 5/27/2022     1:57 PM   PAP / HPV   PAP  Negative for Intraepithelial Lesion or Malignancy (NILM)    HPV 16 DNA Negative Negative    HPV 18 DNA Negative Negative    Other HR HPV Negative Negative      ASCVD Risk   The 10-year ASCVD risk score (Marco A MARTE, et al., 2019) is: 4.2%    Values used to calculate the score:      Age: 58 years      Sex: Female      Is Non- : No      Diabetic: No      Tobacco smoker: Yes      Systolic Blood Pressure: 110 mmHg      Is BP treated: No      HDL Cholesterol: 57 mg/dL      Total Cholesterol: 158 mg/dL           Reviewed and updated as needed this visit by Provider                    Lab work is in process      Review of Systems  Constitutional, neuro, ENT, endocrine, pulmonary, cardiac, gastrointestinal, genitourinary, musculoskeletal, integument and psychiatric systems are negative, except as otherwise noted.     Objective    Exam  /70 (BP Location: Left arm, Patient Position: Sitting, Cuff Size: Adult Large)   Pulse 104   Temp 98.8  F (37.1  C) (Oral)   Resp 16   Ht 1.68 m (5' 6.14\")   Wt 66.7 kg (147 lb)   LMP 04/28/2009 (Approximate)   SpO2 93%   Breastfeeding No   BMI 23.62 kg/m     Estimated body mass index is 23.62 kg/m  as calculated from the following:    Height as of this encounter: 1.68 m (5' 6.14\").    Weight as of this encounter: 66.7 kg (147 lb).    Physical Exam  GENERAL: alert and no distress  EYES: Eyes grossly normal to inspection, PERRL and conjunctivae and " sclerae normal  HENT: ear canals and TM's normal, nose and mouth without ulcers or lesions  NECK: no adenopathy, no asymmetry, masses, or scars  RESP: lungs clear to auscultation - no rales, rhonchi or wheezes  BREAST: normal without masses, tenderness or nipple discharge and no palpable axillary masses or adenopathy  CV: regular rate and rhythm, normal S1 S2, no S3 or S4, no murmur, click or rub, no peripheral edema  ABDOMEN: soft, nontender, no hepatosplenomegaly, no masses and bowel sounds normal  MS: no gross musculoskeletal defects noted, no edema  SKIN: no suspicious lesions or rashes  NEURO: Normal strength and tone, mentation intact and speech normal  PSYCH: mentation appears normal, affect normal/bright        Signed Electronically by: BILLIE Monson CNP    Answers submitted by the patient for this visit:  Patient Health Questionnaire (Submitted on 9/24/2024)  If you checked off any problems, how difficult have these problems made it for you to do your work, take care of things at home, or get along with other people?: Somewhat difficult  PHQ9 TOTAL SCORE: 6

## 2024-09-25 LAB
ALBUMIN SERPL BCG-MCNC: 4.2 G/DL (ref 3.5–5.2)
ALP SERPL-CCNC: 79 U/L (ref 40–150)
ALT SERPL W P-5'-P-CCNC: 9 U/L (ref 0–50)
ANION GAP SERPL CALCULATED.3IONS-SCNC: 8 MMOL/L (ref 7–15)
AST SERPL W P-5'-P-CCNC: 19 U/L (ref 0–45)
BILIRUB SERPL-MCNC: 2.4 MG/DL
BUN SERPL-MCNC: 12.9 MG/DL (ref 8–23)
CALCIUM SERPL-MCNC: 8.9 MG/DL (ref 8.8–10.4)
CHLORIDE SERPL-SCNC: 107 MMOL/L (ref 98–107)
CHOLEST SERPL-MCNC: 101 MG/DL
CREAT SERPL-MCNC: 0.59 MG/DL (ref 0.51–0.95)
EGFRCR SERPLBLD CKD-EPI 2021: >90 ML/MIN/1.73M2
FASTING STATUS PATIENT QL REPORTED: YES
FASTING STATUS PATIENT QL REPORTED: YES
GLUCOSE SERPL-MCNC: 88 MG/DL (ref 70–99)
HCO3 SERPL-SCNC: 26 MMOL/L (ref 22–29)
HDLC SERPL-MCNC: 48 MG/DL
LDLC SERPL CALC-MCNC: 37 MG/DL
NONHDLC SERPL-MCNC: 53 MG/DL
POTASSIUM SERPL-SCNC: 4.2 MMOL/L (ref 3.4–5.3)
PROT SERPL-MCNC: 6.7 G/DL (ref 6.4–8.3)
SODIUM SERPL-SCNC: 141 MMOL/L (ref 135–145)
T4 FREE SERPL-MCNC: 3.09 NG/DL (ref 0.9–1.7)
TRIGL SERPL-MCNC: 80 MG/DL
TSH SERPL DL<=0.005 MIU/L-ACNC: <0.01 UIU/ML (ref 0.3–4.2)

## 2024-10-12 LAB

## 2024-10-14 ENCOUNTER — TELEPHONE (OUTPATIENT)
Dept: INTERNAL MEDICINE | Facility: CLINIC | Age: 59
End: 2024-10-14
Payer: COMMERCIAL

## 2024-10-14 LAB
O+P STL MICRO: NEGATIVE
SPECIMEN TYPE: NORMAL

## 2024-10-14 NOTE — TELEPHONE ENCOUNTER
"---Message from BILLIE Monson CNP on 10/14/2024 12:09 PM CDT---  \"Neg stool\"    Attempt # 1  Called Phone # [phone]      Left message for patient to call clinic at: [phone].    "

## 2024-10-16 NOTE — TELEPHONE ENCOUNTER
"From [facility]:    \"Negative parasite check \"    Called and spoke with patient to relay lab results and provider message. Patient verbalizes understanding of instructions and indicates no further questions at this time. Patient will follow up with GI.     Thank you,  Mane Hemphill, Triage RN [facility]  10:49 AM 10/16/2024       "

## 2024-10-29 ENCOUNTER — PATIENT OUTREACH (OUTPATIENT)
Dept: CARE COORDINATION | Facility: CLINIC | Age: 59
End: 2024-10-29
Payer: COMMERCIAL

## 2024-10-29 ASSESSMENT — PATIENT HEALTH QUESTIONNAIRE - PHQ9: SUM OF ALL RESPONSES TO PHQ QUESTIONS 1-9: 15

## 2024-11-03 ASSESSMENT — PATIENT HEALTH QUESTIONNAIRE - PHQ9: SUM OF ALL RESPONSES TO PHQ QUESTIONS 1-9: 15

## 2024-11-26 ENCOUNTER — PATIENT OUTREACH (OUTPATIENT)
Dept: CARE COORDINATION | Facility: CLINIC | Age: 59
End: 2024-11-26
Payer: COMMERCIAL

## 2025-08-25 ENCOUNTER — PATIENT OUTREACH (OUTPATIENT)
Dept: CARE COORDINATION | Facility: CLINIC | Age: 60
End: 2025-08-25

## (undated) DEVICE — ENDO SNARE POLYPECTOMY OVAL 15MM LOOP SD-240U-15

## (undated) DEVICE — ENDO SNARE EXACTO COLD 9MM LOOP 2.4MMX230CM 00711115

## (undated) DEVICE — ESU GROUND PAD ADULT W/CORD E7507

## (undated) DEVICE — ENDO FORCEP ENDOJAW BIOPSY 2.8MMX230CM FB-220U

## (undated) DEVICE — KIT ENDO TURNOVER/PROCEDURE W/CLEAN A SCOPE LINERS 103888

## (undated) DEVICE — ENDO TRAP POLYP QUICK CATCH 710201

## (undated) RX ORDER — FENTANYL CITRATE 50 UG/ML
INJECTION, SOLUTION INTRAMUSCULAR; INTRAVENOUS
Status: DISPENSED
Start: 2018-01-15

## (undated) RX ORDER — ONDANSETRON 2 MG/ML
INJECTION INTRAMUSCULAR; INTRAVENOUS
Status: DISPENSED
Start: 2018-01-15

## (undated) RX ORDER — SIMETHICONE 40MG/0.6ML
SUSPENSION, DROPS(FINAL DOSAGE FORM)(ML) ORAL
Status: DISPENSED
Start: 2024-05-01

## (undated) RX ORDER — FENTANYL CITRATE 50 UG/ML
INJECTION, SOLUTION INTRAMUSCULAR; INTRAVENOUS
Status: DISPENSED
Start: 2024-05-01